# Patient Record
Sex: MALE | Race: WHITE | NOT HISPANIC OR LATINO | Employment: OTHER | URBAN - METROPOLITAN AREA
[De-identification: names, ages, dates, MRNs, and addresses within clinical notes are randomized per-mention and may not be internally consistent; named-entity substitution may affect disease eponyms.]

---

## 2017-07-13 ENCOUNTER — HOSPITAL ENCOUNTER (EMERGENCY)
Facility: HOSPITAL | Age: 62
Discharge: HOME | End: 2017-07-13
Attending: EMERGENCY MEDICINE
Payer: MEDICARE

## 2017-07-13 VITALS
SYSTOLIC BLOOD PRESSURE: 118 MMHG | BODY MASS INDEX: 25.11 KG/M2 | HEIGHT: 67 IN | RESPIRATION RATE: 20 BRPM | TEMPERATURE: 98 F | DIASTOLIC BLOOD PRESSURE: 75 MMHG | WEIGHT: 160 LBS | OXYGEN SATURATION: 96 % | HEART RATE: 72 BPM

## 2017-07-13 DIAGNOSIS — R73.9 HYPERGLYCEMIA: Primary | ICD-10-CM

## 2017-07-13 LAB
ALBUMIN SERPL BCP-MCNC: 3 G/DL
ALP SERPL-CCNC: 83 U/L
ALT SERPL W/O P-5'-P-CCNC: 108 U/L
ANION GAP SERPL CALC-SCNC: 7 MMOL/L
AST SERPL-CCNC: 102 U/L
B-OH-BUTYR BLD STRIP-SCNC: 0.3 MMOL/L
BASOPHILS # BLD AUTO: 0.02 K/UL
BASOPHILS NFR BLD: 0.5 %
BILIRUB SERPL-MCNC: 1 MG/DL
BUN SERPL-MCNC: 11 MG/DL
CALCIUM SERPL-MCNC: 8.4 MG/DL
CHLORIDE SERPL-SCNC: 109 MMOL/L
CO2 SERPL-SCNC: 21 MMOL/L
CREAT SERPL-MCNC: 0.9 MG/DL
DIFFERENTIAL METHOD: ABNORMAL
EOSINOPHIL # BLD AUTO: 0.1 K/UL
EOSINOPHIL NFR BLD: 1.9 %
ERYTHROCYTE [DISTWIDTH] IN BLOOD BY AUTOMATED COUNT: 13.2 %
EST. GFR  (AFRICAN AMERICAN): >60 ML/MIN/1.73 M^2
EST. GFR  (NON AFRICAN AMERICAN): >60 ML/MIN/1.73 M^2
GLUCOSE SERPL-MCNC: 286 MG/DL
HCT VFR BLD AUTO: 39.6 %
HGB BLD-MCNC: 13.9 G/DL
LYMPHOCYTES # BLD AUTO: 1.7 K/UL
LYMPHOCYTES NFR BLD: 39.8 %
MCH RBC QN AUTO: 32.7 PG
MCHC RBC AUTO-ENTMCNC: 35.1 %
MCV RBC AUTO: 93 FL
MONOCYTES # BLD AUTO: 0.3 K/UL
MONOCYTES NFR BLD: 6.3 %
NEUTROPHILS # BLD AUTO: 2.2 K/UL
NEUTROPHILS NFR BLD: 51.3 %
PLATELET # BLD AUTO: 78 K/UL
PMV BLD AUTO: 11.5 FL
POCT GLUCOSE: 296 MG/DL (ref 70–110)
POTASSIUM SERPL-SCNC: 4 MMOL/L
PROT SERPL-MCNC: 6.8 G/DL
RBC # BLD AUTO: 4.25 M/UL
SODIUM SERPL-SCNC: 137 MMOL/L
WBC # BLD AUTO: 4.3 K/UL

## 2017-07-13 PROCEDURE — 82962 GLUCOSE BLOOD TEST: CPT

## 2017-07-13 PROCEDURE — 80053 COMPREHEN METABOLIC PANEL: CPT

## 2017-07-13 PROCEDURE — 99284 EMERGENCY DEPT VISIT MOD MDM: CPT | Mod: ,,, | Performed by: PHYSICIAN ASSISTANT

## 2017-07-13 PROCEDURE — 82010 KETONE BODYS QUAN: CPT

## 2017-07-13 PROCEDURE — 25000003 PHARM REV CODE 250: Performed by: PHYSICIAN ASSISTANT

## 2017-07-13 PROCEDURE — 85025 COMPLETE CBC W/AUTO DIFF WBC: CPT

## 2017-07-13 PROCEDURE — 99283 EMERGENCY DEPT VISIT LOW MDM: CPT

## 2017-07-13 RX ORDER — NAPROXEN 500 MG/1
500 TABLET ORAL
Status: COMPLETED | OUTPATIENT
Start: 2017-07-13 | End: 2017-07-13

## 2017-07-13 RX ADMIN — NAPROXEN 500 MG: 500 TABLET ORAL at 06:07

## 2017-07-13 NOTE — ED NOTES
Patient here c/o elevated blood sugar as diagnosed at  yesterday, but also c/o generalized weakness for past 2 weeks with difficulty ambulating.  Pt in no acute distress, respirations even and unlabored, AA&Ox3

## 2017-07-13 NOTE — ED NOTES
Patient and family acting out and becoming hostile with FRANCISCA Adam.  I called for security, pt and family raised voices and became angry with me, then left before security got here.  Pt ambulated out in no acute distress, respirations even and unlabored, AA&Ox3

## 2017-07-13 NOTE — ED PROVIDER NOTES
"Encounter Date: 7/13/2017       History     Chief Complaint   Patient presents with    Hyperglycemia     Pt diagnosed with diabetes yesterday in ED.  Pt & family left there b/c "they were not treating him right".  States pt fell several times in the hospital.       Patient is a 61-year-old male with no significant past medical history who presents the ED with multiple complaints.  Patient states that yesterday, he had abnormal gait and was running into the walls.  He states that he went to [facility] ED yesterday for his symptoms and had imaging of his neck and head.  Patient is unsure of results, but states that his son knows.  He states that he "signed papers" and left because they told him that he could "check himself out" if he wanted to, so patient left.  When asked again why he presents to this ED, patient states that he keeps running into the walls and that he has high blood sugar. He endorses neck pain that is chronic and unchanged since his accident in the past.  He denies any headache, dizziness, chest pain, abdominal pain, urinary difficulties. He states that he takes xanex for his anxiety.    Daughter called ED, and I talked to Nadia ([phone]). She states that her father has a hx of substance abuse (xanex, suboxone, and "anything he can get his hands on"). He will often get pain pills off the street for his chronic neck pain. Patient denies any drug use other than xanex during my interview. Nadia states that his neighbor called the 911 on him yesterday evening and was sent to 's ED. He was reported to have blood glucose >1000 and given insulin. Patient was to be admitted but left AMA.          Review of patient's allergies indicates:  No Known Allergies  Past Medical History:   Diagnosis Date    Kidney stone     Urinary tract infection      Past Surgical History:   Procedure Laterality Date    KIDNEY STONE SURGERY      NECK SURGERY      secondary to previous fall     Family " History   Problem Relation Age of Onset    Hypertension Mother     Diabetes Mother     Stroke Mother     Prostate cancer Neg Hx     Kidney disease Neg Hx      Social History   Substance Use Topics    Smoking status: Current Every Day Smoker     Packs/day: 1.50     Years: 40.00    Smokeless tobacco: Never Used    Alcohol use Yes     Review of Systems   Constitutional: Negative for chills and fever.   HENT: Negative for ear pain and sore throat.    Eyes: Negative for visual disturbance.   Respiratory: Negative for shortness of breath.    Cardiovascular: Negative for chest pain.   Gastrointestinal: Negative for abdominal pain, nausea and vomiting.   Genitourinary: Negative for dysuria and hematuria.   Musculoskeletal: Positive for arthralgias, gait problem and neck pain. Negative for back pain and neck stiffness.   Skin: Negative for rash.   Neurological: Negative for dizziness, weakness and headaches.   Hematological: Does not bruise/bleed easily.       Physical Exam     Initial Vitals [07/13/17 1329]   BP Pulse Resp Temp SpO2   123/75 79 20 98 °F (36.7 °C) 99 %      MAP       91         Physical Exam    Vitals reviewed.  Constitutional: He appears well-developed and well-nourished. He is not diaphoretic. No distress.   HENT:   Head: Normocephalic and atraumatic.   Nose: Nose normal.   Mouth/Throat: Oropharynx is clear and moist.   Missing teeth   Eyes: Conjunctivae and EOM are normal. Pupils are equal, round, and reactive to light.   Neck: Normal range of motion.   Cardiovascular: Normal rate, regular rhythm and normal heart sounds. Exam reveals no friction rub.    No murmur heard.  Pulmonary/Chest: Breath sounds normal. No respiratory distress. He has no wheezes. He has no rales.   Abdominal: Soft. Bowel sounds are normal. He exhibits no distension. There is no tenderness.   Musculoskeletal: Normal range of motion.   Neurological: He is alert and oriented to person, place, and time. He has normal strength.  No sensory deficit.   Normal finger to nose and rapid alternating hand movements. Good finger . No arm drip.  FROM of 5/5 strength of krys upper and lower extremities.  Negative pronator drift and romberg's. He ambulates without difficulty.   Skin: Skin is warm and dry. No erythema.   Psychiatric: He has a normal mood and affect. Thought content normal.         ED Course   Procedures  Labs Reviewed   CBC W/ AUTO DIFFERENTIAL - Abnormal; Notable for the following:        Result Value    RBC 4.25 (*)     Hemoglobin 13.9 (*)     Hematocrit 39.6 (*)     MCH 32.7 (*)     Platelets 78 (*)     All other components within normal limits   COMPREHENSIVE METABOLIC PANEL - Abnormal; Notable for the following:     CO2 21 (*)     Glucose 286 (*)     Calcium 8.4 (*)     Albumin 3.0 (*)      (*)      (*)     Anion Gap 7 (*)     All other components within normal limits   POCT GLUCOSE - Abnormal; Notable for the following:     POCT Glucose 296 (*)     All other components within normal limits   BETA - HYDROXYBUTYRATE, SERUM             Medical Decision Making:   History:   Old Medical Records: I decided to obtain old medical records.  Clinical Tests:   Lab Tests: Ordered and Reviewed       APC / Resident Notes:   DDX includes but is not limited to hyperglycemia, DKA, substance abuse, electrolyte abnormalities, hypoglycemia.  Will order labs and imaging and reassess.    Fingerstick glucose of 296.  CBC with leukocytosis or anemia.  Thrombocytopenia at 78.  CMP with no electrolyte abnormalities.  Creatinine stable at 0.9. Transaminitis of  and .  Glucose of 286. Anion gap of 7. B-hydroxy of 0.3.    Patient ambulated around the ED with myself and nurse without assistance.  He did not have any difficulty.  No neurological deficits appreciated on my exam.  I have considered but do not suspect any intracerebral hemorrhage or lesion.  Labwork do not suggest DKA today.     Patient's son and daughter-in-law  "arrived to ED to  father. I updated both on his ED course. I explained to them that from what it sounds like patient was possibly treated for DKA at  ED if he was given insulin, but he left AMA before being admitted there. I reassured them that today's lab work does not suggest DKA, and that they most likely treated him properly and that the appropriate next step would be close f/u with PCP in 1 day to manage hyperglycemia. Son and patient started raising their voices at me, and patient was in my face pointing his finger, stating "is it going to be your fault if I die tomorrow". Nurse called security to aid, but patient and son was calmed down with the aid of myself and daughter and law. Again, I reassured patient and family that patient is in stable condition, and patient ambulated out of team 3 without assistance or difficulty before security arrived. I gave thorough instructions to patient and family, especially daughter in law, about importance of follow up and gave strict ED return precaution which they voiced their understanding. I gave them number to our internal medicine clinic for a second opinion as requested. I also suggest that they call their insurance to see who is in network. Diabetic diet resources given with discharge instructions. All questions answered. I have reviewed patient's chart and labs and discuss this case with my supervising MD.              ED Course     Clinical Impression:   The encounter diagnosis was Hyperglycemia.    Disposition:   Disposition: Discharged  Condition: Stable                        Susie Adam PA-C  07/14/17 1915    "

## 2017-07-13 NOTE — DISCHARGE INSTRUCTIONS
Follow up closely with your primary care physician in 1 day for wellness visit and routine blood work to check for diabetes, high cholesterol, high blood pressure, etc.  Return to the emergency department for new or worsening symptoms.    No future appointments.    Our goal in the emergency department is to always give you outstanding care and exceptional service. You may receive a survey by mail or e-mail in the next week regarding your experience in our ED. We would greatly appreciate your completing and returning the survey. Your feedback provides us with a way to recognize our staff who give very good care and it helps us learn how to improve when your experience was below our aspiration of excellence.

## 2017-10-11 ENCOUNTER — OFFICE VISIT (OUTPATIENT)
Dept: NEUROLOGY | Facility: CLINIC | Age: 62
End: 2017-10-11
Payer: MEDICARE

## 2017-10-11 VITALS
HEART RATE: 96 BPM | SYSTOLIC BLOOD PRESSURE: 100 MMHG | BODY MASS INDEX: 22.98 KG/M2 | HEIGHT: 67 IN | WEIGHT: 146.38 LBS | DIASTOLIC BLOOD PRESSURE: 70 MMHG

## 2017-10-11 DIAGNOSIS — M54.2 NECK PAIN: Primary | ICD-10-CM

## 2017-10-11 DIAGNOSIS — F11.20 OPIATE DEPENDENCE, CONTINUOUS: ICD-10-CM

## 2017-10-11 PROCEDURE — 99213 OFFICE O/P EST LOW 20 MIN: CPT | Mod: PBBFAC | Performed by: PSYCHIATRY & NEUROLOGY

## 2017-10-11 PROCEDURE — 99999 PR PBB SHADOW E&M-EST. PATIENT-LVL III: CPT | Mod: PBBFAC,GC,, | Performed by: PSYCHIATRY & NEUROLOGY

## 2017-10-11 PROCEDURE — 99205 OFFICE O/P NEW HI 60 MIN: CPT | Mod: S$PBB,GC,, | Performed by: PSYCHIATRY & NEUROLOGY

## 2017-10-11 RX ORDER — BUPRENORPHINE HYDROCHLORIDE, NALOXONE HYDROCHLORIDE 8; 2 MG/1; MG/1
FILM, SOLUBLE BUCCAL; SUBLINGUAL
COMMUNITY
End: 2018-12-26

## 2017-10-11 RX ORDER — IBUPROFEN 200 MG
200 TABLET ORAL EVERY 6 HOURS PRN
COMMUNITY
End: 2018-12-26

## 2017-10-11 NOTE — PROGRESS NOTES
Patient Name: Josiah Richter  MRN: [id number]    CC: Neck     HPI: Josiah Richter is a 62 y.o. male presented to my clinic for the evaluation of Neck pain. He reported that neck pain started in 2000. At that time 10/10, gradual in onset, throbbing in nature, radiate to left arm,pain get worse with positional change. Denies any trauma of fall at that time. He was seen by pain doctor in [facility] (Dr Ny Duran) and per patient he burned his nerves in the neck and received multiple injections. Last treatment was couple of years ago. This year in January he had a fall at [facility] while purchasing her medication pills. We went to the ED and worked up with no acute injury. He reported a fall in 1998 and had neck surgery and a plate was implanted. Last surgery was in 2002. He was seen by Neurosurgeon (Abebe Castro) at [facility]. Multiple MRI spine was done with significant degenerative changes. He tried Gabapentin, Hydrocodone, Oxycodone, Percocet, Ketoralac, meloxacam, Motrin, Xanax, and many other over the counter medication. I do not see any referral for neurology. Per patient some physician at [facility] sent him here for pain management. He is very poor historian. Most of my questions he is saying he don't remember. Today he is complaining of neck pain 10/10, mostly on the right side, sharp in nature, non radiation. Currently he is on opiates. He said nobody is writing percocet and other opiates for me and he is here to get prescription. He said he can only get these medication from ED.Denies HA, dizziness, difficulty in swallowing, seizure or LOC.        Note from Family practitioner in 2015:   The patient has had previous osteoarthritis of lumbar spine, a previous herniated disc and previous spinal surgery - on his neck following a fall. Symptoms have been present for several years and are unchanged.  Onset was related to / precipitated by a remote injury and a fall. The pain is located in the lumbar and neck and does not  "radiate. The pain is described as sharp and stabbing and occurs all day. He rates his pain as moderate. Symptoms are exacerbated by exercise, straining, twisting and walking for more than a few minutes. Symptoms are improved by narcotic pain medications. He has also tried heat, ice and NSAIDs which provided no symptom relief. He has no other symptoms associated with the back pain. The patient has no "red flag" history indicative of complicated back pain.     ROS:   Constitutional: Negative for malaise/fatigue. Negative for weight loss.   HENT: Negative for hearing loss.   Eyes: Negative for blurred vision and double vision.   Respiratory: Negative for shortness of breath and stridor.   Cardiovascular: Negative for chest pain and palpitations.   Gastrointestinal: Negative for nausea, vomiting and constipation.   Genitourinary: Negative for frequency. Negative for urgency.   Musculoskeletal: Negative for joint pain. Negative for myalgias and falls.   Skin: Negative for rash.   Neurological: Negative for dizziness and tremors. Negative for focal weakness and seizures.   Endo/Heme/Allergies: Does not bruise/bleed easily.   Psychiatric/Behavioral: Negative for depression and hallucinations. The patient is not nervous/anxious.    Past Medical History  Past Medical History:   Diagnosis Date    Kidney stone     Urinary tract infection        Medications    Current Outpatient Prescriptions:     alprazolam (XANAX) 0.5 MG tablet, Take 0.5 mg by mouth 3 (three) times daily., Disp: , Rfl:     buprenorphine-naloxone (SUBOXONE) 8-2 mg Film, Place under the tongue., Disp: , Rfl:     ciprofloxacin (CIPRO) 100 MG tablet, Take 100 mg by mouth 2 (two) times daily., Disp: , Rfl:     empagliflozin-linagliptin (GLYXAMBI) 25-5 mg Tab, Take by mouth., Disp: , Rfl:     fluoxetine (PROZAC) 20 MG capsule, Take 20 mg by mouth once daily., Disp: , Rfl:     ibuprofen (ADVIL,MOTRIN) 200 MG tablet, Take 200 mg by mouth every 6 (six) hours " "as needed for Pain., Disp: , Rfl:     oxycodone (OXYCONTIN) 80 MG Tb12, Take 10 mg by mouth every 12 (twelve) hours., Disp: , Rfl:     oxycodone (ROXICODONE) 30 MG Tab, Take 5 mg by mouth every 6 (six) hours as needed., Disp: , Rfl:     Allergies  Review of patient's allergies indicates:  No Known Allergies    Social History  Social History     Social History    Marital status:      Spouse name: N/A    Number of children: N/A    Years of education: N/A     Occupational History    Not on file.     Social History Main Topics    Smoking status: Current Every Day Smoker     Packs/day: 1.50     Years: 40.00    Smokeless tobacco: Never Used    Alcohol use Yes    Drug use:      Types: Oxycodone    Sexual activity: Not Currently     Other Topics Concern    Not on file     Social History Narrative    No narrative on file       Family History  Family History   Problem Relation Age of Onset    Hypertension Mother     Diabetes Mother     Stroke Mother     Prostate cancer Neg Hx     Kidney disease Neg Hx        Physical Exam  /70   Pulse 96   Ht 5' 7" (1.702 m)   Wt 66.4 kg (146 lb 6.2 oz)   BMI 22.93 kg/m²     General appearance: Well-developed, well-groomed.     Neurologic Exam: The patient is awake, alert and oriented. Language is fluent. Recent and remote memory are normal. Attention span and concentration are normal. Fund of knowledge is appropriate.     Cranial nerves: pupils are round and reactive to light and accommodation. Visual fields are full to confrontation. Fundoscopic exam reveals sharp discs bilaterally, with good venous pulsations. Ocular motility is full in all cardinal positions of gaze. Facial sensation is normal to pinprick and light touch. Corneal reflexes are present bilaterally. Facial activation is symmetric. Hearing is normal bilaterally. Palate elevates symmetrically and gag reflex is intact bilaterally. Shoulder elevation is symmetric and full strength bilaterally. " Tongue is midline and neck rotation strength is normal bilaterally. Neck range of motion is normal.     Motor examination of all extremities demonstrates normal bulk and tone in all four limbs. There are no atrophy or fasciculations. Strength is 5/5 in the upper and lower extremities bilaterally without pronator drift.     Sensory examination is normal to pinprick, vibration and proprioception in the upper and lower extremities bilaterally. Romberg is negative.    Deep tendon reflexes are 3+ and symmetric in the upper and lower extremities bilaterally. Toes are mute bilaterally.     Gait: Normal heel, toe, tandem, and casual gait.    Coordination: Finger to nose and heel to shin testing is normal in both upper and lower extremities. Rapid alternating movements are normal in both upper and lower extremities.     General exam  Cardiovascular: regular rate and rhythm with no murmurs, rubs or gallops. There are no carotid or vertebral artery bruits. Pulses in both upper and lower extremities are symmetric. There is no peripheral edema.   Head and neck: no cervical lymphadenopathy    Lab and Test Results    WBC   Date Value Ref Range Status   07/13/2017 4.30 3.90 - 12.70 K/uL Final   03/13/2015 7.69 3.90 - 12.70 K/uL Final   07/18/2004 13.10 (H) 4.8 - 10.8 K/uL Final     Hemoglobin   Date Value Ref Range Status   07/13/2017 13.9 (L) 14.0 - 18.0 g/dL Final   03/13/2015 15.5 14.0 - 18.0 g/dL Final   07/18/2004 15.1 14.0 - 18.0 gm/dl Final     Hematocrit   Date Value Ref Range Status   07/13/2017 39.6 (L) 40.0 - 54.0 % Final   03/13/2015 45.8 40.0 - 54.0 % Final   07/18/2004 44.7 40.0 - 54.0 % Final     Platelets   Date Value Ref Range Status   07/13/2017 78 (L) 150 - 350 K/uL Final   03/13/2015 136 (L) 150 - 350 K/uL Final   07/18/2004 308 150 - 350 K/uL Final     Glucose   Date Value Ref Range Status   07/13/2017 286 (H) 70 - 110 mg/dL Final   03/13/2015 99 70 - 110 mg/dL Final   07/18/2004 95 70 - 110 mg/dl Final      Sodium   Date Value Ref Range Status   07/13/2017 137 136 - 145 mmol/L Final   03/13/2015 137 136 - 145 mmol/L Final   07/18/2004 136 136 - 145 mMol/l Final     Potassium   Date Value Ref Range Status   07/13/2017 4.0 3.5 - 5.1 mmol/L Final   03/13/2015 4.1 3.5 - 5.1 mmol/L Final   07/18/2004 5.3 3.3 - 5.3 mMol/l Final     Chloride   Date Value Ref Range Status   07/13/2017 109 95 - 110 mmol/L Final   03/13/2015 107 95 - 110 mmol/L Final   07/18/2004 101 95 - 110 mMol/l Final     CO2   Date Value Ref Range Status   07/13/2017 21 (L) 23 - 29 mmol/L Final   03/13/2015 23 23 - 29 mmol/L Final   07/18/2004 19 (L) 23.0 - 29.0 mEq/L Final     BUN, Bld   Date Value Ref Range Status   07/13/2017 11 6 - 20 mg/dL Final   03/13/2015 16 6 - 20 mg/dL Final   07/18/2004 13 5 - 23 mg/dl Final     Creatinine   Date Value Ref Range Status   07/13/2017 0.9 0.5 - 1.4 mg/dL Final   03/13/2015 1.0 0.5 - 1.4 mg/dL Final   07/18/2004 0.9 0.5 - 1.4 mg/dl Final     Calcium   Date Value Ref Range Status   07/13/2017 8.4 (L) 8.7 - 10.5 mg/dL Final   03/13/2015 9.1 8.7 - 10.5 mg/dL Final   07/18/2004 9.5 8.7 - 10.5 mg/dl Final     Magnesium   Date Value Ref Range Status   01/27/2004 2.8 (H) 1.6 - 2.4 mg/dl Final     Alkaline Phosphatase   Date Value Ref Range Status   07/13/2017 83 55 - 135 U/L Final   03/13/2015 73 55 - 135 U/L Final   03/10/2004 82 55 - 135 U/L Final     ALT   Date Value Ref Range Status   07/13/2017 108 (H) 10 - 44 U/L Final   03/13/2015 77 (H) 10 - 44 U/L Final   03/10/2004 29 0 - 40 U/L Final     AST   Date Value Ref Range Status   07/13/2017 102 (H) 10 - 40 U/L Final   03/13/2015 72 (H) 10 - 40 U/L Final   03/10/2004 28 0 - 37 U/L Final       Images:   No recent images     Assessment and Plan    Josiah Richter is a 62 y.o. male presented to my clinic for the evaluation of neck pain. He is here for prescription refill (opiates).     -- Refused Gabapentin and other medication  -- Asking for opioids and pain patches    -- Me and Dr Alexander have explained in detail that we don't write opiates for neuropathic pain.   -- Referred to Pain medication.    -- Case discussed with Dr Alexander  -- Follow up MAURICIO Pritchard MD  Neurology Resident   [facility]  [address] [place] [address]  Pager: [phone]      I have personally taken a history and examined the patient and agree with the resident's note as stated above.    Ernesto Alexander MD  [facility]

## 2017-11-27 RX ORDER — INSULIN GLARGINE 100 [IU]/ML
INJECTION, SOLUTION SUBCUTANEOUS
Qty: 15 ML | Refills: 2 | OUTPATIENT
Start: 2017-11-27

## 2018-03-26 ENCOUNTER — HOSPITAL ENCOUNTER (EMERGENCY)
Facility: OTHER | Age: 63
Discharge: HOME | End: 2018-03-26
Attending: EMERGENCY MEDICINE
Payer: MEDICARE

## 2018-03-26 VITALS
BODY MASS INDEX: 23.14 KG/M2 | TEMPERATURE: 99 F | SYSTOLIC BLOOD PRESSURE: 115 MMHG | HEART RATE: 81 BPM | HEIGHT: 66 IN | WEIGHT: 144 LBS | DIASTOLIC BLOOD PRESSURE: 73 MMHG | OXYGEN SATURATION: 96 % | RESPIRATION RATE: 18 BRPM

## 2018-03-26 DIAGNOSIS — Z76.0 MEDICATION REFILL: Primary | ICD-10-CM

## 2018-03-26 PROCEDURE — 99282 EMERGENCY DEPT VISIT SF MDM: CPT

## 2018-03-26 RX ORDER — METFORMIN HYDROCHLORIDE 500 MG/1
500 TABLET ORAL 2 TIMES DAILY WITH MEALS
Qty: 28 TABLET | Refills: 0 | Status: ON HOLD | OUTPATIENT
Start: 2018-03-26 | End: 2019-02-28

## 2018-03-26 NOTE — ED PROVIDER NOTES
Encounter Date: 3/26/2018       History     Chief Complaint   Patient presents with    Medication Refill     Pt states he took his last pill of metformin this am and needs a refill     Patient is a 62-year-old male with a past medical history of diabetes, presenting to the emergency department with complaints of a medication refill.  The patient states that he took his last pill of metformin earlier today.  He states that he does not have any evidence concerning his diabetes is going to become out of control.  He states he does not see her PCP for this nor does he follow regularly.  He states he typically receives his refills from urgent care the emergency department.  In addition, the patient reports he is most concerned because he thinks that he accidentally threw away his bottle of Xanax.  He states that he typically receives approximate 45 pills per month.  He states he is not due to see his rash list until next month and is concerned he threw away his bottle.  He states he needs a new refill now.      The history is provided by the patient.     Review of patient's allergies indicates:  No Known Allergies  Past Medical History:   Diagnosis Date    Anxiety     Diabetes mellitus 2017    Kidney stone     Urinary tract infection      Past Surgical History:   Procedure Laterality Date    KIDNEY STONE SURGERY      NECK SURGERY      secondary to previous fall     Family History   Problem Relation Age of Onset    Hypertension Mother     Diabetes Mother     Stroke Mother     Prostate cancer Neg Hx     Kidney disease Neg Hx      Social History   Substance Use Topics    Smoking status: Current Every Day Smoker     Packs/day: 1.50     Years: 40.00    Smokeless tobacco: Never Used    Alcohol use No     Review of Systems   Constitutional: Negative for activity change, chills, fatigue and fever.   HENT: Negative for congestion, rhinorrhea and sore throat.    Eyes: Negative for photophobia and visual disturbance.    Respiratory: Negative for cough and shortness of breath.    Cardiovascular: Negative for chest pain.   Gastrointestinal: Negative for abdominal pain, diarrhea, nausea and vomiting.   Genitourinary: Negative for dysuria, hematuria and urgency.   Musculoskeletal: Negative for back pain, myalgias and neck pain.   Skin: Negative for color change and wound.   Neurological: Negative for weakness and headaches.   Psychiatric/Behavioral: Negative for agitation and confusion.       Physical Exam     Initial Vitals [03/26/18 1301]   BP Pulse Resp Temp SpO2   126/69 90 18 98.5 °F (36.9 °C) 99 %      MAP       88         Physical Exam    Nursing note and vitals reviewed.  Constitutional: Vital signs are normal. He appears well-developed and well-nourished. He is not diaphoretic.  Non-toxic appearance. He does not have a sickly appearance. He does not appear ill. No distress.   Thin,  male who appears older than stated age.  He is in no acute distress.  He speaking clear and full sentences.   HENT:   Head: Normocephalic and atraumatic.   Right Ear: External ear normal.   Left Ear: External ear normal.   Nose: Nose normal.   Mouth/Throat: Oropharynx is clear and moist.   Eyes: Conjunctivae and EOM are normal.   Neck: Normal range of motion. Neck supple.   Cardiovascular: Normal rate, regular rhythm and normal heart sounds.   Pulmonary/Chest: Breath sounds normal. No respiratory distress. He has no wheezes.   Musculoskeletal: Normal range of motion.   Neurological: He is alert and oriented to person, place, and time. GCS eye subscore is 4. GCS verbal subscore is 5. GCS motor subscore is 6.   Skin: Skin is warm.   Psychiatric: He has a normal mood and affect. His behavior is normal. Judgment and thought content normal.         ED Course   Procedures  Labs Reviewed - No data to display          Medical Decision Making:   Initial Assessment:   Urgent evaluation of a 62-year-old male presenting to the emergency department  for medication refill.  Patient is afebrile, nontoxic appearing, hemodynamically stable.  Physical exam reveals no significant findings.  Patient is requesting refills for metformin in addition to Xanax.  ED Management:  I explained to the patient that I would be happy to provide him with a 2 week prescription of metformin in addition to resources to obtain a PCP for further evaluation and management as well as control of his diabetes.  I did also place the patient that I would be unwilling to provide a prescription for Xanax as a states a controlled substance.  The patient became upset by this, but I explained to him that he would need to follow-up with his specialist who gives him all of these pills.  Given a prescription for metformin, discharged in stable condition. The patient was instructed to follow up with a primary care provider in 2 days or to return to the emergency department for worsening symptoms. The treatment plan was discussed with the patient who demonstrated understanding and comfort with plan. The patient's history, physical exam, and plan of care was discussed with and agreed upon with my supervising physician.    Other:   I have discussed this case with another health care provider.       <> Summary of the Discussion: Dr. Hameed  This note was created using Dragon Medical Dictation. There may be typographical errors secondary to dictation.                       Clinical Impression:     1. Medication refill         Disposition:   Disposition: Discharged  Condition: Stable                        Nadine Vasquez PA-C  03/26/18 8634

## 2018-03-26 NOTE — ED NOTES
"Pt states he was cleaning out his medication drawer and accidentally threw away his anxiety medication and took his last DM medication "Metformin" today. Pt requests medication refill. Pt states Dr. Nahid Gardner is PCP with next appointment "on the 21st".   "

## 2018-04-15 ENCOUNTER — HOSPITAL ENCOUNTER (EMERGENCY)
Facility: OTHER | Age: 63
Discharge: LEFT BEFORE DISCHARGE | End: 2018-04-15
Attending: EMERGENCY MEDICINE
Payer: MEDICARE

## 2018-04-15 VITALS
HEART RATE: 78 BPM | WEIGHT: 144 LBS | TEMPERATURE: 98 F | OXYGEN SATURATION: 97 % | RESPIRATION RATE: 15 BRPM | BODY MASS INDEX: 22.6 KG/M2 | HEIGHT: 67 IN | SYSTOLIC BLOOD PRESSURE: 110 MMHG | DIASTOLIC BLOOD PRESSURE: 68 MMHG

## 2018-04-15 DIAGNOSIS — R42 DIZZINESS: ICD-10-CM

## 2018-04-15 LAB
ALBUMIN SERPL BCP-MCNC: 3.3 G/DL
ALP SERPL-CCNC: 67 U/L
ALT SERPL W/O P-5'-P-CCNC: 46 U/L
ANION GAP SERPL CALC-SCNC: 10 MMOL/L
AST SERPL-CCNC: 45 U/L
BASOPHILS # BLD AUTO: 0.02 K/UL
BASOPHILS NFR BLD: 0.5 %
BILIRUB SERPL-MCNC: 0.4 MG/DL
BUN SERPL-MCNC: 14 MG/DL
CALCIUM SERPL-MCNC: 9.4 MG/DL
CHLORIDE SERPL-SCNC: 110 MMOL/L
CO2 SERPL-SCNC: 22 MMOL/L
CREAT SERPL-MCNC: 0.8 MG/DL
DIFFERENTIAL METHOD: ABNORMAL
EOSINOPHIL # BLD AUTO: 0.1 K/UL
EOSINOPHIL NFR BLD: 2.1 %
ERYTHROCYTE [DISTWIDTH] IN BLOOD BY AUTOMATED COUNT: 14.4 %
EST. GFR  (AFRICAN AMERICAN): >60 ML/MIN/1.73 M^2
EST. GFR  (NON AFRICAN AMERICAN): >60 ML/MIN/1.73 M^2
GLUCOSE SERPL-MCNC: 119 MG/DL
HCT VFR BLD AUTO: 38.3 %
HGB BLD-MCNC: 12.4 G/DL
LYMPHOCYTES # BLD AUTO: 1.7 K/UL
LYMPHOCYTES NFR BLD: 41.5 %
MCH RBC QN AUTO: 32.1 PG
MCHC RBC AUTO-ENTMCNC: 32.4 G/DL
MCV RBC AUTO: 99 FL
MONOCYTES # BLD AUTO: 0.3 K/UL
MONOCYTES NFR BLD: 7.9 %
NEUTROPHILS # BLD AUTO: 2 K/UL
NEUTROPHILS NFR BLD: 47.8 %
PLATELET # BLD AUTO: 127 K/UL
PMV BLD AUTO: 10.5 FL
POCT GLUCOSE: 135 MG/DL (ref 70–110)
POTASSIUM SERPL-SCNC: 4.4 MMOL/L
PROT SERPL-MCNC: 7.5 G/DL
RBC # BLD AUTO: 3.86 M/UL
SODIUM SERPL-SCNC: 142 MMOL/L
WBC # BLD AUTO: 4.19 K/UL

## 2018-04-15 PROCEDURE — 99284 EMERGENCY DEPT VISIT MOD MDM: CPT | Mod: 25

## 2018-04-15 PROCEDURE — 85025 COMPLETE CBC W/AUTO DIFF WBC: CPT

## 2018-04-15 PROCEDURE — 80053 COMPREHEN METABOLIC PANEL: CPT

## 2018-04-15 PROCEDURE — 93005 ELECTROCARDIOGRAM TRACING: CPT

## 2018-04-15 PROCEDURE — 93010 ELECTROCARDIOGRAM REPORT: CPT | Mod: ,,, | Performed by: INTERNAL MEDICINE

## 2018-04-15 PROCEDURE — 82962 GLUCOSE BLOOD TEST: CPT

## 2018-04-15 NOTE — ED NOTES
"Attempted to complete hourly rounding. Pt not in room. Pt believed to have eloped. Pt witnessed leaving by ED staff, stating "I am ready to go".   "

## 2018-04-15 NOTE — ED PROVIDER NOTES
Encounter Date: 4/15/2018       History     Chief Complaint   Patient presents with    Gait Problem     Pt reports recent medicatin change. Pt reports that he was taken off  insulin & kept on metformin BID. Pt reports unstable gait, staggering & needing to hold on to the wall to assist ambulation or he feels he may fall. Pt reports that his glucometer broke & has not checked his blood sugar in 2 months. Pt reports severe weight loss in the pass 2 months.     Patient is a 62-year-old male with history of anxiety and diabetes who presents to the emergency department with unsteady gait.  Patient states he was diagnosed with diabetes 6 months ago.  He states he was initially started on metformin and insulin.  He states after 2 weeks of taking the insulin, his PCP instructed him to discontinue the insulin and only take the metformin.  He states he has been compliant with the metformin over the last 6 months.  He states over the last 8 weeks he has not been feeling himself.  He reports large amount of weight loss.  He reports decreased appetite.  He was attributing all of his symptoms to the metformin.  He states that 4 weeks ago he began to have an unsteady gait.  He states he does not feel dizzy, but he feels as if he is going to fall while walking.  He states he has to hold onto something in order to have steady gait.  He denies any changes in speech.  He denies facial asymmetry.  He denies extremity weakness.  He denies tinnitus.  He denies hearing loss.  He denies nausea or vomiting.  He reports normal bowel movements.  He denies recent illness, fevers, or chills.      The history is provided by the patient.     Review of patient's allergies indicates:  No Known Allergies  Past Medical History:   Diagnosis Date    Anxiety     Diabetes mellitus 2017    Kidney stone     Urinary tract infection      Past Surgical History:   Procedure Laterality Date    KIDNEY STONE SURGERY      NECK SURGERY      secondary to  previous fall     Family History   Problem Relation Age of Onset    Hypertension Mother     Diabetes Mother     Stroke Mother     Prostate cancer Neg Hx     Kidney disease Neg Hx      Social History   Substance Use Topics    Smoking status: Current Every Day Smoker     Packs/day: 1.50     Years: 40.00    Smokeless tobacco: Never Used    Alcohol use No     Review of Systems   Constitutional: Positive for appetite change. Negative for activity change, chills, fatigue and fever.   HENT: Negative for congestion, ear discharge, ear pain, postnasal drip, rhinorrhea, sore throat and trouble swallowing.    Eyes: Negative for photophobia and visual disturbance.   Respiratory: Negative for cough and shortness of breath.    Cardiovascular: Negative for chest pain.   Gastrointestinal: Negative for abdominal pain, blood in stool, constipation, diarrhea, nausea and vomiting.   Genitourinary: Negative for dysuria, flank pain and hematuria.   Musculoskeletal: Negative for back pain, neck pain and neck stiffness.   Skin: Negative for rash and wound.   Neurological: Negative for syncope, speech difficulty, weakness, light-headedness and numbness.        Unsteady gait       Physical Exam     Initial Vitals [04/15/18 1212]   BP Pulse Resp Temp SpO2   134/69 71 18 97.5 °F (36.4 °C) 96 %      MAP       90.67         Physical Exam    Nursing note and vitals reviewed.  Constitutional: He appears well-developed and well-nourished. He is not diaphoretic.  Non-toxic appearance. No distress.   HENT:   Head: Normocephalic.   Right Ear: Hearing and external ear normal.   Left Ear: Hearing and external ear normal.   Nose: Nose normal.   Mouth/Throat: Uvula is midline, oropharynx is clear and moist and mucous membranes are normal. No oropharyngeal exudate.   Eyes: Conjunctivae and EOM are normal. Pupils are equal, round, and reactive to light.   Neck: Normal range of motion and full passive range of motion without pain. Neck supple. No  spinous process tenderness and no muscular tenderness present. Normal range of motion present. No neck rigidity.   Cardiovascular: Normal rate and regular rhythm.   No lower extremity edema   Pulmonary/Chest: Breath sounds normal.   Abdominal: Soft. Bowel sounds are normal. There is no tenderness. There is no rebound, no CVA tenderness, no tenderness at McBurney's point and negative Pierre's sign.   Lymphadenopathy:     He has no cervical adenopathy.   Neurological: He is alert and oriented to person, place, and time. He has normal strength. No cranial nerve deficit or sensory deficit. Coordination and gait normal.   Steady gait   Skin: Skin is warm and dry. Capillary refill takes less than 2 seconds.   Psychiatric: He has a normal mood and affect.         ED Course   Procedures  Labs Reviewed   CBC W/ AUTO DIFFERENTIAL - Abnormal; Notable for the following:        Result Value    RBC 3.86 (*)     Hemoglobin 12.4 (*)     Hematocrit 38.3 (*)     MCV 99 (*)     MCH 32.1 (*)     Platelets 127 (*)     All other components within normal limits   COMPREHENSIVE METABOLIC PANEL - Abnormal; Notable for the following:     CO2 22 (*)     Glucose 119 (*)     Albumin 3.3 (*)     AST 45 (*)     ALT 46 (*)     All other components within normal limits   POCT GLUCOSE - Abnormal; Notable for the following:     POCT Glucose 135 (*)     All other components within normal limits   POCT GLUCOSE MONITORING CONTINUOUS     EKG Readings: (Independently Interpreted)   Initial Reading: No STEMI. Rhythm: Normal Sinus Rhythm. Heart Rate: 86.       X-Rays:   Independently Interpreted Readings:   Head CT: No hemorrhage.  No acute stroke. No mass effect or large masses.     Medical Decision Making:   Initial Assessment:   Urgent evaluation of a 62-year-old male with history of diabetes who presents to the emergency department with unsteady gait.  Patient is afebrile, nontoxic appearing, and hemodynamically stable.  Moist mucous membranes.  Benign  abdominal exam.  Normal neuro exam.  Steady gait appreciated on exam.  No abnormal coordination.  Patient has no other symptoms to suggest vertigo or in her ear etiology.  Head CT will be obtained.  EKG will be obtained.  Basic labs will be obtained.  Clinical Tests:   Lab Tests: Ordered and Reviewed  Radiological Study: Ordered and Reviewed  ED Management:  Before results were able to be discussed with patient and reevaluation peformed, patient eloped.  Other:   I have discussed this case with another health care provider.       <> Summary of the Discussion: Dr. Alvarado                      Clinical Impression:   The encounter diagnosis was Dizziness.                           Summer Huff PA-C  04/15/18 2039

## 2018-04-15 NOTE — ED TRIAGE NOTES
Pt presents to the ED c/o problems with gait x 3 months. Pt states that he feels off balance while walking. Pt states that he has to hold on to the wall when ambulating.Pt states that 2 months ago he weighed 197lbs and since he started Metformin he's down to 141 LBS.  Pt denies dizziness, syncope, fever and sob.

## 2018-04-15 NOTE — ED NOTES
Pt found attempting to leave ED. Pt instructed to wait in room until provider can discuss results with him. IV removed in case pt elopes. Will continue to monitor.

## 2018-12-26 ENCOUNTER — OFFICE VISIT (OUTPATIENT)
Dept: URGENT CARE | Facility: CLINIC | Age: 63
End: 2018-12-26
Payer: MEDICARE

## 2018-12-26 VITALS
DIASTOLIC BLOOD PRESSURE: 72 MMHG | HEART RATE: 83 BPM | BODY MASS INDEX: 22.6 KG/M2 | SYSTOLIC BLOOD PRESSURE: 117 MMHG | TEMPERATURE: 98 F | RESPIRATION RATE: 16 BRPM | OXYGEN SATURATION: 98 % | WEIGHT: 144 LBS | HEIGHT: 67 IN

## 2018-12-26 DIAGNOSIS — K08.9 CHRONIC DENTAL PAIN: ICD-10-CM

## 2018-12-26 DIAGNOSIS — G89.29 CHRONIC DENTAL PAIN: ICD-10-CM

## 2018-12-26 DIAGNOSIS — R05.9 COUGH: Primary | ICD-10-CM

## 2018-12-26 PROCEDURE — 99214 OFFICE O/P EST MOD 30 MIN: CPT | Mod: S$GLB,,, | Performed by: EMERGENCY MEDICINE

## 2018-12-26 RX ORDER — ONDANSETRON 4 MG/1
TABLET, ORALLY DISINTEGRATING ORAL
Status: ON HOLD | COMMUNITY
Start: 2018-11-28 | End: 2019-02-28

## 2018-12-26 RX ORDER — ALPRAZOLAM 2 MG/1
TABLET ORAL
Refills: 0 | COMMUNITY
Start: 2018-12-14 | End: 2019-02-02

## 2018-12-26 RX ORDER — ALPRAZOLAM 2 MG/1
1 TABLET ORAL 3 TIMES DAILY PRN
COMMUNITY
Start: 2018-05-05 | End: 2019-02-02

## 2018-12-26 RX ORDER — IBUPROFEN 800 MG/1
800 TABLET ORAL EVERY 8 HOURS PRN
Qty: 60 TABLET | Refills: 2 | Status: SHIPPED | OUTPATIENT
Start: 2018-12-26 | End: 2019-01-31

## 2018-12-26 RX ORDER — CEPHALEXIN 500 MG/1
CAPSULE ORAL
COMMUNITY
Start: 2018-10-31 | End: 2019-02-07

## 2018-12-26 RX ORDER — AMOXICILLIN AND CLAVULANATE POTASSIUM 875; 125 MG/1; MG/1
TABLET, FILM COATED ORAL
COMMUNITY
Start: 2018-09-27 | End: 2019-02-07

## 2018-12-26 RX ORDER — OXYCODONE AND ACETAMINOPHEN 10; 325 MG/1; MG/1
TABLET ORAL
Status: ON HOLD | COMMUNITY
Start: 2018-11-28 | End: 2019-02-28

## 2018-12-26 RX ORDER — PROMETHAZINE HYDROCHLORIDE AND CODEINE PHOSPHATE 6.25; 1 MG/5ML; MG/5ML
5 SOLUTION ORAL EVERY 4 HOURS PRN
Qty: 120 ML | Refills: 0 | Status: SHIPPED | OUTPATIENT
Start: 2018-12-26 | End: 2019-01-05

## 2018-12-26 RX ORDER — ALPRAZOLAM 1 MG/1
TABLET ORAL
COMMUNITY
Start: 2018-11-28 | End: 2019-02-02

## 2018-12-26 NOTE — PATIENT INSTRUCTIONS
"Please return here or go to the Emergency Department for any concerns or worsening of condition.  If you were prescribed antibiotics, please take them to completion.  If you were prescribed a narcotic medication, do not drive or operate heavy equipment or machinery while taking these medications.  Please follow up with your primary care doctor or specialist as needed.  If you  smoke, please stop smoking.      Dental Pain    A crack or cavity in a tooth can cause tooth pain. This is because the crack or cavity exposes the sensitive inner area of the tooth. An infection in the gum or the root of the tooth can cause pain and swelling. The pain is often made worse when you drink hot or cold beverages. It can also be worse when you bite on hard foods. Pain may spread from the tooth to your ear or the area of the jaw on the same side.  Home care  Follow these tips when caring for yourself at home:  · Avoid hot and cold foods and drinks. Your tooth may be sensitive to changes in temperature.  · Use toothpaste made for sensitive teeth. Brush gently up and down instead of sideways. Brushing sideways can wear away root surfaces if they are exposed.  · If your tooth is chipped or cracked, or if there is a large open cavity, put oil of cloves directly on the tooth to relieve pain. You can buy oil of cloves at drugstores. Some pharmacies carry an over-the-counter "toothache kit." This contains a paste that you can put on the exposed tooth to make it less sensitive.  · Put a cold pack on your jaw over the sore area to help reduce pain.  · You may use over-the-counter medicine to ease pain, unless your doctor prescribed another medicine. If you have chronic liver or kidney disease, talk with your healthcare provider before using acetaminophen or ibuprofen. Also talk with your provider if youve had a stomach ulcer or GI bleeding.  · If you have signs of an infection, you will be given an antibiotic. Take it as directed.  Follow-up " care  Follow up with your dentist, or as advised. Your pain may go away with the treatment given today. But only a dentist can fully look at and treat the cause of your pain. This will keep the pain from coming back.  Call 911  Call 911 if any of these occur:  · Unusual drowsiness  · Headache or stiff neck  · Weakness or fainting  · Difficulty swallowing or breathing  When to seek medical advice  Call your health care provider right away if any of these occur:  · Your face becomes swollen or red  · Pain gets worse or spreads to your neck  · Fever of 100.4º F (38.0º C) or higher, or as directed by your healthcare provider  · Pus drains from the tooth  Date Last Reviewed: 10/1/2016  © 8314-9487 [employer]. [address] [place] [address], [place] [address]. All rights reserved. This information is not intended as a substitute for professional medical care. Always follow your healthcare professional's instructions.        Cough, Chronic, Uncertain Cause (Adult)    Everyone has had a cough as part of the common cold, flu, or bronchitis. This kind of cough occurs along with an achy feeling, low-grade fever, nasal and sinus congestion, and a scratchy or sore throat. This usually gets better in 2 to 3 weeks. A cough that lasts longer than 3 weeks may be due to other causes.  If your cough does not improve over the next 2 weeks, further testing may be needed. Follow up with your healthcare provider as advised. Cough suppressants may be recommended. Based on your exam today, the exact cause of your cough is not certain. Below are some common causes for persistent cough.  Smokers cough  Smokers cough doesnt go away. If you continue to smoke, it only gets worse. The cough is from irritation in the air passages. Talk to your healthcare provider about quitting. Medicines or nicotine-replacement products, like gum or the patch, may make quitting easier.  Postnasal drip  A cough that is worse at night may be due to  postnasal drip. Excess mucus in the nose drains from the back of your nose to your throat. This triggers the cough reflex. Postnasal drip may be due to a sinus infection or allergy. Common allergens include dust, tobacco smoke (both inhaled and secondhand smoke), environmental pollutants, pollen, mold, pets, cleaning agents, room deodorizers, and chemical fumes. Over-the-counter antihistamines or decongestants may be helpful for allergies. A sinus infection may requires antibiotic treatment. See your healthcare provider if symptoms continue.  Medicines  Certain prescribed medicines can cause a chronic cough in some people:  · ACE inhibitors for high blood pressure. These include benazepril, captopril, enalapril, fosinopril, lisinopril, quinapril, ramipril, and others.  · Beta-blockers for high blood pressure and other conditions. These include propranolol, atenolol, metoprolol, nadolol, and others.  Let your healthcare provider know if you are taking any of these.  Asthma  Cough may be the only sign of mild asthma. You may have tests to find out if asthma is causing your cough. You may also take asthma medicine on a trial basis.  Acid reflux (heartburn, GERD)  The esophagus is the tube that carries food from the mouth to the stomach. A valve at its lower end prevents stomach acids from flowing upward. If this valve does not work properly, acid from the stomach enters the esophagus. This may cause a burning pain in the upper abdomen or lower chest, belching, or cough. Symptoms are often worse when lying flat. Avoid eating or drinking before bedtime. Try using extra pillows to raise your upper body, or place 4-inch blocks under the head of your bed. You may try an over-the-counter antacid or an acid-blocking medicine such as famotidine, cimetidine, ranitidine, esomeprazole, lansoprazole, or omeprazole. Stronger medicines for this condition can be prescribed by your healthcare provider.  Follow-up care  Follow up with  your healthcare provider, or as advised, if your cough does not improve. Further testing may be needed.  Note: If an X-ray was taken, a specialist will review it. You will be notified of any new findings that may affect your care.  When to seek medical advice  Call your healthcare provider right away if any of these occur:  · Mild wheezing or difficulty breathing  · Fever of 100.4ºF (38ºC) or higher, or as directed by your healthcare provider  · Unexpected weight loss  · Coughing up large amounts of colored sputum  · Night sweats (sheets and pajamas get soaking wet)  Call 911, or get immediate medical care  Contact emergency services right away if any of these occur:  · Coughing up blood  · Moderate to severe trouble breathing or wheezing  Date Last Reviewed: 9/13/2015  © 3257-6552 [employer]. [address] [place] [address], [place] [address]. All rights reserved. This information is not intended as a substitute for professional medical care. Always follow your healthcare professional's instructions.

## 2018-12-26 NOTE — PROGRESS NOTES
"Subjective:       Patient ID: Josiah Richter is a 63 y.o. male.    Vitals:  height is 5' 7" (1.702 m) and weight is 65.3 kg (144 lb). His oral temperature is 98.1 °F (36.7 °C). His blood pressure is 117/72 and his pulse is 83. His respiration is 16 and oxygen saturation is 98%.     Chief Complaint: Medication Refill    [place], has a cough and is here to get refills on meds he lost. Cough onset was four days ago.       Medication Refill   This is a new problem. The current episode started today. The problem occurs constantly. The problem has been unchanged. Associated symptoms include coughing. Pertinent negatives include no arthralgias, chest pain, chills, congestion, fatigue, fever, headaches, joint swelling, myalgias, nausea, rash, sore throat, vertigo or vomiting.   Cough   This is a new problem. The current episode started in the past 7 days. The problem has been unchanged. The problem occurs every few minutes. The cough is productive of sputum. Pertinent negatives include no chest pain, chills, fever, headaches, myalgias, rash, sore throat or shortness of breath. He has tried nothing for the symptoms.       Constitution: Negative for chills, fatigue and fever.   HENT: Negative for congestion and sore throat.    Neck: Negative for painful lymph nodes.   Cardiovascular: Negative for chest pain and leg swelling.   Eyes: Negative for double vision and blurred vision.   Respiratory: Positive for cough and sputum production. Negative for shortness of breath.    Gastrointestinal: Negative for nausea, vomiting and diarrhea.   Genitourinary: Negative for dysuria, frequency and urgency.   Musculoskeletal: Negative for joint pain, joint swelling, muscle cramps and muscle ache.   Skin: Negative for color change, pale and rash.   Allergic/Immunologic: Negative for seasonal allergies.   Neurological: Negative for dizziness, history of vertigo, light-headedness, passing out and headaches. "   Hematologic/Lymphatic: Negative for swollen lymph nodes, easy bruising/bleeding and history of blood clots. Does not bruise/bleed easily.   Psychiatric/Behavioral: Negative for nervous/anxious, sleep disturbance and depression. The patient is not nervous/anxious.        Objective:      Physical Exam    Assessment:       No diagnosis found.    Plan:         There are no diagnoses linked to this encounter.
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
## 2019-01-12 ENCOUNTER — HOSPITAL ENCOUNTER (EMERGENCY)
Facility: OTHER | Age: 64
Discharge: LEFT BEFORE DISCHARGE | End: 2019-01-12
Attending: EMERGENCY MEDICINE
Payer: MEDICARE

## 2019-01-12 VITALS
TEMPERATURE: 98 F | HEART RATE: 89 BPM | WEIGHT: 170 LBS | OXYGEN SATURATION: 100 % | BODY MASS INDEX: 27.32 KG/M2 | RESPIRATION RATE: 16 BRPM | HEIGHT: 66 IN | SYSTOLIC BLOOD PRESSURE: 122 MMHG | DIASTOLIC BLOOD PRESSURE: 72 MMHG

## 2019-01-12 DIAGNOSIS — R53.1 WEAKNESS: Primary | ICD-10-CM

## 2019-01-12 LAB — POCT GLUCOSE: 100 MG/DL (ref 70–110)

## 2019-01-12 PROCEDURE — 99284 EMERGENCY DEPT VISIT MOD MDM: CPT | Mod: 25

## 2019-01-12 PROCEDURE — 82962 GLUCOSE BLOOD TEST: CPT

## 2019-01-12 NOTE — ED PROVIDER NOTES
"Encounter Date: 1/12/2019    SCRIBE #1 NOTE: I, Erin Garza, am scribing for, and in the presence of, Dr. Oglesby.       History     Chief Complaint   Patient presents with    Hypoglycemia     Pt reports "something's wrong with my sugar, I guess. I been following all the rules but I guess it don't matter." Pt denies alcohol use. Appears intoxicated. "People think I'm loaded but I'm not." . Nausea, weakness, slurred speech x 1.5 weeks     Time seen by provider: 3:23 PM    This is a 63 y.o. male, with history of DM, who presents to the ED with complaint of bilateral leg weakness with right greater than left for the past week and half. Patient also complains of abnormal sugar levels. Patient reports feeling dizzy with slight headaches and having a pre-syncopal episode. Patient taking metformin with unexpected weight change. Patient reports use of tobacco with about a pack a day. Patient is homeless.      The history is provided by the patient.     Review of patient's allergies indicates:  No Known Allergies  Past Medical History:   Diagnosis Date    Anxiety     Diabetes mellitus 2017    Kidney stone     Urinary tract infection      Past Surgical History:   Procedure Laterality Date    KIDNEY STONE SURGERY      NECK SURGERY      secondary to previous fall     Family History   Problem Relation Age of Onset    Hypertension Mother     Diabetes Mother     Stroke Mother     Prostate cancer Neg Hx     Kidney disease Neg Hx      Social History     Tobacco Use    Smoking status: Current Every Day Smoker     Packs/day: 1.50     Years: 40.00     Pack years: 60.00    Smokeless tobacco: Never Used   Substance Use Topics    Alcohol use: No    Drug use: Yes     Types: Oxycodone     Review of Systems   Constitutional: Positive for unexpected weight change. Negative for fever.   HENT: Negative for sore throat.    Respiratory: Negative for shortness of breath.    Cardiovascular: Negative for chest pain. "   Gastrointestinal: Negative for nausea.   Genitourinary: Negative for dysuria.   Musculoskeletal: Negative for back pain.   Skin: Negative for rash.   Neurological: Positive for dizziness and weakness.   Hematological: Does not bruise/bleed easily.       Physical Exam     Initial Vitals [01/12/19 1512]   BP Pulse Resp Temp SpO2   110/69 92 16 98.2 °F (36.8 °C) 98 %      MAP       --         Physical Exam    Nursing note and vitals reviewed.  Constitutional: He appears well-developed and well-nourished. He is not diaphoretic. No distress.   HENT:   Head: Normocephalic and atraumatic.   Eyes: Conjunctivae and EOM are normal.   Neck: Normal range of motion. Neck supple.   Cardiovascular: Normal rate, regular rhythm and normal heart sounds. Exam reveals no gallop and no friction rub.    No murmur heard.  Pulmonary/Chest: Breath sounds normal. No respiratory distress. He has no wheezes. He has no rhonchi. He has no rales.   Musculoskeletal: Normal range of motion. He exhibits no edema or tenderness.   Neurological: He is alert and oriented to person, place, and time. He has normal strength. No cranial nerve deficit or sensory deficit. GCS score is 15. GCS eye subscore is 4. GCS verbal subscore is 5. GCS motor subscore is 6.   Slightly slurred speech, but comprehensible. No ataxia. No focal neurological deficits.   Skin: Skin is warm and dry. No rash noted. No erythema. No pallor.   Psychiatric: He has a normal mood and affect. His behavior is normal. Judgment and thought content normal.         ED Course   Procedures  Labs Reviewed   POCT GLUCOSE          Imaging Results          CT Head Without Contrast (Final result)  Result time 01/12/19 15:52:42    Final result by Sam Mccarthy MD (01/12/19 15:52:42)                 Impression:      No acute intracranial process.    Left-sided maxillary sinus disease.      Electronically signed by: Sam Mccarthy MD  Date:    01/12/2019  Time:    15:52             Narrative:     EXAMINATION:  CT HEAD WITHOUT CONTRAST    CLINICAL HISTORY:  Altered mental status.    TECHNIQUE:  Low dose axial images were obtained through the head.  Coronal and sagittal reformations were also performed. Contrast was not administered.    COMPARISON:  04/15/2018.    FINDINGS:  The subcutaneous tissues are unremarkable.  There is chronic deformity involving the left frontal bone.  The remaining bony calvarium is intact.  There is a circumferential mucosal thickening within the left maxillary sinus.  The remainder of the paranasal sinuses and mastoid air cells are clear.  The orbits and intraorbital contents are within normal limits.    The craniocervical junction is within normal limits.  The midline structures are unremarkable.  There are no extra-axial fluid collections.  There is no evidence of intracranial hemorrhage.  The ventricles and sulci are within normal limits.  The gray-white differentiation is maintained.  There is no dense vessel sign.  There is no evidence of mass effect.                                 Medical Decision Making:   Clinical Tests:   Lab Tests: Ordered and Reviewed  Radiological Study: Ordered and Reviewed  ED Management:  63-year-old male presents with feeling weak and off over the last 1 and half weeks.  States he thinks there is something wrong with his sugar however today there a 100.  He is only on metformin which is very atypical to have significant swings in glucose level.  He states the right leg is a little bit more weak than the left.  On my physical exam I do not see any difference in weakness. I told the patient I was going to get some blood work as well as radiologic studies however he stated he had to be gone would 30 min in order to get a bed [facility] the homeless shelter. the patient does take xanax and pain medication which is the likely cause of the patients symptoms.    Based on this time when I feel feel that the best test with the time frame the patient has given me  is a CT of the brain to rule out stroke that he may potentially have had a week and half ago although I have a low suspicion based on no focal neurological deficits on examination. I did offer blood work but he states he will not stay for it.  He states if he feels he needs he can come back tomorrow.    The patient's Accu-Chek is 100.  I do recommend the getting blood work on the patient but he refuses to stay and needs to go now.  He will sign against medical advice and strongly urged the come back to as soon as possible.  States he may come back tomorrow when he gets out of the shelter.    I let him know of risk including, heart issues, kidney failure, death. He will still leave AMA    This is the extent to the patients complaints today here in the emergency department.            Scribe Attestation:   Scribe #1: I performed the above scribed service and the documentation accurately describes the services I performed. I attest to the accuracy of the note.    Attending Attestation:           Physician Attestation for Scribe:  Physician Attestation Statement for Scribe #1: I, Dr. Oglesby, reviewed documentation, as scribed by Erin Garza in my presence, and it is both accurate and complete.                    Clinical Impression:     1. Weakness                                   Jacobo Oglesby, DO  01/12/19 8135

## 2019-01-12 NOTE — LETTER
Patient: Fadi Richter  YOB: 1955  Date: 1/12/2019 Time: 4:05 PM  Location: [facility]    Leaving the Hospital Against Medical Advice    Chart #:[id number]    This will certify that I, the undersigned,    ______________________________________________________________________    A patient in the above named medical center, having requested discharge and removal from the medical [place] against the advice of my attending physician(s), hereby release [facility] ([facility]), its physicians, officers and employees, severally and individually, from any and all liability of any nature whatsoever for any injury or harm or complication of any kind that may result directly or indirectly, by reason of my terminating my stay as a patient at [facility] and my departure from [facility], and hereby waive any and all rights of action I may now have or later acquire as a result of my voluntary departure from [facility] and the termination of my stay as a patient therein.    This release is made with the full knowledge of the danger that may result from the action which I am taking.      Date:_______________________                         ___________________________                                                                                    Patient/Legal Representative    Witness:        ____________________________                          ___________________________  Nurse                                                                        Physician

## 2019-01-12 NOTE — LETTER
Patient: Fadi Richter  YOB: 1955  Date: 1/12/2019 Time: 4:05 PM  Location: [facility]    Leaving the Hospital Against Medical Advice    Chart #:[id number]    This will certify that I, the undersigned,    ______________________________________________________________________    A patient in the above named medical center, having requested discharge and removal from the medical [place] against the advice of my attending physician(s), hereby release [facility] ([facility]), its physicians, officers and employees, severally and individually, from any and all liability of any nature whatsoever for any injury or harm or complication of any kind that may result directly or indirectly, by reason of my terminating my stay as a patient at [facility] and my departure from [facility], and hereby waive any and all rights of action I may now have or later acquire as a result of my voluntary departure from [facility] and the termination of my stay as a patient therein.    This release is made with the full knowledge of the danger that may result from the action which I am taking.      Date:_______________________                         ___________________________                                                                                    Patient/Legal Representative    Witness:        ____________________________                          ___________________________  Nurse                                                                        Physician

## 2019-01-13 ENCOUNTER — HOSPITAL ENCOUNTER (EMERGENCY)
Facility: OTHER | Age: 64
Discharge: HOME | End: 2019-01-13
Attending: EMERGENCY MEDICINE
Payer: MEDICARE

## 2019-01-13 VITALS
WEIGHT: 171 LBS | OXYGEN SATURATION: 99 % | HEART RATE: 111 BPM | RESPIRATION RATE: 22 BRPM | SYSTOLIC BLOOD PRESSURE: 132 MMHG | DIASTOLIC BLOOD PRESSURE: 63 MMHG | TEMPERATURE: 98 F | BODY MASS INDEX: 26.84 KG/M2 | HEIGHT: 67 IN

## 2019-01-13 DIAGNOSIS — R53.1 WEAKNESS: Primary | ICD-10-CM

## 2019-01-13 LAB
AMPHET+METHAMPHET UR QL: NEGATIVE
ANION GAP SERPL CALC-SCNC: 8 MMOL/L
BACTERIA #/AREA URNS HPF: ABNORMAL /HPF
BARBITURATES UR QL SCN>200 NG/ML: NEGATIVE
BASOPHILS # BLD AUTO: 0.03 K/UL
BASOPHILS NFR BLD: 0.4 %
BENZODIAZ UR QL SCN>200 NG/ML: NORMAL
BILIRUB UR QL STRIP: ABNORMAL
BUN SERPL-MCNC: 13 MG/DL
BZE UR QL SCN: NEGATIVE
CALCIUM SERPL-MCNC: 9.1 MG/DL
CANNABINOIDS UR QL SCN: NORMAL
CHLORIDE SERPL-SCNC: 107 MMOL/L
CLARITY UR: CLEAR
CO2 SERPL-SCNC: 23 MMOL/L
COLOR UR: YELLOW
CREAT SERPL-MCNC: 0.9 MG/DL
CREAT UR-MCNC: 205 MG/DL
DIFFERENTIAL METHOD: ABNORMAL
EOSINOPHIL # BLD AUTO: 0.1 K/UL
EOSINOPHIL NFR BLD: 1 %
ERYTHROCYTE [DISTWIDTH] IN BLOOD BY AUTOMATED COUNT: 13.4 %
EST. GFR  (AFRICAN AMERICAN): >60 ML/MIN/1.73 M^2
EST. GFR  (NON AFRICAN AMERICAN): >60 ML/MIN/1.73 M^2
ETHANOL SERPL-MCNC: <10 MG/DL
GLUCOSE SERPL-MCNC: 116 MG/DL
GLUCOSE UR QL STRIP: NEGATIVE
HCT VFR BLD AUTO: 43.8 %
HGB BLD-MCNC: 15 G/DL
HGB UR QL STRIP: NEGATIVE
INFLUENZA A, MOLECULAR: NEGATIVE
INFLUENZA B, MOLECULAR: NEGATIVE
KETONES UR QL STRIP: ABNORMAL
LEUKOCYTE ESTERASE UR QL STRIP: ABNORMAL
LYMPHOCYTES # BLD AUTO: 2.1 K/UL
LYMPHOCYTES NFR BLD: 27 %
MCH RBC QN AUTO: 33 PG
MCHC RBC AUTO-ENTMCNC: 34.2 G/DL
MCV RBC AUTO: 97 FL
METHADONE UR QL SCN>300 NG/ML: NEGATIVE
MICROSCOPIC COMMENT: ABNORMAL
MONOCYTES # BLD AUTO: 0.5 K/UL
MONOCYTES NFR BLD: 6.8 %
NEUTROPHILS # BLD AUTO: 5.1 K/UL
NEUTROPHILS NFR BLD: 64.5 %
NITRITE UR QL STRIP: POSITIVE
OPIATES UR QL SCN: NEGATIVE
PCP UR QL SCN>25 NG/ML: NEGATIVE
PH UR STRIP: 6 [PH] (ref 5–8)
PLATELET # BLD AUTO: 112 K/UL
PMV BLD AUTO: 10.3 FL
POCT GLUCOSE: 129 MG/DL (ref 70–110)
POTASSIUM SERPL-SCNC: 3.9 MMOL/L
PROT UR QL STRIP: NEGATIVE
RBC # BLD AUTO: 4.54 M/UL
RBC #/AREA URNS HPF: 7 /HPF (ref 0–4)
SODIUM SERPL-SCNC: 138 MMOL/L
SP GR UR STRIP: 1.01 (ref 1–1.03)
SPECIMEN SOURCE: NORMAL
SQUAMOUS #/AREA URNS HPF: 1 /HPF
TOXICOLOGY INFORMATION: NORMAL
TROPONIN I SERPL DL<=0.01 NG/ML-MCNC: 0.01 NG/ML
URN SPEC COLLECT METH UR: ABNORMAL
UROBILINOGEN UR STRIP-ACNC: ABNORMAL EU/DL
WBC # BLD AUTO: 7.9 K/UL
WBC #/AREA URNS HPF: 90 /HPF (ref 0–5)
WBC CLUMPS URNS QL MICRO: ABNORMAL
YEAST URNS QL MICRO: ABNORMAL

## 2019-01-13 PROCEDURE — 80307 DRUG TEST PRSMV CHEM ANLYZR: CPT

## 2019-01-13 PROCEDURE — 80320 DRUG SCREEN QUANTALCOHOLS: CPT

## 2019-01-13 PROCEDURE — 99284 EMERGENCY DEPT VISIT MOD MDM: CPT | Mod: 25

## 2019-01-13 PROCEDURE — 81000 URINALYSIS NONAUTO W/SCOPE: CPT | Mod: 59

## 2019-01-13 PROCEDURE — 87186 SC STD MICRODIL/AGAR DIL: CPT

## 2019-01-13 PROCEDURE — 93005 ELECTROCARDIOGRAM TRACING: CPT

## 2019-01-13 PROCEDURE — 84484 ASSAY OF TROPONIN QUANT: CPT

## 2019-01-13 PROCEDURE — 87086 URINE CULTURE/COLONY COUNT: CPT

## 2019-01-13 PROCEDURE — 96372 THER/PROPH/DIAG INJ SC/IM: CPT

## 2019-01-13 PROCEDURE — 93010 EKG 12-LEAD: ICD-10-PCS | Mod: ,,, | Performed by: INTERNAL MEDICINE

## 2019-01-13 PROCEDURE — 93010 ELECTROCARDIOGRAM REPORT: CPT | Mod: ,,, | Performed by: INTERNAL MEDICINE

## 2019-01-13 PROCEDURE — 85025 COMPLETE CBC W/AUTO DIFF WBC: CPT

## 2019-01-13 PROCEDURE — 87077 CULTURE AEROBIC IDENTIFY: CPT

## 2019-01-13 PROCEDURE — 87502 INFLUENZA DNA AMP PROBE: CPT

## 2019-01-13 PROCEDURE — 82962 GLUCOSE BLOOD TEST: CPT

## 2019-01-13 PROCEDURE — 63600175 PHARM REV CODE 636 W HCPCS: Performed by: EMERGENCY MEDICINE

## 2019-01-13 PROCEDURE — 87088 URINE BACTERIA CULTURE: CPT

## 2019-01-13 PROCEDURE — 80048 BASIC METABOLIC PNL TOTAL CA: CPT

## 2019-01-13 RX ORDER — KETOROLAC TROMETHAMINE 30 MG/ML
30 INJECTION, SOLUTION INTRAMUSCULAR; INTRAVENOUS
Status: COMPLETED | OUTPATIENT
Start: 2019-01-13 | End: 2019-01-13

## 2019-01-13 RX ADMIN — KETOROLAC TROMETHAMINE 30 MG: 30 INJECTION, SOLUTION INTRAMUSCULAR at 11:01

## 2019-01-13 NOTE — ED PROVIDER NOTES
"Encounter Date: 1/13/2019    SCRIBE #1 NOTE: I, Ashish Bates, am scribing for, and in the presence of, Dr. Oglesby.       History     Chief Complaint   Patient presents with    Fatigue     Pt c/o weakness & dizziness X 1 week.  Pt c/o productive cough with green mucous expectorated, unable to sleep during the day secondary to cough. Pt also c/o nausea. Pt states "I was here yesterday, they wanted to do blood work on me, but I coldn't stay ... I told them I'd come back to day." Pt also c/o that his glucose levels have been fluctuating.     Seen by provider: 11:23 AM    Patient is a 63 y.o. male with a history of DM who presents to the ED with complaint of cough for about 1.5 weeks. He reports associated fatigue, generalized weakness, dizziness, nausea, and sleep disturbance secondary to cough. He reports left sided chest pain with cough and deep inspiration. He also notes chronic left pain. He denies shortness of breath, vomiting, or fever. He admits he is currently taking xanax and pain medication. He is homeless. He was seen here in the ED yesterday, but left AMA prior to completing work up.       The history is provided by the patient.     Review of patient's allergies indicates:  No Known Allergies  Past Medical History:   Diagnosis Date    Anxiety     Diabetes mellitus 2017    Kidney stone     Urinary tract infection      Past Surgical History:   Procedure Laterality Date    KIDNEY STONE SURGERY      NECK SURGERY      secondary to previous fall     Family History   Problem Relation Age of Onset    Hypertension Mother     Diabetes Mother     Stroke Mother     Prostate cancer Neg Hx     Kidney disease Neg Hx      Social History     Tobacco Use    Smoking status: Current Every Day Smoker     Packs/day: 1.50     Years: 40.00     Pack years: 60.00    Smokeless tobacco: Never Used   Substance Use Topics    Alcohol use: No    Drug use: Yes     Types: Oxycodone     Review of Systems "   Constitutional: Positive for fatigue. Negative for fever.   HENT: Positive for congestion.    Respiratory: Positive for cough. Negative for shortness of breath.    Cardiovascular: Positive for chest pain.   Gastrointestinal: Positive for nausea.   Genitourinary: Negative for dysuria.   Musculoskeletal: Positive for neck pain (chronic). Negative for back pain.   Skin: Negative for rash.   Neurological: Positive for dizziness and weakness (generalized). Negative for headaches.   Psychiatric/Behavioral: Positive for sleep disturbance. Negative for confusion.       Physical Exam     Initial Vitals [01/13/19 0924]   BP Pulse Resp Temp SpO2   132/63 (!) 111 (!) 22 97.8 °F (36.6 °C) 99 %      MAP       --         Physical Exam    Nursing note and vitals reviewed.  Constitutional: He appears well-developed and well-nourished. He is not diaphoretic. No distress.   HENT:   Head: Normocephalic and atraumatic.   Eyes: Conjunctivae and EOM are normal. Pupils are equal, round, and reactive to light.   Neck: Normal range of motion. Neck supple.   Cardiovascular: Regular rhythm and normal heart sounds. Tachycardia present.    Pulmonary/Chest: Breath sounds normal. No stridor. No respiratory distress. He has no wheezes. He has no rhonchi. He has no rales.   Abdominal: Soft. He exhibits no distension. There is no tenderness.   Musculoskeletal: Normal range of motion. He exhibits no edema or tenderness.   Neurological: He is alert and oriented to person, place, and time. He has normal strength. No cranial nerve deficit or sensory deficit. GCS score is 15. GCS eye subscore is 4. GCS verbal subscore is 5. GCS motor subscore is 6.   Cranial nerves II through XII grossly intact. Gait normal. No focal neurologic deficits. No ataxia.   Skin: Skin is warm and dry.   Psychiatric: He has a normal mood and affect. His behavior is normal. Judgment and thought content normal.         ED Course   Procedures  Labs Reviewed   CBC W/ AUTO  DIFFERENTIAL - Abnormal; Notable for the following components:       Result Value    RBC 4.54 (*)     MCH 33.0 (*)     Platelets 112 (*)     All other components within normal limits   BASIC METABOLIC PANEL - Abnormal; Notable for the following components:    Glucose 116 (*)     All other components within normal limits   URINALYSIS, REFLEX TO URINE CULTURE - Abnormal; Notable for the following components:    Ketones, UA Trace (*)     Bilirubin (UA) 1+ (*)     Nitrite, UA Positive (*)     Urobilinogen, UA 2.0-3.0 (*)     Leukocytes, UA 1+ (*)     All other components within normal limits    Narrative:     Preferred Collection Type->Urine, Clean Catch   URINALYSIS MICROSCOPIC - Abnormal; Notable for the following components:    RBC, UA 7 (*)     WBC, UA 90 (*)     Bacteria, UA Many (*)     Yeast, UA Rare (*)     All other components within normal limits    Narrative:     Preferred Collection Type->Urine, Clean Catch   POCT GLUCOSE - Abnormal; Notable for the following components:    POCT Glucose 129 (*)     All other components within normal limits   INFLUENZA A & B BY MOLECULAR   CULTURE, URINE   TROPONIN I   DRUG SCREEN PANEL, URINE EMERGENCY    Narrative:     Preferred Collection Type->Urine, Clean Catch   ALCOHOL,MEDICAL (ETHANOL)     EKG Readings: (Independently Interpreted)   Initial Reading: No STEMI. Rhythm: Normal Sinus Rhythm. Heart Rate: 83. Ectopy: No Ectopy. Conduction: Normal. ST Segments: Normal ST Segments. T Waves: Normal. Clinical Impression: Normal Sinus Rhythm       Imaging Results          X-Ray Chest PA And Lateral (Final result)  Result time 01/13/19 12:13:52    Final result by Gino Rendon Jr., MD (01/13/19 12:13:52)                 Impression:      There is mild increase in interstitial markings nonspecific.  Correlation with clinical findings will be needed.  No confluent consolidation.      Electronically signed by: Gino Rendon MD  Date:    01/13/2019  Time:    12:13              Narrative:    EXAMINATION:  XR CHEST PA AND LATERAL    CLINICAL HISTORY:  Weakness    TECHNIQUE:  PA and lateral views of the chest were performed.    COMPARISON:  None    FINDINGS:  Heart size is normal.  Mild increase in the pulmonary vascular and interstitial markings.  No confluent consolidation.  Postop change in the spine.  Exaggeration of the normal thoracic kyphosis with some disc space narrowing and anterior osteophytes.                                 Medical Decision Making:   Independently Interpreted Test(s):   I have ordered and independently interpreted EKG Reading(s) - see prior notes  Clinical Tests:   Lab Tests: Ordered and Reviewed  Radiological Study: Reviewed and Ordered  Medical Tests: Ordered and Reviewed  ED Management:  63-year-old male presents with fatigue.  I saw the patient yesterday who would not wait for blood work.  Has been feeling bad for the last week and a half.  Will get some blood work.  CT scan was done yesterday.  This was negative for stroke.  Patient has no focal neurological deficits.  Patient is noted to be taken a Xanax here in the emergency department.    A blood work reviewed showing no acute abnormality.      Patient discharged home in stable condition. Diagnosis and treatment plan explained to patient. I have answered all questions and the patient is satisfied with the plan of care. The patient demonstrates understanding of the care plan. This is the extent to the patients complaints today here in the emergency department.    1:45 p.m. After I reviewed blood work the patient still had not given a urine sample.  He stated he was ready to go.  As he had no urinary complaints I feel this is reasonable.  After reviewing my lab tests his urine did come back which must have been drawn prior to his being discharged.  Does look like it is consistent with urinary tract infection although he was asymptomatic with denial of burning with urination or increased frequency. I  attempted to call the patient on his phone number that he has listed in the chart however it is a busy signal.  Will attempt to track down the patient if not gaited a certified letter.            Scribe Attestation:   Scribe #1: I performed the above scribed service and the documentation accurately describes the services I performed. I attest to the accuracy of the note.    Attending Attestation:           Physician Attestation for Scribe:  Physician Attestation Statement for Scribe #1: I, Dr. Oglesby, reviewed documentation, as scribed by Ashish Bates in my presence, and it is both accurate and complete.                    Clinical Impression:     1. Weakness                              Jacobo Oglesby, DO  01/13/19 1380

## 2019-01-13 NOTE — ED NOTES
Pt to ED with multiple complaints, reporting cough, posterior neck pain, difficulty urinating. Pt AAOx4 and appropriate at this time. Respirations even and unlabored. No acute distress noted.

## 2019-01-14 ENCOUNTER — OFFICE VISIT (OUTPATIENT)
Dept: URGENT CARE | Facility: CLINIC | Age: 64
End: 2019-01-14
Payer: MEDICARE

## 2019-01-14 VITALS
OXYGEN SATURATION: 98 % | SYSTOLIC BLOOD PRESSURE: 127 MMHG | HEART RATE: 102 BPM | WEIGHT: 171 LBS | TEMPERATURE: 99 F | DIASTOLIC BLOOD PRESSURE: 82 MMHG | RESPIRATION RATE: 18 BRPM | HEIGHT: 67 IN | BODY MASS INDEX: 26.84 KG/M2

## 2019-01-14 DIAGNOSIS — Z76.0 MEDICATION REFILL: Primary | ICD-10-CM

## 2019-01-14 DIAGNOSIS — R05.9 COUGH IN ADULT PATIENT: ICD-10-CM

## 2019-01-14 PROCEDURE — 99214 OFFICE O/P EST MOD 30 MIN: CPT | Mod: S$GLB,,, | Performed by: NURSE PRACTITIONER

## 2019-01-14 PROCEDURE — 99214 PR OFFICE/OUTPT VISIT, EST, LEVL IV, 30-39 MIN: ICD-10-PCS | Mod: S$GLB,,, | Performed by: NURSE PRACTITIONER

## 2019-01-14 RX ORDER — PROMETHAZINE HYDROCHLORIDE AND DEXTROMETHORPHAN HYDROBROMIDE 6.25; 15 MG/5ML; MG/5ML
5 SYRUP ORAL NIGHTLY PRN
Qty: 120 ML | Refills: 0 | Status: SHIPPED | OUTPATIENT
Start: 2019-01-14 | End: 2019-02-02

## 2019-01-14 RX ORDER — PROMETHAZINE HYDROCHLORIDE AND DEXTROMETHORPHAN HYDROBROMIDE 6.25; 15 MG/5ML; MG/5ML
5 SYRUP ORAL NIGHTLY PRN
Qty: 120 ML | Refills: 0 | Status: SHIPPED | OUTPATIENT
Start: 2019-01-14 | End: 2019-01-14

## 2019-01-14 RX ORDER — METFORMIN HYDROCHLORIDE 500 MG/1
500 TABLET ORAL 2 TIMES DAILY WITH MEALS
Qty: 60 TABLET | Refills: 11 | Status: SHIPPED | OUTPATIENT
Start: 2019-01-14 | End: 2019-01-14

## 2019-01-14 RX ORDER — METFORMIN HYDROCHLORIDE 500 MG/1
500 TABLET ORAL 2 TIMES DAILY WITH MEALS
Qty: 60 TABLET | Refills: 0 | Status: SHIPPED | OUTPATIENT
Start: 2019-01-14 | End: 2019-02-13

## 2019-01-14 NOTE — PROGRESS NOTES
"Subjective:       Patient ID: Josiah Richter is a 63 y.o. male.    Vitals:  height is 5' 7" (1.702 m) and weight is 77.6 kg (171 lb). His temperature is 98.6 °F (37 °C). His blood pressure is 127/82 and his pulse is 102. His respiration is 18 and oxygen saturation is 98%.     Chief Complaint: Cough    Patient states that he has been coughing for 2 weeks. He states that he came here for promethazine cough medicine and never received his prescription. Patient was just in ER last night with light head and dizziness and he wants a PCP.       Cough   This is a new problem. Episode onset: 2 weeks. The problem has been rapidly worsening. The cough is productive of sputum (Green sputum). Pertinent negatives include no chills, ear pain, eye redness, fever, hemoptysis, myalgias, rash, sore throat, shortness of breath or wheezing. Nothing aggravates the symptoms. He has tried nothing (prescription not filled) for the symptoms. The treatment provided no relief.       Constitution: Negative for chills, sweating, fatigue and fever.   HENT: Negative for ear pain, congestion, sinus pain, sinus pressure, sore throat and voice change.    Neck: Negative for painful lymph nodes.   Eyes: Negative for eye redness.   Respiratory: Positive for cough and sputum production. Negative for chest tightness, bloody sputum, COPD, shortness of breath, stridor, wheezing and asthma.         Green   sputum     Gastrointestinal: Negative for nausea and vomiting.   Musculoskeletal: Negative for muscle ache.   Skin: Negative for rash.   Allergic/Immunologic: Negative for seasonal allergies and asthma.   Hematologic/Lymphatic: Negative for swollen lymph nodes.       Objective:      Physical Exam   Constitutional: He is oriented to person, place, and time. He appears well-developed and well-nourished. He is cooperative.  Non-toxic appearance. He does not appear ill. No distress.   HENT:   Head: Normocephalic and atraumatic.   Right Ear: Hearing, " tympanic membrane, external ear and ear canal normal.   Left Ear: Hearing, tympanic membrane, external ear and ear canal normal.   Nose: Nose normal. No mucosal edema, rhinorrhea or nasal deformity. No epistaxis. Right sinus exhibits no maxillary sinus tenderness and no frontal sinus tenderness. Left sinus exhibits no maxillary sinus tenderness and no frontal sinus tenderness.   Mouth/Throat: Uvula is midline, oropharynx is clear and moist and mucous membranes are normal. No trismus in the jaw. Normal dentition. No uvula swelling. No posterior oropharyngeal erythema.   Eyes: Conjunctivae and lids are normal. No scleral icterus.   Sclera clear bilat   Neck: Trachea normal, full passive range of motion without pain and phonation normal. Neck supple.   Cardiovascular: Normal rate, regular rhythm, normal heart sounds, intact distal pulses and normal pulses.   Pulmonary/Chest: Effort normal and breath sounds normal. No respiratory distress.   Abdominal: Soft. Normal appearance and bowel sounds are normal. He exhibits no distension. There is no tenderness.   Musculoskeletal: Normal range of motion. He exhibits no edema or deformity.   Neurological: He is alert and oriented to person, place, and time. He exhibits normal muscle tone. Coordination normal.   Skin: Skin is warm, dry and intact. He is not diaphoretic. No pallor.   Psychiatric: He has a normal mood and affect. His speech is normal and behavior is normal. Judgment and thought content normal. Cognition and memory are normal.   Nursing note and vitals reviewed.      Assessment:       1. Medication refill    2. Cough in adult patient        Plan:         Medication refill  -     metFORMIN (GLUCOPHAGE) 500 MG tablet; Take 1 tablet (500 mg total) by mouth 2 (two) times daily with meals.  Dispense: 60 tablet; Refill: 11    Cough in adult patient  -     promethazine-dextromethorphan (PROMETHAZINE-DM) 6.25-15 mg/5 mL Syrp; Take 5 mLs by mouth nightly as needed.   Dispense: 120 mL; Refill: 0    Patient given a PCP contact information. Patient educated that we do not give out refill of medications. This is a one time occurrence. Patient to use cough medicine at night time, patient educated that cough medicine might make patient sleepy to just use at night.     Patient Instructions     Cough, Chronic, Uncertain Cause (Adult)    Everyone has had a cough as part of the common cold, flu, or bronchitis. This kind of cough occurs along with an achy feeling, low-grade fever, nasal and sinus congestion, and a scratchy or sore throat. This usually gets better in 2 to 3 weeks. A cough that lasts longer than 3 weeks may be due to other causes.  If your cough does not improve over the next 2 weeks, further testing may be needed. Follow up with your healthcare provider as advised. Cough suppressants may be recommended. Based on your exam today, the exact cause of your cough is not certain. Below are some common causes for persistent cough.  Smokers cough  Smokers cough doesnt go away. If you continue to smoke, it only gets worse. The cough is from irritation in the air passages. Talk to your healthcare provider about quitting. Medicines or nicotine-replacement products, like gum or the patch, may make quitting easier.  Postnasal drip  A cough that is worse at night may be due to postnasal drip. Excess mucus in the nose drains from the back of your nose to your throat. This triggers the cough reflex. Postnasal drip may be due to a sinus infection or allergy. Common allergens include dust, tobacco smoke (both inhaled and secondhand smoke), environmental pollutants, pollen, mold, pets, cleaning agents, room deodorizers, and chemical fumes. Over-the-counter antihistamines or decongestants may be helpful for allergies. A sinus infection may requires antibiotic treatment. See your healthcare provider if symptoms continue.  Medicines  Certain prescribed medicines can cause a chronic cough in  some people:  · ACE inhibitors for high blood pressure. These include benazepril, captopril, enalapril, fosinopril, lisinopril, quinapril, ramipril, and others.  · Beta-blockers for high blood pressure and other conditions. These include propranolol, atenolol, metoprolol, nadolol, and others.  Let your healthcare provider know if you are taking any of these.  Asthma  Cough may be the only sign of mild asthma. You may have tests to find out if asthma is causing your cough. You may also take asthma medicine on a trial basis.  Acid reflux (heartburn, GERD)  The esophagus is the tube that carries food from the mouth to the stomach. A valve at its lower end prevents stomach acids from flowing upward. If this valve does not work properly, acid from the stomach enters the esophagus. This may cause a burning pain in the upper abdomen or lower chest, belching, or cough. Symptoms are often worse when lying flat. Avoid eating or drinking before bedtime. Try using extra pillows to raise your upper body, or place 4-inch blocks under the head of your bed. You may try an over-the-counter antacid or an acid-blocking medicine such as famotidine, cimetidine, ranitidine, esomeprazole, lansoprazole, or omeprazole. Stronger medicines for this condition can be prescribed by your healthcare provider.  Follow-up care  Follow up with your healthcare provider, or as advised, if your cough does not improve. Further testing may be needed.  Note: If an X-ray was taken, a specialist will review it. You will be notified of any new findings that may affect your care.  When to seek medical advice  Call your healthcare provider right away if any of these occur:  · Mild wheezing or difficulty breathing  · Fever of 100.4ºF (38ºC) or higher, or as directed by your healthcare provider  · Unexpected weight loss  · Coughing up large amounts of colored sputum  · Night sweats (sheets and pajamas get soaking wet)  Call 911, or get immediate medical  care  Contact emergency services right away if any of these occur:  · Coughing up blood  · Moderate to severe trouble breathing or wheezing  Date Last Reviewed: 9/13/2015 © 2000-2017 [employer]. [address] [place] [address], [place] [address]. All rights reserved. This information is not intended as a substitute for professional medical care. Always follow your healthcare professional's instructions.    Please drink plenty of fluids.  Please get plenty of rest.  Please return here or go to the Emergency Department for any concerns or worsening of condition.  If you were prescribed antibiotics, please take them to completion.  If not allergic, please take over the counter Tylenol (Acetaminophen) as directed for control of pain and/or fever.  Motrin (advil) or Alleve may help a fever, but they may also worsen your Nausea and Vomiting.    Please follow up with your primary care doctor or specialist as needed.    Lynsey Bradford MD  [phone]    If you  smoke, please stop smoking.

## 2019-01-14 NOTE — PATIENT INSTRUCTIONS
Cough, Chronic, Uncertain Cause (Adult)    Everyone has had a cough as part of the common cold, flu, or bronchitis. This kind of cough occurs along with an achy feeling, low-grade fever, nasal and sinus congestion, and a scratchy or sore throat. This usually gets better in 2 to 3 weeks. A cough that lasts longer than 3 weeks may be due to other causes.  If your cough does not improve over the next 2 weeks, further testing may be needed. Follow up with your healthcare provider as advised. Cough suppressants may be recommended. Based on your exam today, the exact cause of your cough is not certain. Below are some common causes for persistent cough.  Smokers cough  Smokers cough doesnt go away. If you continue to smoke, it only gets worse. The cough is from irritation in the air passages. Talk to your healthcare provider about quitting. Medicines or nicotine-replacement products, like gum or the patch, may make quitting easier.  Postnasal drip  A cough that is worse at night may be due to postnasal drip. Excess mucus in the nose drains from the back of your nose to your throat. This triggers the cough reflex. Postnasal drip may be due to a sinus infection or allergy. Common allergens include dust, tobacco smoke (both inhaled and secondhand smoke), environmental pollutants, pollen, mold, pets, cleaning agents, room deodorizers, and chemical fumes. Over-the-counter antihistamines or decongestants may be helpful for allergies. A sinus infection may requires antibiotic treatment. See your healthcare provider if symptoms continue.  Medicines  Certain prescribed medicines can cause a chronic cough in some people:  · ACE inhibitors for high blood pressure. These include benazepril, captopril, enalapril, fosinopril, lisinopril, quinapril, ramipril, and others.  · Beta-blockers for high blood pressure and other conditions. These include propranolol, atenolol, metoprolol, nadolol, and others.  Let your healthcare provider  know if you are taking any of these.  Asthma  Cough may be the only sign of mild asthma. You may have tests to find out if asthma is causing your cough. You may also take asthma medicine on a trial basis.  Acid reflux (heartburn, GERD)  The esophagus is the tube that carries food from the mouth to the stomach. A valve at its lower end prevents stomach acids from flowing upward. If this valve does not work properly, acid from the stomach enters the esophagus. This may cause a burning pain in the upper abdomen or lower chest, belching, or cough. Symptoms are often worse when lying flat. Avoid eating or drinking before bedtime. Try using extra pillows to raise your upper body, or place 4-inch blocks under the head of your bed. You may try an over-the-counter antacid or an acid-blocking medicine such as famotidine, cimetidine, ranitidine, esomeprazole, lansoprazole, or omeprazole. Stronger medicines for this condition can be prescribed by your healthcare provider.  Follow-up care  Follow up with your healthcare provider, or as advised, if your cough does not improve. Further testing may be needed.  Note: If an X-ray was taken, a specialist will review it. You will be notified of any new findings that may affect your care.  When to seek medical advice  Call your healthcare provider right away if any of these occur:  · Mild wheezing or difficulty breathing  · Fever of 100.4ºF (38ºC) or higher, or as directed by your healthcare provider  · Unexpected weight loss  · Coughing up large amounts of colored sputum  · Night sweats (sheets and pajamas get soaking wet)  Call 911, or get immediate medical care  Contact emergency services right away if any of these occur:  · Coughing up blood  · Moderate to severe trouble breathing or wheezing  Date Last Reviewed: 9/13/2015  © 2808-1362 [employer]. [address] [place] [address], [place] [address]. All rights reserved. This information is not intended as a substitute for  professional medical care. Always follow your healthcare professional's instructions.    Please drink plenty of fluids.  Please get plenty of rest.  Please return here or go to the Emergency Department for any concerns or worsening of condition.  If you were prescribed antibiotics, please take them to completion.  If not allergic, please take over the counter Tylenol (Acetaminophen) as directed for control of pain and/or fever.  Motrin (advil) or Alleve may help a fever, but they may also worsen your Nausea and Vomiting.    Please follow up with your primary care doctor or specialist as needed.    Lynsey Bradford MD  [phone]    If you  smoke, please stop smoking.

## 2019-01-15 ENCOUNTER — HOSPITAL ENCOUNTER (EMERGENCY)
Facility: OTHER | Age: 64
Discharge: HOME | End: 2019-01-15
Attending: EMERGENCY MEDICINE
Payer: MEDICARE

## 2019-01-15 VITALS
TEMPERATURE: 98 F | HEIGHT: 66 IN | BODY MASS INDEX: 27.48 KG/M2 | RESPIRATION RATE: 18 BRPM | DIASTOLIC BLOOD PRESSURE: 69 MMHG | OXYGEN SATURATION: 96 % | SYSTOLIC BLOOD PRESSURE: 123 MMHG | WEIGHT: 171 LBS | HEART RATE: 84 BPM

## 2019-01-15 DIAGNOSIS — R07.9 CHEST PAIN: ICD-10-CM

## 2019-01-15 DIAGNOSIS — J20.9 ACUTE BRONCHITIS, UNSPECIFIED ORGANISM: ICD-10-CM

## 2019-01-15 DIAGNOSIS — R05.9 COUGH: Primary | ICD-10-CM

## 2019-01-15 LAB
ALBUMIN SERPL BCP-MCNC: 3.1 G/DL
ALLENS TEST: ABNORMAL
ALP SERPL-CCNC: 69 U/L
ALT SERPL W/O P-5'-P-CCNC: 60 U/L
ANION GAP SERPL CALC-SCNC: 7 MMOL/L
ANISOCYTOSIS BLD QL SMEAR: SLIGHT
AST SERPL-CCNC: 58 U/L
BASOPHILS # BLD AUTO: ABNORMAL K/UL
BASOPHILS NFR BLD: 1 %
BILIRUB SERPL-MCNC: 0.6 MG/DL
BUN SERPL-MCNC: 14 MG/DL
CALCIUM SERPL-MCNC: 8.8 MG/DL
CHLORIDE SERPL-SCNC: 108 MMOL/L
CO2 SERPL-SCNC: 26 MMOL/L
CREAT SERPL-MCNC: 1.1 MG/DL
DELSYS: ABNORMAL
DIFFERENTIAL METHOD: ABNORMAL
EOSINOPHIL # BLD AUTO: ABNORMAL K/UL
EOSINOPHIL NFR BLD: 4 %
ERYTHROCYTE [DISTWIDTH] IN BLOOD BY AUTOMATED COUNT: 13.4 %
EST. GFR  (AFRICAN AMERICAN): >60 ML/MIN/1.73 M^2
EST. GFR  (NON AFRICAN AMERICAN): >60 ML/MIN/1.73 M^2
FIO2: 21
GLUCOSE SERPL-MCNC: 168 MG/DL
HCO3 UR-SCNC: 23.8 MMOL/L (ref 24–28)
HCT VFR BLD AUTO: 41.2 %
HGB BLD-MCNC: 13.8 G/DL
LACTATE SERPL-SCNC: 1.7 MMOL/L
LYMPHOCYTES # BLD AUTO: ABNORMAL K/UL
LYMPHOCYTES NFR BLD: 45 %
MCH RBC QN AUTO: 32.3 PG
MCHC RBC AUTO-ENTMCNC: 33.5 G/DL
MCV RBC AUTO: 97 FL
MODE: ABNORMAL
MONOCYTES # BLD AUTO: ABNORMAL K/UL
MONOCYTES NFR BLD: 8 %
NEUTROPHILS # BLD AUTO: ABNORMAL K/UL
NEUTROPHILS NFR BLD: 42 %
PCO2 BLDA: 44.3 MMHG (ref 35–45)
PH SMN: 7.34 [PH] (ref 7.35–7.45)
PLATELET # BLD AUTO: 116 K/UL
PLATELET BLD QL SMEAR: ABNORMAL
PMV BLD AUTO: 10 FL
PO2 BLDA: 60 MMHG (ref 80–100)
POC BE: -2 MMOL/L
POC SATURATED O2: 89 % (ref 95–100)
POCT GLUCOSE: 161 MG/DL (ref 70–110)
POTASSIUM SERPL-SCNC: 3.8 MMOL/L
PROT SERPL-MCNC: 7.2 G/DL
RBC # BLD AUTO: 4.27 M/UL
SAMPLE: ABNORMAL
SITE: ABNORMAL
SODIUM SERPL-SCNC: 141 MMOL/L
SP02: 90
TROPONIN I SERPL DL<=0.01 NG/ML-MCNC: 0.02 NG/ML
WBC # BLD AUTO: 4.2 K/UL

## 2019-01-15 PROCEDURE — 93010 EKG 12-LEAD: ICD-10-PCS | Mod: ,,, | Performed by: INTERNAL MEDICINE

## 2019-01-15 PROCEDURE — 96374 THER/PROPH/DIAG INJ IV PUSH: CPT

## 2019-01-15 PROCEDURE — 82962 GLUCOSE BLOOD TEST: CPT

## 2019-01-15 PROCEDURE — 99291 CRITICAL CARE FIRST HOUR: CPT | Mod: 25

## 2019-01-15 PROCEDURE — 84484 ASSAY OF TROPONIN QUANT: CPT

## 2019-01-15 PROCEDURE — 63600175 PHARM REV CODE 636 W HCPCS: Performed by: EMERGENCY MEDICINE

## 2019-01-15 PROCEDURE — 93010 ELECTROCARDIOGRAM REPORT: CPT | Mod: ,,, | Performed by: INTERNAL MEDICINE

## 2019-01-15 PROCEDURE — 80053 COMPREHEN METABOLIC PANEL: CPT

## 2019-01-15 PROCEDURE — 94761 N-INVAS EAR/PLS OXIMETRY MLT: CPT

## 2019-01-15 PROCEDURE — 85007 BL SMEAR W/DIFF WBC COUNT: CPT

## 2019-01-15 PROCEDURE — 85027 COMPLETE CBC AUTOMATED: CPT

## 2019-01-15 PROCEDURE — 83605 ASSAY OF LACTIC ACID: CPT

## 2019-01-15 PROCEDURE — 36600 WITHDRAWAL OF ARTERIAL BLOOD: CPT | Mod: 59

## 2019-01-15 PROCEDURE — 96361 HYDRATE IV INFUSION ADD-ON: CPT

## 2019-01-15 PROCEDURE — 82803 BLOOD GASES ANY COMBINATION: CPT

## 2019-01-15 PROCEDURE — 99900035 HC TECH TIME PER 15 MIN (STAT)

## 2019-01-15 PROCEDURE — 87040 BLOOD CULTURE FOR BACTERIA: CPT | Mod: 59

## 2019-01-15 PROCEDURE — 93005 ELECTROCARDIOGRAM TRACING: CPT

## 2019-01-15 PROCEDURE — 94760 N-INVAS EAR/PLS OXIMETRY 1: CPT

## 2019-01-15 PROCEDURE — 25000003 PHARM REV CODE 250: Performed by: EMERGENCY MEDICINE

## 2019-01-15 RX ORDER — BENZONATATE 100 MG/1
100 CAPSULE ORAL
Status: COMPLETED | OUTPATIENT
Start: 2019-01-15 | End: 2019-01-15

## 2019-01-15 RX ORDER — DEXAMETHASONE SODIUM PHOSPHATE 4 MG/ML
8 INJECTION, SOLUTION INTRA-ARTICULAR; INTRALESIONAL; INTRAMUSCULAR; INTRAVENOUS; SOFT TISSUE
Status: COMPLETED | OUTPATIENT
Start: 2019-01-15 | End: 2019-01-15

## 2019-01-15 RX ORDER — BENZONATATE 100 MG/1
100 CAPSULE ORAL 3 TIMES DAILY PRN
Qty: 20 CAPSULE | Refills: 0 | Status: SHIPPED | OUTPATIENT
Start: 2019-01-15 | End: 2019-01-25

## 2019-01-15 RX ORDER — ALBUTEROL SULFATE 90 UG/1
1-2 AEROSOL, METERED RESPIRATORY (INHALATION) EVERY 6 HOURS PRN
Qty: 1 INHALER | Refills: 0 | OUTPATIENT
Start: 2019-01-15 | End: 2019-01-31

## 2019-01-15 RX ADMIN — SODIUM CHLORIDE 1000 ML: 0.9 INJECTION, SOLUTION INTRAVENOUS at 05:01

## 2019-01-15 RX ADMIN — BENZONATATE 100 MG: 100 CAPSULE ORAL at 04:01

## 2019-01-15 RX ADMIN — DEXAMETHASONE SODIUM PHOSPHATE 8 MG: 4 INJECTION, SOLUTION INTRAMUSCULAR; INTRAVENOUS at 06:01

## 2019-01-15 NOTE — ED TRIAGE NOTES
Pt to ED with CO cold/URI symptoms Xs 1 week. Pt CO cough, SOB on exertion, malaise, and body aches. Pt reports being seen at multiple EDs, and [facility] including 2 visits to this department for the same complaint. Pt states he has been taking ibuprofen, and nyquil with no relief.

## 2019-01-15 NOTE — ED PROVIDER NOTES
"Encounter Date: 1/15/2019    SCRIBE #1 NOTE: I, Erin Garza, am scribing for, and in the presence of, Dr. Gaspar.       History     Chief Complaint   Patient presents with    Cough     "Audrey been having a bad cold for a week, I cant take it no more"     Time seen by provider: 4:24 AM    This is a 63 y.o. male who presents to the ED with complaint of cough and cold symptoms for the past week. Patient reports runny nose, nausea in the morning, chronic back and neck pain, body aches, but denies fever. Patient reports no relief with OTC decongestant medications. Patient is compliant 800 mg Motrin for chronic neck and back pain. Patient is a smoker but denies smoking in the last 2 days. Patient denies anyone sick at home.  Progression is persistent and gradually worsening.  Degree of symptoms is moderate.          The history is provided by the patient.     Review of patient's allergies indicates:  No Known Allergies  Past Medical History:   Diagnosis Date    Anxiety     Diabetes mellitus 2017    Kidney stone     Urinary tract infection      Past Surgical History:   Procedure Laterality Date    KIDNEY STONE SURGERY      NECK SURGERY      secondary to previous fall     Family History   Problem Relation Age of Onset    Hypertension Mother     Diabetes Mother     Stroke Mother     Prostate cancer Neg Hx     Kidney disease Neg Hx      Social History     Tobacco Use    Smoking status: Current Every Day Smoker     Packs/day: 1.50     Years: 40.00     Pack years: 60.00    Smokeless tobacco: Never Used   Substance Use Topics    Alcohol use: No    Drug use: Yes     Types: Oxycodone     Review of Systems   Constitutional: Negative for fever.   HENT: Positive for rhinorrhea. Negative for sore throat.    Respiratory: Positive for cough. Negative for shortness of breath.    Cardiovascular: Negative for chest pain.   Gastrointestinal: Positive for nausea.   Genitourinary: Negative for dysuria.   Musculoskeletal: Positive " "for back pain, myalgias and neck pain.   Skin: Negative for rash.   Neurological: Negative for weakness.   Hematological: Does not bruise/bleed easily.       Physical Exam     Initial Vitals [01/15/19 0416]   BP Pulse Resp Temp SpO2   123/75 (!) 111 20 97.7 °F (36.5 °C) 95 %      MAP       --         Physical Exam  Nursing note and vitals reviewed.  /75 (BP Location: Left arm, Patient Position: Sitting)   Pulse 94   Temp 97.7 °F (36.5 °C) (Oral)   Resp 14   Ht 5' 6" (1.676 m)   Wt 77.6 kg (171 lb)   SpO2 (!) 90%   BMI 27.60 kg/m²   Constitutional: AAOx3. No distress.  HENT:   Mouth/Throat: Oropharynx is clear and dry  Eyes: EOMI. No discharge. Anicteric.  Neck: Normal range of motion. Neck supple.  Cardiovascular: Mildly tachycardic rate. No murmur, no gallop and no friction rub heard.   Pulmonary/Chest: No respiratory distress. Effort normal. No wheezes, no rales, no rhonchi  Abdominal: Bowel sounds normal. Soft. No distension and no mass. There is no tenderness. There is no rebound, no guarding, no tenderness at McBurney's point and negative Pierre's sign.   Musculoskeletal: Normal range of motion.   Neurological: GCS 15. Alert and oriented to person, place, and time. No gross cranial nerve, light touch or strength deficit. Coordination normal.   Skin: Skin is warm and dry.   Psychiatric: Behavior is normal. Judgment normal.    ED Course   Procedures  Labs Reviewed   POCT GLUCOSE MONITORING CONTINUOUS     EKG Readings: (Independently Interpreted)   I independently reviewed and interpreted EKG which shows NSR, no STEMI, no ischemic changes, normal intervals         Imaging Results    None          Medical Decision Making:   Clinical Tests:   Lab Tests: Ordered and Reviewed  Radiological Study: Ordered and Reviewed            Scribe Attestation:   Scribe #1: I performed the above scribed service and the documentation accurately describes the services I performed. I attest to the accuracy of the " note.    Attending Attestation:         Attending Critical Care:   Critical Care Times:   ==============================================================  · Total Critical Care Time - exclusive of procedural time: 30 minutes.  ==============================================================  Critical care was necessary to treat or prevent imminent or life-threatening deterioration of the following conditions: respiratory failure.     Physician Attestation for Scribe:  Physician Attestation Statement for Scribe #1: I, Dr. Gaspar, reviewed documentation, as scribed by Erin Garza in my presence, and it is both accurate and complete.                 ED Course as of Bryan 15 0628   Tue Bryan 15, 2019   0502 Patient is a 63-year-old male smoker with diabetes who presents with persistent and gradually worsening cough for the last week in a improving despite regular use of decongestants.  Initially the patient was mildly tachycardic with normal pulse oximetry.  On reassessment, pt complained of chest pain and noted to have low normal pulse oximetry. Review of the patient's recent visits shows normal recent pulse oximetry, increasing heart rates over the past several days.   I independently reviewed and interpreted CXR which shows no acute process.  Given the transfer the patient's vitals, his clinical appearance, I am concerned for pneumonia and potential sepsis not evident on plain films.  [RC]   0625 I independently reviewed and interpreted labs which are notable for mild polycythemia, stable chronic thrombocytopenia, normal lactic acid, normal troponin.  ABG shows relative hypoxia, with pulse ox greater than 88%.  CT chest with no acute findings.  I offered the patient admission and had a shared decision making discussion regarding the risks and benefits of admission versus discharge with strict return precautions.  He likes discharge, stating that he has to look at an apartment this morning, states that he will return for  reassessment in 1 day, return if he has any worsening symptoms.  I discussed with patient and/or guardian/caretaker that this evaluation in the ED does not suggest any emergent or life threatening medical condition requiring admission or immediate intervention beyond what was provided in the ED. Regardless, an unremarkable evaluation in the ED does not preclude the development or presence of a serious or life threatening condition. As such, patient was instructed to return immediately for any worsening or change in current symptoms.     I had a detailed discussion with patient  and/or guardian/caretaker regarding findings, plan, return precautions, importance of medication adherence, need to follow-up with a PCP. All questions answered.     Note was created using voice recognition software. Note may have occasional typographical errors that may not have been identified and edited despite initial review prior to signing.      [RC]      ED Course User Index  [RC] Angel Gaspar MD     Clinical Impression:     1. Cough                                 Angel Gaspar MD  01/15/19 0628       Angel Gaspar MD  01/15/19 0631

## 2019-01-15 NOTE — DISCHARGE INSTRUCTIONS
Call your primary care doctor to make the first available appointment.     Keep all your medical appointments.     Take your regular medication as prescribed. Contact your primary care provider before running out of medication, or for any problems obtaining them.    Do not drive or operate heavy machinery while taking opioid or muscle relaxing medications. Examples include norco, percocet, xanax, valium, flexeril.     Overuse or long term use of pain and sedating medication may lead to addiction, dependence, organ failure, family and work problems, legal problems, accidental overdose and death.    If you do not have health insurance, you probably qualify for heavily discounted rates:  Call 1-935.212.5021 ([facility] hotline) or go to www.[employer].[place].gov    Your evaluation in the ED does not suggest any emergent or life threatening medical condition requiring admission or immediate intervention beyond that provided in the ED.   However, the signs of a serious problem sometimes take more time to appear.   RETURN TO THE ER if any of the following occur:    Weakness, dizziness, fainting, or loss of consciousness   Fever of 100.4ºF (38ºC) or higher  Any worse symptoms  Any new or concerning symptoms

## 2019-01-16 LAB — BACTERIA UR CULT: NORMAL

## 2019-01-20 LAB
BACTERIA BLD CULT: NORMAL
BACTERIA BLD CULT: NORMAL

## 2019-01-31 ENCOUNTER — HOSPITAL ENCOUNTER (EMERGENCY)
Facility: OTHER | Age: 64
Discharge: HOME | End: 2019-01-31
Attending: EMERGENCY MEDICINE
Payer: MEDICARE

## 2019-01-31 VITALS
SYSTOLIC BLOOD PRESSURE: 127 MMHG | TEMPERATURE: 98 F | HEIGHT: 67 IN | DIASTOLIC BLOOD PRESSURE: 64 MMHG | RESPIRATION RATE: 20 BRPM | OXYGEN SATURATION: 97 % | BODY MASS INDEX: 22.13 KG/M2 | HEART RATE: 92 BPM | WEIGHT: 141 LBS

## 2019-01-31 DIAGNOSIS — Z72.0 TOBACCO ABUSE: ICD-10-CM

## 2019-01-31 DIAGNOSIS — J18.9 PNEUMONIA OF RIGHT MIDDLE LOBE DUE TO INFECTIOUS ORGANISM: Primary | ICD-10-CM

## 2019-01-31 DIAGNOSIS — R06.02 SOB (SHORTNESS OF BREATH): ICD-10-CM

## 2019-01-31 PROCEDURE — 93010 ELECTROCARDIOGRAM REPORT: CPT | Mod: ,,, | Performed by: INTERNAL MEDICINE

## 2019-01-31 PROCEDURE — 93005 ELECTROCARDIOGRAM TRACING: CPT

## 2019-01-31 PROCEDURE — 93010 EKG 12-LEAD: ICD-10-PCS | Mod: ,,, | Performed by: INTERNAL MEDICINE

## 2019-01-31 PROCEDURE — 99284 EMERGENCY DEPT VISIT MOD MDM: CPT | Mod: 25

## 2019-01-31 RX ORDER — ALBUTEROL SULFATE 90 UG/1
1-2 AEROSOL, METERED RESPIRATORY (INHALATION) EVERY 6 HOURS PRN
Qty: 1 INHALER | Refills: 0 | Status: SHIPPED | OUTPATIENT
Start: 2019-01-31 | End: 2019-02-03 | Stop reason: SDUPTHER

## 2019-01-31 RX ORDER — AZITHROMYCIN 250 MG/1
TABLET, FILM COATED ORAL
Qty: 6 TABLET | Refills: 0 | Status: ON HOLD | OUTPATIENT
Start: 2019-01-31 | End: 2019-02-28

## 2019-01-31 RX ORDER — IBUPROFEN 800 MG/1
800 TABLET ORAL EVERY 6 HOURS PRN
Qty: 30 TABLET | Refills: 0 | Status: SHIPPED | OUTPATIENT
Start: 2019-01-31 | End: 2019-09-11

## 2019-01-31 NOTE — ED TRIAGE NOTES
Pt. Presents to ED today with c/o non productive cough, sob, and non radiating sternal cp x3 days, worse with coughing. Pt. Recently ran out of phenergan cough syrup, was given that 2 weeks ago for same complaint. Pt. States chest pain 8/10 at this time, denies n/v/d, fevers. Speaking full sentences, non labored breathing, bbs clear, sating 99% RA. No other acute distress noted at this time.

## 2019-01-31 NOTE — ED PROVIDER NOTES
Encounter Date: 1/31/2019    SCRIBE #1 NOTE: I, Toshianiels Shaw, am scribing for, and in the presence of, Dr. Lowery.       History     Chief Complaint   Patient presents with    Shortness of Breath     reports cough/congestion x 9 days with SOB x 2 days     Time seen by provider: 3:20 PM    This is a 63 y.o. male, with a history of DM, who presents with complaint of difficulty breathing for two days that is worsening. The patient reports taking deep breaths hurt his chest, and he wakes up in the middle of the night due to the shortness of breath. The patient also reports he has had a bad cough for about three or four weeks now that will not go away. The patient reports taking cough syrup but it did not alleviate his cough. The patient denies fever, nausea, or vomiting. The patient reports he is Type 2 diabetic, and was prescribed metformin, but he has not been taking the pills. The patient reports he checks his sugar levels and they are often normal. The patient reports he used a breathing treatment machine many years ago, but has not been using any inhalers. The patient denies history of asthma, or emphysema. The patient reports he smokes a half a pack of cigarettes everyday and has been smoking since he was 18 years old. The patient reports seeing Dr. Nunn for symptoms in the past.      The history is provided by the patient.     Review of patient's allergies indicates:  No Known Allergies  Past Medical History:   Diagnosis Date    Anxiety     Diabetes mellitus 2017    Kidney stone     Urinary tract infection      Past Surgical History:   Procedure Laterality Date    KIDNEY STONE SURGERY      NECK SURGERY      secondary to previous fall     Family History   Problem Relation Age of Onset    Hypertension Mother     Diabetes Mother     Stroke Mother     Prostate cancer Neg Hx     Kidney disease Neg Hx      Social History     Tobacco Use    Smoking status: Current Every Day Smoker     Packs/day: 1.50      Years: 40.00     Pack years: 60.00    Smokeless tobacco: Never Used   Substance Use Topics    Alcohol use: No    Drug use: Yes     Types: Oxycodone     Review of Systems   Constitutional: Negative for fever.   HENT: Negative for sore throat.    Respiratory: Positive for cough and shortness of breath.    Cardiovascular: Negative for chest pain.   Gastrointestinal: Negative for nausea and vomiting.   Genitourinary: Negative for dysuria.   Musculoskeletal: Negative for back pain.   Skin: Negative for rash.   Neurological: Negative for weakness.   Hematological: Does not bruise/bleed easily.       Physical Exam     Initial Vitals [01/31/19 1432]   BP Pulse Resp Temp SpO2   (!) 141/73 95 18 98.1 °F (36.7 °C) 95 %      MAP       --         Physical Exam    Nursing note and vitals reviewed.  Constitutional: He appears well-developed and well-nourished. He is not diaphoretic. No distress.   HENT:   Head: Normocephalic and atraumatic.   Eyes: Conjunctivae and EOM are normal.   Neck: Normal range of motion.   Cardiovascular: Normal rate, regular rhythm and normal heart sounds.   Pulmonary/Chest:   No cough during exam. Speaks in full sentences without difficulty.   Musculoskeletal: Normal range of motion.   Neurological: He is alert and oriented to person, place, and time.   Skin: Skin is warm and dry.         ED Course   Procedures  Labs Reviewed - No data to display  EKG Readings: (Independently Interpreted)   Sinus rhythm at a rate of 90 bpm. No ischemia. No arrhythmia.     ECG Results          EKG 12-lead (Final result)  Result time 01/31/19 16:24:46    Final result by Interface, Lab In [facility] (01/31/19 16:24:46)                 Narrative:    Test Reason : R06.02,    Vent. Rate : 089 BPM     Atrial Rate : 089 BPM     P-R Int : 172 ms          QRS Dur : 082 ms      QT Int : 356 ms       P-R-T Axes : 078 077 074 degrees     QTc Int : 433 ms    Normal sinus rhythm  Normal ECG    Confirmed by Omero Muñoz MD ([id number])  on 1/31/2019 4:24:39 PM    Referred By: MEGAN DWYER           Confirmed By:Omero Muñoz MD                            Imaging Results          X-Ray Chest PA And Lateral (Final result)  Result time 01/31/19 15:22:34    Final result by April Feliciano MD (01/31/19 15:22:34)                 Impression:      Abnormal chest radiograph.      Electronically signed by: April Feliciano MD  Date:    01/31/2019  Time:    15:22             Narrative:    EXAMINATION:  XR CHEST PA AND LATERAL    CLINICAL HISTORY:  Shortness of breath    TECHNIQUE:  PA and lateral views of the chest were performed.    COMPARISON:  Chest CT: 01/15/2019.    Chest radiograph: 01/15/2019.  01/13/2019.    FINDINGS:  Soft tissue opacity in the anterior inferior aspect of the middle lobe accounts for increased attenuation in that region on lateral chest radiograph.  It may account for increased attenuation overlying the medial aspect of the right lower lung zone on PA view, projected over the anterior aspect of the 6th rib at the level of the 9th rib.    I detect no other focal pulmonary disease.  Allowing for technical factors I doubt interstitial lung disease.  I detect no pleural fluid, pneumothorax, pneumomediastinum or pneumoperitoneum and no lymph node enlargement or cardiac decompensation in this 63-year-old man with calcific plaque at the level of the arch.    Hardware is present in the distal cervical spine.  Degenerative changes are present at the acromioclavicular joints.                              X-Rays:   Independently Interpreted Readings:   Chest X-Ray: Patchy infiltrate in the right base.     Medical Decision Making:   Clinical Tests:   Radiological Study: Reviewed  Medical Tests: Reviewed            Scribe Attestation:   Scribe #1: I performed the above scribed service and the documentation accurately describes the services I performed. I attest to the accuracy of the note.    Attending Attestation:           Physician  Attestation for Scribe:  Physician Attestation Statement for Scribe #1: I, Dr. Lowery, reviewed documentation, as scribed by Betty Shaw in my presence, and it is both accurate and complete.           Patient presents with persisting cough.  Denies fevers or chills.  He is a poor historian.  Does not remember when his last evaluations was for these symptoms although chart review shows only a few weeks ago.  He does continue to smoke.  Denies a history of pulmonary disease although has been prescribed inhalers and nebulizers in the past.  He is requesting a refill of Phenergan with codeine cough syrup.  States that all over-the-counter medications do not work he is declining to try them and is declining a prescription for Tessalon Perles.  A his chest x-ray shows a infiltrate at the right base.  Review of prior CTs shows no corresponding density, making interval development of mass unlikely therefore suspect development of community-acquired pneumonia.  Will start on course of azithromycin.  Again encouraged smoking cessation.  Return precautions discussed.  Encouraged follow-up with primary care, especially if symptoms persist.                   Clinical Impression:     1. Pneumonia of right middle lobe due to infectious organism    2. SOB (shortness of breath)    3. Tobacco abuse                                   Federico Lowery II, MD  01/31/19 1879

## 2019-02-02 ENCOUNTER — HOSPITAL ENCOUNTER (EMERGENCY)
Facility: OTHER | Age: 64
Discharge: HOME | End: 2019-02-02
Attending: EMERGENCY MEDICINE
Payer: MEDICARE

## 2019-02-02 VITALS
TEMPERATURE: 99 F | HEIGHT: 67 IN | OXYGEN SATURATION: 95 % | WEIGHT: 146 LBS | HEART RATE: 97 BPM | RESPIRATION RATE: 20 BRPM | DIASTOLIC BLOOD PRESSURE: 61 MMHG | BODY MASS INDEX: 22.91 KG/M2 | SYSTOLIC BLOOD PRESSURE: 109 MMHG

## 2019-02-02 DIAGNOSIS — R05.9 COUGH: ICD-10-CM

## 2019-02-02 DIAGNOSIS — J18.9 PNEUMONIA OF RIGHT LOWER LOBE DUE TO INFECTIOUS ORGANISM: Primary | ICD-10-CM

## 2019-02-02 PROCEDURE — 99284 EMERGENCY DEPT VISIT MOD MDM: CPT | Mod: 25

## 2019-02-02 RX ORDER — MOXIFLOXACIN HYDROCHLORIDE 400 MG/1
400 TABLET ORAL DAILY
Qty: 10 TABLET | Refills: 0 | OUTPATIENT
Start: 2019-02-02 | End: 2019-02-03

## 2019-02-02 RX ORDER — PROMETHAZINE HYDROCHLORIDE AND DEXTROMETHORPHAN HYDROBROMIDE 6.25; 15 MG/5ML; MG/5ML
5 SYRUP ORAL EVERY 6 HOURS PRN
Qty: 180 ML | Refills: 0 | Status: SHIPPED | OUTPATIENT
Start: 2019-02-02 | End: 2019-02-12

## 2019-02-02 NOTE — ED PROVIDER NOTES
Encounter Date: 2/2/2019    SCRIBE #1 NOTE: I, Estrellabal Brock, am scribing for, and in the presence of, Dr. Miller.       History     Chief Complaint   Patient presents with    Medication Refill     pt needs a refill on his abx or needs a new cxr to make sure there is no spot on his lung     Time seen by provider: 8:27 AM    This is a 63 y.o. male, with history of DM, who presents with persistent dry cough.  He also experiences subjective fevers at night, pleuritic chest pain, and general myalgias.  He denies SOB or vomiting.  He is requesting chest X-ray and refill on antibiotics for pneumonia.      The history is provided by the patient.     Review of patient's allergies indicates:  No Known Allergies  Past Medical History:   Diagnosis Date    Anxiety     Diabetes mellitus 2017    Kidney stone     Urinary tract infection      Past Surgical History:   Procedure Laterality Date    KIDNEY STONE SURGERY      NECK SURGERY      secondary to previous fall     Family History   Problem Relation Age of Onset    Hypertension Mother     Diabetes Mother     Stroke Mother     Prostate cancer Neg Hx     Kidney disease Neg Hx      Social History     Tobacco Use    Smoking status: Current Every Day Smoker     Packs/day: 1.50     Years: 40.00     Pack years: 60.00    Smokeless tobacco: Never Used   Substance Use Topics    Alcohol use: No    Drug use: Yes     Types: Oxycodone     Review of Systems   Constitutional: Positive for diaphoresis and fever (Subjective). Negative for chills.   HENT: Negative for congestion and sore throat.    Respiratory: Positive for cough. Negative for shortness of breath.    Cardiovascular: Positive for chest pain (Pleuritic). Negative for leg swelling.   Gastrointestinal: Negative for abdominal pain, diarrhea, nausea and vomiting.   Genitourinary: Negative for decreased urine volume, difficulty urinating, dysuria, enuresis, frequency, hematuria and urgency.   Musculoskeletal: Positive for  myalgias. Negative for back pain.   Skin: Negative for color change, pallor and rash.   Neurological: Negative for weakness and headaches.   Hematological: Negative for adenopathy. Does not bruise/bleed easily.       Physical Exam     Initial Vitals [02/02/19 0639]   BP Pulse Resp Temp SpO2   136/66 106 20 98.4 °F (36.9 °C) 96 %      MAP       --         Physical Exam    Nursing note and vitals reviewed.  Constitutional: He appears well-developed and well-nourished. He is not diaphoretic. No distress.   HENT:   Head: Normocephalic and atraumatic.   Eyes: Conjunctivae and EOM are normal. No scleral icterus.   Neck: Normal range of motion. Neck supple.   Cardiovascular: Normal rate, regular rhythm and normal heart sounds. Exam reveals no gallop and no friction rub.    No murmur heard.  Pulmonary/Chest: No respiratory distress. He has no wheezes. He has no rhonchi. He has no rales.   Decreased breath sounds in right lower lobe.   Abdominal: Soft. Bowel sounds are normal. He exhibits no distension. There is no tenderness. There is no rebound and no guarding.   Musculoskeletal: Normal range of motion. He exhibits no edema or tenderness.   Lymphadenopathy:     He has no cervical adenopathy.   Neurological: He is alert and oriented to person, place, and time.   Skin: Skin is warm and dry. No rash noted. No erythema. No pallor.   Psychiatric: He has a normal mood and affect. His behavior is normal. Judgment and thought content normal.         ED Course   Procedures  Labs Reviewed - No data to display       Imaging Results          X-Ray Chest PA And Lateral (Final result)  Result time 02/02/19 07:53:52    Final result by Natasha Alvarado MD (02/02/19 07:53:52)                 Impression:      Suspected right middle lobe pneumonia, the overall appearance of which is similar to the previous study of 01/31/2019.      Electronically signed by: Natasha Alvarado MD  Date:    02/02/2019  Time:    07:53             Narrative:     EXAMINATION:  XR CHEST PA AND LATERAL    CLINICAL HISTORY:  Cough    TECHNIQUE:  PA and lateral views of the chest were performed.    COMPARISON:  01/31/2019    FINDINGS:  Continued abnormal soft tissue opacity in the right middle lobe anteriorly the suggestive for a pneumonia in the appropriate clinical setting.  The left lung is clear.  The heart is normal in size.  Calcified atheromatous disease affects the aorta.  Age-appropriate degenerative changes affect the skeleton.                              X-Rays:   Independently Interpreted Readings:   Chest X-Ray: Persistent pneumonia. Similar to chest X-ray on 1-31-19.     Medical Decision Making:   Initial Assessment:   63 year old male smoker with frequent vists to ED. Presents with persistent cough and right lower lobe inflitrate. Pt understands needs for additional workup including blood work and IV antibiotics. Report he cannot stay. Informed pt of worsening condition. Pt reports that he will return this afternoon.  Differential Diagnosis:   Differential diagnosis includes, but is not limited to:  Bronchitis, URI, allergies, irritants, pulmonary edema, GERD, laryngitis, tracheitis, asthma, sinusitis, pneumonia, viral, COPD, medication side effect    Clinical Tests:   Radiological Study: Reviewed and Ordered  ED Management:  The patient was advised he should stay for additional treatment and workup.  He reported he could not stay and had an appointment with a .  Patient was advised of risk of worsening infection, respiratory failure or even death as a result of leaving against medical advice.  The patient has insight, understands risk and states he will return following appointment.              Scribe Attestation:   Scribe #1: I performed the above scribed service and the documentation accurately describes the services I performed. I attest to the accuracy of the note.    Attending Attestation:           Physician Attestation for Scribe:  Physician  Attestation Statement for Scribe #1: I, Dr. Miller, reviewed documentation, as scribed by Nick Brock in my presence, and it is both accurate and complete.                    Clinical Impression:     1. Pneumonia of right lower lobe due to infectious organism    2. Cough                                   Lexie Miller MD  02/03/19 1104

## 2019-02-02 NOTE — ED TRIAGE NOTES
"Pt presents for medication refill. Pt report she was taking a zpak. Pt came for eval to ensure "I don't have a spot on my lung." Pt AAOx4, RR even and unlabored, NAD.  "

## 2019-02-03 ENCOUNTER — HOSPITAL ENCOUNTER (EMERGENCY)
Facility: OTHER | Age: 64
Discharge: HOME | End: 2019-02-03
Attending: EMERGENCY MEDICINE
Payer: MEDICARE

## 2019-02-03 VITALS
SYSTOLIC BLOOD PRESSURE: 107 MMHG | WEIGHT: 141 LBS | HEIGHT: 67 IN | OXYGEN SATURATION: 97 % | BODY MASS INDEX: 22.13 KG/M2 | HEART RATE: 90 BPM | DIASTOLIC BLOOD PRESSURE: 56 MMHG | TEMPERATURE: 99 F | RESPIRATION RATE: 18 BRPM

## 2019-02-03 DIAGNOSIS — J18.9 PNEUMONIA OF RIGHT MIDDLE LOBE DUE TO INFECTIOUS ORGANISM: Primary | ICD-10-CM

## 2019-02-03 DIAGNOSIS — R07.9 CHEST PAIN: ICD-10-CM

## 2019-02-03 LAB
ALBUMIN SERPL BCP-MCNC: 3 G/DL
ALP SERPL-CCNC: 65 U/L
ALT SERPL W/O P-5'-P-CCNC: 55 U/L
ANION GAP SERPL CALC-SCNC: 9 MMOL/L
AST SERPL-CCNC: 60 U/L
BASOPHILS # BLD AUTO: 0.02 K/UL
BASOPHILS NFR BLD: 0.3 %
BILIRUB SERPL-MCNC: 1.3 MG/DL
BNP SERPL-MCNC: 34 PG/ML
BUN SERPL-MCNC: 15 MG/DL
CALCIUM SERPL-MCNC: 8.7 MG/DL
CHLORIDE SERPL-SCNC: 105 MMOL/L
CO2 SERPL-SCNC: 21 MMOL/L
CREAT SERPL-MCNC: 0.8 MG/DL
DIFFERENTIAL METHOD: ABNORMAL
EOSINOPHIL # BLD AUTO: 0 K/UL
EOSINOPHIL NFR BLD: 0.4 %
ERYTHROCYTE [DISTWIDTH] IN BLOOD BY AUTOMATED COUNT: 13.1 %
EST. GFR  (AFRICAN AMERICAN): >60 ML/MIN/1.73 M^2
EST. GFR  (NON AFRICAN AMERICAN): >60 ML/MIN/1.73 M^2
GLUCOSE SERPL-MCNC: 107 MG/DL
HCT VFR BLD AUTO: 36.6 %
HGB BLD-MCNC: 12.4 G/DL
LACTATE SERPL-SCNC: 1.2 MMOL/L
LYMPHOCYTES # BLD AUTO: 1.6 K/UL
LYMPHOCYTES NFR BLD: 21 %
MCH RBC QN AUTO: 31.9 PG
MCHC RBC AUTO-ENTMCNC: 33.9 G/DL
MCV RBC AUTO: 94 FL
MONOCYTES # BLD AUTO: 0.8 K/UL
MONOCYTES NFR BLD: 10.4 %
NEUTROPHILS # BLD AUTO: 5.2 K/UL
NEUTROPHILS NFR BLD: 67.8 %
PLATELET # BLD AUTO: 124 K/UL
PMV BLD AUTO: 10.9 FL
POTASSIUM SERPL-SCNC: 3.5 MMOL/L
PROT SERPL-MCNC: 7.3 G/DL
RBC # BLD AUTO: 3.89 M/UL
SODIUM SERPL-SCNC: 135 MMOL/L
TROPONIN I SERPL DL<=0.01 NG/ML-MCNC: 0.01 NG/ML
WBC # BLD AUTO: 7.66 K/UL

## 2019-02-03 PROCEDURE — 93010 EKG 12-LEAD: ICD-10-PCS | Mod: ,,, | Performed by: INTERNAL MEDICINE

## 2019-02-03 PROCEDURE — 94640 AIRWAY INHALATION TREATMENT: CPT

## 2019-02-03 PROCEDURE — 99284 EMERGENCY DEPT VISIT MOD MDM: CPT | Mod: 25

## 2019-02-03 PROCEDURE — 93005 ELECTROCARDIOGRAM TRACING: CPT

## 2019-02-03 PROCEDURE — 84484 ASSAY OF TROPONIN QUANT: CPT

## 2019-02-03 PROCEDURE — 25000242 PHARM REV CODE 250 ALT 637 W/ HCPCS: Performed by: EMERGENCY MEDICINE

## 2019-02-03 PROCEDURE — 80053 COMPREHEN METABOLIC PANEL: CPT

## 2019-02-03 PROCEDURE — 93010 ELECTROCARDIOGRAM REPORT: CPT | Mod: ,,, | Performed by: INTERNAL MEDICINE

## 2019-02-03 PROCEDURE — 83605 ASSAY OF LACTIC ACID: CPT

## 2019-02-03 PROCEDURE — 85025 COMPLETE CBC W/AUTO DIFF WBC: CPT

## 2019-02-03 PROCEDURE — 36415 COLL VENOUS BLD VENIPUNCTURE: CPT

## 2019-02-03 PROCEDURE — 25000003 PHARM REV CODE 250: Performed by: EMERGENCY MEDICINE

## 2019-02-03 PROCEDURE — 96360 HYDRATION IV INFUSION INIT: CPT

## 2019-02-03 PROCEDURE — 83880 ASSAY OF NATRIURETIC PEPTIDE: CPT

## 2019-02-03 RX ORDER — IPRATROPIUM BROMIDE AND ALBUTEROL SULFATE 2.5; .5 MG/3ML; MG/3ML
3 SOLUTION RESPIRATORY (INHALATION)
Status: COMPLETED | OUTPATIENT
Start: 2019-02-03 | End: 2019-02-03

## 2019-02-03 RX ORDER — DOXYCYCLINE 100 MG/1
100 CAPSULE ORAL 2 TIMES DAILY
Qty: 14 CAPSULE | Refills: 0 | Status: SHIPPED | OUTPATIENT
Start: 2019-02-03 | End: 2019-02-10

## 2019-02-03 RX ORDER — ALBUTEROL SULFATE 90 UG/1
1-2 AEROSOL, METERED RESPIRATORY (INHALATION) EVERY 6 HOURS PRN
Qty: 1 INHALER | Refills: 0 | Status: SHIPPED | OUTPATIENT
Start: 2019-02-03 | End: 2019-02-15 | Stop reason: SDUPTHER

## 2019-02-03 RX ADMIN — IPRATROPIUM BROMIDE AND ALBUTEROL SULFATE 3 ML: .5; 3 SOLUTION RESPIRATORY (INHALATION) at 05:02

## 2019-02-03 RX ADMIN — SODIUM CHLORIDE 1000 ML: 0.9 INJECTION, SOLUTION INTRAVENOUS at 05:02

## 2019-02-03 NOTE — ED PROVIDER NOTES
"Encounter Date: 2/3/2019    SCRIBE #1 NOTE: I, Rafael Dorantes, am scribing for, and in the presence of, Dr. Varner .       History     Chief Complaint   Patient presents with    More Testing     "I am here for more testing, the doctor is waiting on me. They know what I am suppose to have" pt came yesterday but could not stay     Time seen by provider: 4:28 AM    This is a 63 y.o. male, with history of DM and COPD, who presents to the ED requesting "more testing". Patient reports persistent dry cough for a few weeks. Per medical records, patient presented to [facility] ED on 1/31 with persistent cough, completed a chest X-ray that showed right base infiltrate, and was given a prescription of azithromycin. Patient states he completed the antibiotics with no relief of cough. Per medical record, he returned to the ED yesterday for persistent dry cough. He states he returned to the ED because he was "low on medications". He reports recent weight loss and decreased appetite. He has been experiencing SOB with exertion but states the symptom is at baseline. He reports constant, chest pain that is exacerbated with deep inspirations. He denies fevers, chills, headaches, dizziness, congestion, rhinorrhea, sore throat, cough, abdominal pain, N/V/D, dysuria, urinary frequency, or urinary urgency.       The history is provided by the patient and medical records.     Review of patient's allergies indicates:  No Known Allergies  Past Medical History:   Diagnosis Date    Anxiety     Diabetes mellitus 2017    Kidney stone     Urinary tract infection      Past Surgical History:   Procedure Laterality Date    KIDNEY STONE SURGERY      NECK SURGERY      secondary to previous fall     Family History   Problem Relation Age of Onset    Hypertension Mother     Diabetes Mother     Stroke Mother     Prostate cancer Neg Hx     Kidney disease Neg Hx      Social History     Tobacco Use    Smoking status: Current Every Day Smoker     " Packs/day: 1.50     Years: 40.00     Pack years: 60.00    Smokeless tobacco: Never Used   Substance Use Topics    Alcohol use: No    Drug use: Yes     Types: Oxycodone     Review of Systems   Constitutional: Negative for chills and fever.   HENT: Negative for congestion, rhinorrhea and sore throat.    Respiratory: Positive for cough and shortness of breath.    Cardiovascular: Positive for chest pain.   Gastrointestinal: Negative for abdominal pain, diarrhea, nausea and vomiting.   Genitourinary: Negative for dysuria, frequency and urgency.   Musculoskeletal: Negative for back pain.   Skin: Negative for rash.   Neurological: Negative for dizziness and headaches.   Psychiatric/Behavioral: Negative for confusion.       Physical Exam     Initial Vitals [02/03/19 0354]   BP Pulse Resp Temp SpO2   (!) 140/66 (!) 113 16 99.8 °F (37.7 °C) 95 %      MAP       --         Physical Exam    Nursing note and vitals reviewed.  Constitutional: He appears well-developed and well-nourished. No distress.   HENT:   Head: Normocephalic and atraumatic.   Mouth/Throat: Oropharynx is clear and moist.   Eyes: Conjunctivae and EOM are normal.   Neck: Normal range of motion. Neck supple.   Cardiovascular: Regular rhythm, normal heart sounds and intact distal pulses. Tachycardia present.    Slight tachycardia.    Pulmonary/Chest: No respiratory distress. He has no wheezes. He has no rhonchi. He has no rales.   Decreased breath sounds diffusely.    Musculoskeletal: Normal range of motion. He exhibits no edema.   No peripheral edema.    Neurological: He is alert and oriented to person, place, and time. He has normal strength. No cranial nerve deficit or sensory deficit.   Skin: Skin is warm and dry.   Psychiatric: He has a normal mood and affect. His behavior is normal. Judgment and thought content normal.         ED Course   Procedures  Labs Reviewed   CBC W/ AUTO DIFFERENTIAL - Abnormal; Notable for the following components:       Result  Value    RBC 3.89 (*)     Hemoglobin 12.4 (*)     Hematocrit 36.6 (*)     MCH 31.9 (*)     Platelets 124 (*)     All other components within normal limits   COMPREHENSIVE METABOLIC PANEL - Abnormal; Notable for the following components:    Sodium 135 (*)     CO2 21 (*)     Albumin 3.0 (*)     Total Bilirubin 1.3 (*)     AST 60 (*)     ALT 55 (*)     All other components within normal limits   B-TYPE NATRIURETIC PEPTIDE   TROPONIN I   LACTIC ACID, PLASMA     EKG Readings: (Independently Interpreted)   Initial Reading: No STEMI.   NSR at a rate of 95. No acute ischemia. No change from previous.        Imaging Results    None          Medical Decision Making:   Initial Assessment:       63-year-old male with history of COPD, DM presents for reassessment of pneumonia.  He has had a dry cough for weeks, initially seen here 0115 when extensive workup done including CT chest with no evidence of pneumonia.  He was seen again he here 1/31, 1 x-ray did show concern for right middle lobe infiltrate and he was given Rx for Z-[place].  He returned yesterday for refill on antibiotics since she finished a Z-[place] early; he states he took 4 pills on the 1st day and to on the 2nd day, and is now done.  He reports no change in persistent cough and pleuritic chest pain. BARBA and intermittent wheezing is unchanged from baseline, and no fevers or other complaints except slight decrease in appetite.   On arrival patient with temp 99.8° and slight tachycardia, but normal BP and O2 sat in 90s on room air.      Given low-grade temp and tachycardia on arrival, sepsis workup done with normal lactate and no elevated WBC.  Trop also normal with no sign of CHF or other concerning findings.  No indication for repeat x-ray since patient symptoms have not worsened and I would not expect right middle lobe infiltrate to clear for another few weeks.  Since he took his Z-[place] inappropriately and finished in 2 days, he was given Rx for Avelox yesterday but  unable to fill it due to cost.  Will Rx doxycycline b.i.d. and patient extensively counseled on b.i.d. Dosing.  No active wheezing or sign of COPD exacerbation, so will not Rx prednisone at this time.  On reassessment tachycardia resolved and he is ambulatory in the ED without SOB or other complaints. Stable for discharge with PCP follow-up and return precautions for any worsening cough, fevers, SOB; patient is comfortable with discharge plan and understands ED return precautions      Clinical Tests:   Lab Tests: Ordered and Reviewed  Medical Tests: Ordered and Reviewed            Scribe Attestation:   Scribe #1: I performed the above scribed service and the documentation accurately describes the services I performed. I attest to the accuracy of the note.    Attending Attestation:           Physician Attestation for Scribe:  Physician Attestation Statement for Scribe #1: I, Dr. Varner, reviewed documentation, as scribed by Rafael Dorantes  in my presence, and it is both accurate and complete.                    Clinical Impression:     1. Pneumonia of right middle lobe due to infectious organism    2. Chest pain                                   Mars Varner MD  02/05/19 4343     3.54

## 2019-02-03 NOTE — ED NOTES
Pt sitting up in bed, A & O x 3 w/out additional complaint. Pt denies change in status, no nasal flaring and no use of accessory muscles. Respirations are even and unlabored. Skin is warm, dry and pink. VS. Will continue to monitor closely.

## 2019-02-03 NOTE — ED NOTES
Pt states he came to ER to have testing done. Pt was in the ER yesterday for SOB, but couldn't stay because he had to go to work, but he told Dr. Miller he would come back this AM for testing.  Pt is A & O x 3, continues to c/o SOB x 3 days, says he was put on abx, but they didn't work. No use of accessory muscles and no nasal flaring. Pt states his chest hurts when he breathes. Skin is warm, dry and pink. VS. CROW x 3mm. BBS- rhonchi to the lower lobes bilat. Abd- SNT. PSM x 4 exts. Pt is connected to the pulse ox and B/P cuff. Bed is locked and in the low position w/ the side rails up and locked for pt safety. Call bell @ the BS. Will continue to monitor closely.

## 2019-02-07 ENCOUNTER — OFFICE VISIT (OUTPATIENT)
Dept: URGENT CARE | Facility: CLINIC | Age: 64
End: 2019-02-07
Payer: MEDICARE

## 2019-02-07 VITALS
DIASTOLIC BLOOD PRESSURE: 66 MMHG | TEMPERATURE: 98 F | WEIGHT: 141 LBS | RESPIRATION RATE: 18 BRPM | OXYGEN SATURATION: 97 % | BODY MASS INDEX: 22.13 KG/M2 | HEIGHT: 67 IN | SYSTOLIC BLOOD PRESSURE: 110 MMHG | HEART RATE: 82 BPM

## 2019-02-07 DIAGNOSIS — J18.9 PNEUMONIA OF BOTH LUNGS DUE TO INFECTIOUS ORGANISM, UNSPECIFIED PART OF LUNG: Primary | ICD-10-CM

## 2019-02-07 DIAGNOSIS — J44.0 COPD WITH ACUTE LOWER RESPIRATORY INFECTION: ICD-10-CM

## 2019-02-07 PROCEDURE — 99214 PR OFFICE/OUTPT VISIT, EST, LEVL IV, 30-39 MIN: ICD-10-PCS | Mod: 25,S$GLB,, | Performed by: SURGERY

## 2019-02-07 PROCEDURE — 99214 OFFICE O/P EST MOD 30 MIN: CPT | Mod: 25,S$GLB,, | Performed by: SURGERY

## 2019-02-07 PROCEDURE — 96372 THER/PROPH/DIAG INJ SC/IM: CPT | Mod: S$GLB,,, | Performed by: SURGERY

## 2019-02-07 PROCEDURE — 96372 PR INJECTION,THERAP/PROPH/DIAG2ST, IM OR SUBCUT: ICD-10-PCS | Mod: S$GLB,,, | Performed by: SURGERY

## 2019-02-07 RX ORDER — FLUTICASONE FUROATE AND VILANTEROL 200; 25 UG/1; UG/1
1 POWDER RESPIRATORY (INHALATION) DAILY
Qty: 60 EACH | Refills: 0 | Status: SHIPPED | OUTPATIENT
Start: 2019-02-07 | End: 2019-02-15 | Stop reason: SDUPTHER

## 2019-02-07 RX ORDER — BENZONATATE 200 MG/1
200 CAPSULE ORAL 3 TIMES DAILY PRN
Qty: 21 CAPSULE | Refills: 0 | Status: SHIPPED | OUTPATIENT
Start: 2019-02-07 | End: 2019-02-14

## 2019-02-07 RX ORDER — DOXYCYCLINE 100 MG/1
100 CAPSULE ORAL 2 TIMES DAILY
Qty: 14 CAPSULE | Refills: 0 | Status: SHIPPED | OUTPATIENT
Start: 2019-02-07 | End: 2019-02-14

## 2019-02-07 RX ORDER — CEFDINIR 300 MG/1
300 CAPSULE ORAL 2 TIMES DAILY
Qty: 20 CAPSULE | Refills: 0 | Status: SHIPPED | OUTPATIENT
Start: 2019-02-07 | End: 2019-02-17

## 2019-02-07 RX ORDER — CEFTRIAXONE 1 G/1
1 INJECTION, POWDER, FOR SOLUTION INTRAMUSCULAR; INTRAVENOUS ONCE
Status: COMPLETED | OUTPATIENT
Start: 2019-02-07 | End: 2019-02-07

## 2019-02-07 RX ORDER — METHYLPREDNISOLONE 4 MG/1
TABLET ORAL
Qty: 1 PACKAGE | Refills: 0 | Status: ON HOLD | OUTPATIENT
Start: 2019-02-07 | End: 2019-02-28

## 2019-02-07 RX ORDER — IPRATROPIUM BROMIDE 42 UG/1
2 SPRAY, METERED NASAL 4 TIMES DAILY
Qty: 15 ML | Refills: 0 | Status: SHIPPED | OUTPATIENT
Start: 2019-02-07 | End: 2019-02-15 | Stop reason: SDUPTHER

## 2019-02-07 RX ADMIN — CEFTRIAXONE 1 G: 1 INJECTION, POWDER, FOR SOLUTION INTRAMUSCULAR; INTRAVENOUS at 04:02

## 2019-02-07 NOTE — PATIENT INSTRUCTIONS
Pneumonia (Adult)  Pneumonia is an infection deep within the lungs. It is in the small air sacs (alveoli). Pneumonia may be caused by a virus or bacteria. Pneumonia caused by bacteria is usually treated with an antibiotic. Severe cases may need to be treated in the hospital. Milder cases can be treated at home. Symptoms usually start to get better during the first 2 days of treatment.    Home care  Follow these guidelines when caring for yourself at home:  · Rest at home for the first 2 to 3 days, or until you feel stronger. Dont let yourself get overly tired when you go back to your activities.  · Stay away from cigarette smoke - yours or other peoples.  · You may use acetaminophen or ibuprofen to control fever or pain, unless another medicine was prescribed. If you have chronic liver or kidney disease, talk with your healthcare provider before using these medicines. Also talk with your provider if youve had a stomach ulcer or gastrointestinal bleeding. Dont give aspirin to anyone younger than 18 years of age who is ill with a fever. It may cause severe liver damage.  · Your appetite may be poor, so a light diet is fine.  · Drink 6 to 8 glasses of fluids every day to make sure you are getting enough fluids. Beverages can include water, sport drinks, sodas without caffeine, juices, tea, or soup. Fluids will help loosen secretions in the lung. This will make it easier for you to cough up the phlegm (sputum). If you also have heart or kidney disease, check with your healthcare provider before you drink extra fluids.  · Take antibiotic medicine prescribed until it is all gone, even if you are feeling better after a few days.  Follow-up care  Follow up with your healthcare provider in the next 2 to 3 days, or as advised. This is to be sure the medicine is helping you get better.  If you are 65 or older, you should get a pneumococcal vaccine and a yearly flu (influenza) shot. You should also get these vaccines if  you have chronic lung disease like asthma, emphysema, or COPD. Recently, a second type of pneumonia vaccine has become available for everyone over 65 years old. This is in addition to the previous vaccine. Ask your provider about this.  When to seek medical advice  Call your healthcare provider right away if any of these occur:  · You dont get better within the first 48 hours of treatment  · Shortness of breath gets worse  · Rapid breathing (more than 25 breaths per minute)  · Coughing up blood  · Chest pain gets worse with breathing  · Fever of 100.4°F (38°C) or higher that doesnt get better with fever medicine  · Weakness, dizziness, or fainting that gets worse  · Thirst or dry mouth that gets worse  · Sinus pain, headache, or a stiff neck  · Chest pain not caused by coughing  Date Last Reviewed: 1/1/2017  © 5110-9367 [employer]. [address] [place] [address]. All rights reserved. This information is not intended as a substitute for professional medical care. Always follow your healthcare professional's instructions.

## 2019-02-07 NOTE — PROGRESS NOTES
"Subjective:       Patient ID: Josiah Richter is a 63 y.o. male.    Vitals:  height is 5' 7" (1.702 m) and weight is 64 kg (141 lb). His oral temperature is 97.6 °F (36.4 °C). His blood pressure is 110/66 and his pulse is 82. His respiration is 18 and oxygen saturation is 97%.     Chief Complaint: Lottie Rahman complains of a persistent cough. He states he was recently treated for pneumonia and is being followed for abnormal findings on his lungs. He also states it hurts when he breathes. He is also extremely fatigued      Cough   This is a new problem. The current episode started 1 to 4 weeks ago. The problem has been gradually worsening. The problem occurs constantly. The cough is non-productive. Associated symptoms include chest pain, shortness of breath and wheezing. Pertinent negatives include no chills, ear congestion, ear pain, eye redness, fever, heartburn, hemoptysis, myalgias, nasal congestion, postnasal drip, rash, rhinorrhea, sore throat, sweats or weight loss. Nothing aggravates the symptoms. Treatments tried: albuterol, antibiotics. The treatment provided no relief. His past medical history is significant for pneumonia.       Constitution: Positive for fatigue. Negative for chills, sweating and fever.   HENT: Negative for ear pain, congestion, postnasal drip, sinus pain, sinus pressure, sore throat and voice change.    Neck: Negative for painful lymph nodes.   Cardiovascular: Positive for chest pain.   Eyes: Negative for eye redness.   Respiratory: Positive for cough, shortness of breath and wheezing. Negative for chest tightness, sputum production, bloody sputum, COPD, stridor and asthma.    Gastrointestinal: Negative for nausea, vomiting and heartburn.   Musculoskeletal: Negative for muscle ache.   Skin: Negative for rash.   Allergic/Immunologic: Negative for seasonal allergies and asthma.   Hematologic/Lymphatic: Negative for swollen lymph nodes.       Objective:      Physical Exam "   Constitutional: He is oriented to person, place, and time. He appears well-developed and well-nourished. He is cooperative.  Non-toxic appearance. He does not appear ill. No distress.   Ill appearing thin white male older appearance than stated age   HENT:   Head: Normocephalic and atraumatic.   Right Ear: Hearing, tympanic membrane, external ear and ear canal normal.   Left Ear: Hearing, tympanic membrane, external ear and ear canal normal.   Nose: Mucosal edema and rhinorrhea present. No nasal deformity. No epistaxis. Right sinus exhibits no maxillary sinus tenderness and no frontal sinus tenderness. Left sinus exhibits no maxillary sinus tenderness and no frontal sinus tenderness.   Mouth/Throat: Uvula is midline, oropharynx is clear and moist and mucous membranes are normal. No trismus in the jaw. Normal dentition. No uvula swelling. No posterior oropharyngeal erythema.   Eyes: Conjunctivae and lids are normal. No scleral icterus.   Sclera clear bilat   Neck: Trachea normal, full passive range of motion without pain and phonation normal. Neck supple.   Cardiovascular: Normal rate, regular rhythm, normal heart sounds, intact distal pulses and normal pulses.   Pulmonary/Chest: Effort normal. No respiratory distress. He has wheezes.   Frequent cough throughout exam.  Occasional wheezing no rhonchi.   Abdominal: Soft. Normal appearance and bowel sounds are normal. He exhibits no distension. There is no tenderness.   Musculoskeletal: Normal range of motion. He exhibits no edema or deformity.   Neurological: He is alert and oriented to person, place, and time. He exhibits normal muscle tone. Coordination normal.   Skin: Skin is warm, dry and intact. He is not diaphoretic. No pallor.   Psychiatric: He has a normal mood and affect. His speech is normal and behavior is normal. Judgment and thought content normal. Cognition and memory are normal.   Nursing note and vitals reviewed.      Assessment:       1. Pneumonia of  both lungs due to infectious organism, unspecified part of lung    2. COPD with acute lower respiratory infection        Plan:         Pneumonia of both lungs due to infectious organism, unspecified part of lung  -     cefTRIAXone injection 1 g  -     cefdinir (OMNICEF) 300 MG capsule; Take 1 capsule (300 mg total) by mouth 2 (two) times daily. for 10 days  Dispense: 20 capsule; Refill: 0  -     doxycycline (VIBRAMYCIN) 100 MG Cap; Take 1 capsule (100 mg total) by mouth 2 (two) times daily. for 7 days  Dispense: 14 capsule; Refill: 0  -     fluticasone-vilanterol (BREO) 200-25 mcg/dose DsDv diskus inhaler; Inhale 1 puff into the lungs once daily. Controller  Dispense: 60 each; Refill: 0  -     benzonatate (TESSALON) 200 MG capsule; Take 1 capsule (200 mg total) by mouth 3 (three) times daily as needed for Cough.  Dispense: 21 capsule; Refill: 0  -     ipratropium (ATROVENT) 42 mcg (0.06 %) nasal spray; 2 sprays by Nasal route 4 (four) times daily.  Dispense: 15 mL; Refill: 0  -     oxymetazoline 0.05 % Mist; 2 sprays by Nasal route nightly as needed.  Dispense: 30 mL; Refill: 0  -     methylPREDNISolone (MEDROL DOSEPACK) 4 mg tablet; use as directed  Dispense: 1 Package; Refill: 0    COPD with acute lower respiratory infection     Old history CT scan and chest x-ray reviewed.  The patient is still symptomatic and coughing and slightly improved the reports with the doxycycline although he is finishing it prematurely.  The plan is to go ahead with double coverage using a 3rd generation cephalosporin in continuing doxycycline.  In addition long-acting inhaler as well as a steroid pack.    Patient Instructions     Pneumonia (Adult)  Pneumonia is an infection deep within the lungs. It is in the small air sacs (alveoli). Pneumonia may be caused by a virus or bacteria. Pneumonia caused by bacteria is usually treated with an antibiotic. Severe cases may need to be treated in the hospital. Milder cases can be treated at  home. Symptoms usually start to get better during the first 2 days of treatment.    Home care  Follow these guidelines when caring for yourself at home:  · Rest at home for the first 2 to 3 days, or until you feel stronger. Dont let yourself get overly tired when you go back to your activities.  · Stay away from cigarette smoke - yours or other peoples.  · You may use acetaminophen or ibuprofen to control fever or pain, unless another medicine was prescribed. If you have chronic liver or kidney disease, talk with your healthcare provider before using these medicines. Also talk with your provider if youve had a stomach ulcer or gastrointestinal bleeding. Dont give aspirin to anyone younger than 18 years of age who is ill with a fever. It may cause severe liver damage.  · Your appetite may be poor, so a light diet is fine.  · Drink 6 to 8 glasses of fluids every day to make sure you are getting enough fluids. Beverages can include water, sport drinks, sodas without caffeine, juices, tea, or soup. Fluids will help loosen secretions in the lung. This will make it easier for you to cough up the phlegm (sputum). If you also have heart or kidney disease, check with your healthcare provider before you drink extra fluids.  · Take antibiotic medicine prescribed until it is all gone, even if you are feeling better after a few days.  Follow-up care  Follow up with your healthcare provider in the next 2 to 3 days, or as advised. This is to be sure the medicine is helping you get better.  If you are 65 or older, you should get a pneumococcal vaccine and a yearly flu (influenza) shot. You should also get these vaccines if you have chronic lung disease like asthma, emphysema, or COPD. Recently, a second type of pneumonia vaccine has become available for everyone over 65 years old. This is in addition to the previous vaccine. Ask your provider about this.  When to seek medical advice  Call your healthcare provider right away if  any of these occur:  · You dont get better within the first 48 hours of treatment  · Shortness of breath gets worse  · Rapid breathing (more than 25 breaths per minute)  · Coughing up blood  · Chest pain gets worse with breathing  · Fever of 100.4°F (38°C) or higher that doesnt get better with fever medicine  · Weakness, dizziness, or fainting that gets worse  · Thirst or dry mouth that gets worse  · Sinus pain, headache, or a stiff neck  · Chest pain not caused by coughing  Date Last Reviewed: 1/1/2017  © 7228-3120 [employer]. [address] [place] [address]. All rights reserved. This information is not intended as a substitute for professional medical care. Always follow your healthcare professional's instructions.

## 2019-02-08 ENCOUNTER — TELEPHONE (OUTPATIENT)
Dept: ORTHOPEDICS | Facility: CLINIC | Age: 64
End: 2019-02-08

## 2019-02-08 NOTE — TELEPHONE ENCOUNTER
"Ortho Telephone Triage Message  11:39  Patient C/O: L hip pain and requesting Ortho appt. Pt denies prior L hip pain or being seen/treated for same. Noted pt was seen in ED and UC within past week  for pneumonia and AVS directed pt to see Dr. Lynsey Bradford/[facility] within 3 days. Pt confirms that he has not followed up with Dr. Bradford.  Pt states that he has "trouble remembering things" and is running out of antibiotics.  HX: Pneumonia 2/3/19  Triage Advice: Advised that pt be seen by Dr. Bradford for the pneumonia first and that she may refer him, separately, to Ortho for his L hip complaint. Advised pt that Ortho Triage will contact Dr. Vega's office to notify of same.   Resolution: Pt states diogenes Pop/Dr. Vega's office notified and is to call pt to assist with appt today.  "

## 2019-02-08 NOTE — TELEPHONE ENCOUNTER
----- Message from Jacquelin Meadwos LPN sent at 2/8/2019  9:41 AM CST -----  Contact: Self/ [phone]      ----- Message -----  From: Bronson Ortega  Sent: 2/8/2019   9:12 AM  To: Robbie HERNANDEZ Staff    Patient would like a call back to speak with a MA or nurse. Patient would like to make a appt for his left hip.

## 2019-02-15 ENCOUNTER — HOSPITAL ENCOUNTER (EMERGENCY)
Facility: OTHER | Age: 64
Discharge: HOME | End: 2019-02-15
Attending: EMERGENCY MEDICINE
Payer: MEDICARE

## 2019-02-15 VITALS
WEIGHT: 151 LBS | SYSTOLIC BLOOD PRESSURE: 128 MMHG | OXYGEN SATURATION: 95 % | BODY MASS INDEX: 23.7 KG/M2 | DIASTOLIC BLOOD PRESSURE: 66 MMHG | TEMPERATURE: 98 F | RESPIRATION RATE: 16 BRPM | HEART RATE: 74 BPM | HEIGHT: 67 IN

## 2019-02-15 DIAGNOSIS — J18.9 PNEUMONIA OF BOTH LUNGS DUE TO INFECTIOUS ORGANISM, UNSPECIFIED PART OF LUNG: ICD-10-CM

## 2019-02-15 DIAGNOSIS — Z76.0 MEDICATION REFILL: Primary | ICD-10-CM

## 2019-02-15 DIAGNOSIS — R91.8 ABNORMAL X-RAY OF LUNG: ICD-10-CM

## 2019-02-15 DIAGNOSIS — R05.9 COUGH: ICD-10-CM

## 2019-02-15 PROCEDURE — 99283 EMERGENCY DEPT VISIT LOW MDM: CPT

## 2019-02-15 RX ORDER — FLUTICASONE FUROATE AND VILANTEROL 200; 25 UG/1; UG/1
1 POWDER RESPIRATORY (INHALATION) DAILY
Qty: 60 EACH | Refills: 3 | Status: ON HOLD | OUTPATIENT
Start: 2019-02-15 | End: 2019-02-28 | Stop reason: SDUPTHER

## 2019-02-15 RX ORDER — ALBUTEROL SULFATE 90 UG/1
1-2 AEROSOL, METERED RESPIRATORY (INHALATION) EVERY 6 HOURS PRN
Qty: 1 INHALER | Refills: 3 | Status: ON HOLD | OUTPATIENT
Start: 2019-02-15 | End: 2019-02-28 | Stop reason: SDUPTHER

## 2019-02-15 RX ORDER — FLUTICASONE FUROATE AND VILANTEROL 200; 25 UG/1; UG/1
1 POWDER RESPIRATORY (INHALATION) DAILY
Qty: 60 EACH | Refills: 3 | Status: SHIPPED | OUTPATIENT
Start: 2019-02-15 | End: 2019-02-15 | Stop reason: SDUPTHER

## 2019-02-15 RX ORDER — ALBUTEROL SULFATE 90 UG/1
1-2 AEROSOL, METERED RESPIRATORY (INHALATION) EVERY 6 HOURS PRN
Qty: 1 INHALER | Refills: 3 | Status: SHIPPED | OUTPATIENT
Start: 2019-02-15 | End: 2019-02-15 | Stop reason: SDUPTHER

## 2019-02-15 RX ORDER — IPRATROPIUM BROMIDE 42 UG/1
2 SPRAY, METERED NASAL 4 TIMES DAILY
Qty: 15 ML | Refills: 3 | Status: SHIPPED | OUTPATIENT
Start: 2019-02-15 | End: 2019-02-15 | Stop reason: SDUPTHER

## 2019-02-15 RX ORDER — IPRATROPIUM BROMIDE 42 UG/1
2 SPRAY, METERED NASAL 4 TIMES DAILY
Qty: 15 ML | Refills: 3 | Status: SHIPPED | OUTPATIENT
Start: 2019-02-15

## 2019-02-15 NOTE — ED PROVIDER NOTES
"Encounter Date: 2/15/2019    SCRIBE #1 NOTE: I, Lorenameño Lazaro, am scribing for, and in the presence of, Dr. Pruitt.       History     Chief Complaint   Patient presents with    Follow up Xray     Pt states he is here for a follow up X ray from weeks ago, "they saw something on my xray and told me to get another one in 3 weeks"     Time seen by provider: 5:20 AM    This is a 63 y.o. male who presents for follow up X-ray. He states he had blood work and chest X-ray three weeks ago, and was told to return for repeat X-ray. He is unsure which doctor told him this. He also requests albuterol pump refill. He is on antibiotics and has 6 or 7 tablets left. He has no complaints at this time. He was smoking two PPD and is now down to 0.25 PPD.      The history is provided by the patient.     Review of patient's allergies indicates:  No Known Allergies  Past Medical History:   Diagnosis Date    Anxiety     Diabetes mellitus 2017    Diabetes mellitus, type 2     Kidney stone     Urinary tract infection      Past Surgical History:   Procedure Laterality Date    KIDNEY STONE SURGERY      NECK SURGERY      secondary to previous fall     Family History   Problem Relation Age of Onset    Hypertension Mother     Diabetes Mother     Stroke Mother     Prostate cancer Neg Hx     Kidney disease Neg Hx      Social History     Tobacco Use    Smoking status: Current Every Day Smoker     Packs/day: 1.50     Years: 40.00     Pack years: 60.00    Smokeless tobacco: Never Used   Substance Use Topics    Alcohol use: No    Drug use: Yes     Types: Oxycodone     Review of Systems   Constitutional: Negative for chills.   HENT: Negative for voice change.    Eyes: Negative for visual disturbance.   Respiratory: Negative for chest tightness.    Cardiovascular: Negative for chest pain.   Gastrointestinal: Negative for constipation.   Endocrine: Negative for heat intolerance.   Genitourinary: Negative for hematuria.   Musculoskeletal: " Negative for back pain.   Skin: Negative for wound.   Neurological: Negative for dizziness.   Hematological: Does not bruise/bleed easily.   Psychiatric/Behavioral: Negative for behavioral problems.   All other systems reviewed and are negative.      Physical Exam     Initial Vitals [02/15/19 0409]   BP Pulse Resp Temp SpO2   124/61 80 16 97.8 °F (36.6 °C) 96 %      MAP       --         Physical Exam    Nursing note and vitals reviewed.  Constitutional: He appears well-developed and well-nourished. He is not diaphoretic. No distress.   HENT:   Head: Normocephalic and atraumatic.   Eyes: EOM are normal. No scleral icterus.   Neck: Normal range of motion. Neck supple.   Cardiovascular: Normal rate, regular rhythm and normal heart sounds. Exam reveals no gallop and no friction rub.    No murmur heard.  Pulmonary/Chest: Breath sounds normal. No respiratory distress. He has no wheezes. He has no rhonchi. He has no rales.   Abdominal: Soft. He exhibits no distension and no mass. There is no tenderness. There is no rebound and no guarding.   Musculoskeletal: Normal range of motion. He exhibits no edema or tenderness.   Neurological: He is alert and oriented to person, place, and time.   Skin: Skin is warm and dry. Capillary refill takes less than 2 seconds.         ED Course   Procedures  Labs Reviewed - No data to display       Imaging Results          X-Ray Chest PA And Lateral (Final result)     Abnormal  Result time 02/15/19 04:34:06    Final result by Sommer Foster MD (02/15/19 04:34:06)                 Impression:      Findings concerning for right middle lobe pneumonia, similar to most recent chest radiograph of 02/02/2019.  Following appropriate treatment, recommend repeat chest radiograph after interval of 4 to 6 weeks as the radiographic resolution of pneumonia lags behind clinical resolution.  If the patient does not have convincing evidence of pneumonia, consider further evaluation with noncontrast chest  CT.    This report was flagged in Epic as abnormal.      Electronically signed by: Sommer Foster MD  Date:    02/15/2019  Time:    04:34             Narrative:    EXAMINATION:  XR CHEST PA AND LATERAL    CLINICAL HISTORY:  Cough    TECHNIQUE:  PA and lateral views of the chest were performed.    COMPARISON:  02/02/2019    FINDINGS:  The cardiomediastinal silhouette is within normal limits.  There is atherosclerosis of the thoracic aorta.  The visualized airway is unremarkable.  The lungs are symmetrically expanded with persistent coarse interstitial attenuation.  There is a persistent region of abnormal opacity involving the right middle lobe concerning for underlying infectious process such as pneumonia/aspiration in the appropriate clinical setting.  The left lung is essentially clear.  No significant volume of pleural fluid or pneumothorax.  Visualized osseous structures demonstrate postoperative change of the cervical spine as well as degenerative change.                              X-Rays:   Independently Interpreted Readings:   Chest X-Ray: Interstitial changes.     Medical Decision Making:   Differential Diagnosis:   Pneumonia, cancer, COPD exacerbation  Independently Interpreted Test(s):   I have ordered and independently interpreted X-rays - see prior notes.  Clinical Tests:   Radiological Study: Ordered and Reviewed  ED Management:  This is an emergent evaluation of a 63-year-old  male past medical history of COPD presenting with request of repeat chest x-ray after having abnormal.  Physical exam he is nontoxic afebrile and not hypoxic on room air.  Patient is aware he had a possible lung mass and wanted to get it checked which is grossly unchanged upon repeat x-ray today.  Patient informed he needs to follow up with primary care and possibly get a CT scan for further evaluation of lung mass. His COPD medications have been refilled and he is discharged to self-care.            Scribe  Attestation:   Scribe #1: I performed the above scribed service and the documentation accurately describes the services I performed. I attest to the accuracy of the note.    Attending Attestation:           Physician Attestation for Scribe:  Physician Attestation Statement for Scribe #1: I, Dr. Pruitt, reviewed documentation, as scribed by Lorena Lazaro in my presence, and it is both accurate and complete.                    Clinical Impression:      1. Medication refill  2. Cough  3. Abnormal CXR                         Ayana Pruitt MD  02/20/19 0943       Ayana Pruitt MD  02/20/19 0945

## 2019-02-15 NOTE — ED NOTES
Pt standing in the middle of the hallway, pacing up and down julien. Pt refuses to stay in his room

## 2019-02-15 NOTE — DISCHARGE INSTRUCTIONS
Narrative     EXAMINATION:  XR CHEST PA AND LATERAL    CLINICAL HISTORY:  Cough    TECHNIQUE:  PA and lateral views of the chest were performed.    COMPARISON:  02/02/2019    FINDINGS:  The cardiomediastinal silhouette is within normal limits.  There is atherosclerosis of the thoracic aorta.  The visualized airway is unremarkable.  The lungs are symmetrically expanded with persistent coarse interstitial attenuation.  There is a persistent region of abnormal opacity involving the right middle lobe concerning for underlying infectious process such as pneumonia/aspiration in the appropriate clinical setting.  The left lung is essentially clear.  No significant volume of pleural fluid or pneumothorax.  Visualized osseous structures demonstrate postoperative change of the cervical spine as well as degenerative change.      Impression       Findings concerning for right middle lobe pneumonia, similar to most recent chest radiograph of 02/02/2019.  Following appropriate treatment, recommend repeat chest radiograph after interval of 4 to 6 weeks as the radiographic resolution of pneumonia lags behind clinical resolution.  If the patient does not have convincing evidence of pneumonia, consider further evaluation with noncontrast chest CT.    This report was flagged in Epic as abnormal.      Electronically signed by: Sommer Foster MD  Date: 02/15/2019  Time: 04:34

## 2019-02-15 NOTE — ED NOTES
"Pt comes up to the desk looking for the Dr. 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  "

## 2019-02-25 ENCOUNTER — HOSPITAL ENCOUNTER (INPATIENT)
Facility: OTHER | Age: 64
LOS: 3 days | Discharge: HOME | DRG: 194 | End: 2019-02-28
Attending: EMERGENCY MEDICINE | Admitting: EMERGENCY MEDICINE
Payer: MEDICARE

## 2019-02-25 ENCOUNTER — OFFICE VISIT (OUTPATIENT)
Dept: URGENT CARE | Facility: CLINIC | Age: 64
End: 2019-02-25
Payer: MEDICARE

## 2019-02-25 VITALS
TEMPERATURE: 98 F | RESPIRATION RATE: 16 BRPM | WEIGHT: 151 LBS | HEIGHT: 67 IN | HEART RATE: 87 BPM | DIASTOLIC BLOOD PRESSURE: 64 MMHG | SYSTOLIC BLOOD PRESSURE: 103 MMHG | OXYGEN SATURATION: 98 % | BODY MASS INDEX: 23.7 KG/M2

## 2019-02-25 DIAGNOSIS — J18.9 PNEUMONIA OF RIGHT MIDDLE LOBE DUE TO INFECTIOUS ORGANISM: Primary | ICD-10-CM

## 2019-02-25 DIAGNOSIS — R06.02 SOB (SHORTNESS OF BREATH): ICD-10-CM

## 2019-02-25 DIAGNOSIS — R05.9 COUGH: ICD-10-CM

## 2019-02-25 DIAGNOSIS — J18.9 PNEUMONIA: ICD-10-CM

## 2019-02-25 DIAGNOSIS — J18.9 PNEUMONIA OF BOTH LUNGS DUE TO INFECTIOUS ORGANISM, UNSPECIFIED PART OF LUNG: ICD-10-CM

## 2019-02-25 DIAGNOSIS — R06.2 WHEEZING: ICD-10-CM

## 2019-02-25 PROBLEM — E44.0 MALNUTRITION OF MODERATE DEGREE: Chronic | Status: ACTIVE | Noted: 2019-02-25

## 2019-02-25 PROBLEM — J44.9 COPD (CHRONIC OBSTRUCTIVE PULMONARY DISEASE): Chronic | Status: ACTIVE | Noted: 2019-02-25

## 2019-02-25 PROBLEM — Z72.0 TOBACCO ABUSE: Chronic | Status: ACTIVE | Noted: 2019-02-25

## 2019-02-25 PROBLEM — J43.2 CENTRILOBULAR EMPHYSEMA: Chronic | Status: ACTIVE | Noted: 2019-02-25

## 2019-02-25 PROBLEM — J44.9 COPD (CHRONIC OBSTRUCTIVE PULMONARY DISEASE): Status: ACTIVE | Noted: 2019-02-25

## 2019-02-25 PROBLEM — B18.2 CHRONIC HEPATITIS C: Chronic | Status: ACTIVE | Noted: 2019-02-25

## 2019-02-25 PROBLEM — E11.9 TYPE 2 DIABETES MELLITUS, WITHOUT LONG-TERM CURRENT USE OF INSULIN: Chronic | Status: ACTIVE | Noted: 2019-02-25

## 2019-02-25 LAB
ALBUMIN SERPL BCP-MCNC: 2.4 G/DL
ALP SERPL-CCNC: 71 U/L
ALT SERPL W/O P-5'-P-CCNC: 35 U/L
ANION GAP SERPL CALC-SCNC: 9 MMOL/L
AST SERPL-CCNC: 48 U/L
BASOPHILS # BLD AUTO: 0.05 K/UL
BASOPHILS NFR BLD: 0.8 %
BILIRUB SERPL-MCNC: 0.4 MG/DL
BUN SERPL-MCNC: 15 MG/DL
CALCIUM SERPL-MCNC: 8.8 MG/DL
CHLORIDE SERPL-SCNC: 110 MMOL/L
CO2 SERPL-SCNC: 21 MMOL/L
CREAT SERPL-MCNC: 1 MG/DL
DIFFERENTIAL METHOD: ABNORMAL
EOSINOPHIL # BLD AUTO: 0.3 K/UL
EOSINOPHIL NFR BLD: 4.6 %
ERYTHROCYTE [DISTWIDTH] IN BLOOD BY AUTOMATED COUNT: 14.1 %
EST. GFR  (AFRICAN AMERICAN): >60 ML/MIN/1.73 M^2
EST. GFR  (NON AFRICAN AMERICAN): >60 ML/MIN/1.73 M^2
ESTIMATED AVG GLUCOSE: 111 MG/DL
GLUCOSE SERPL-MCNC: 126 MG/DL
HBA1C MFR BLD HPLC: 5.5 %
HCT VFR BLD AUTO: 36.2 %
HGB BLD-MCNC: 12.3 G/DL
LACTATE SERPL-SCNC: 1.9 MMOL/L
LYMPHOCYTES # BLD AUTO: 1.7 K/UL
LYMPHOCYTES NFR BLD: 26.2 %
MCH RBC QN AUTO: 32.1 PG
MCHC RBC AUTO-ENTMCNC: 34 G/DL
MCV RBC AUTO: 95 FL
MONOCYTES # BLD AUTO: 0.6 K/UL
MONOCYTES NFR BLD: 9.2 %
NEUTROPHILS # BLD AUTO: 3.8 K/UL
NEUTROPHILS NFR BLD: 58.9 %
PLATELET # BLD AUTO: 189 K/UL
PMV BLD AUTO: 10.3 FL
POCT GLUCOSE: 127 MG/DL (ref 70–110)
POCT GLUCOSE: 159 MG/DL (ref 70–110)
POCT GLUCOSE: 195 MG/DL (ref 70–110)
POTASSIUM SERPL-SCNC: 4.1 MMOL/L
PROT SERPL-MCNC: 7.7 G/DL
RBC # BLD AUTO: 3.83 M/UL
SODIUM SERPL-SCNC: 140 MMOL/L
WBC # BLD AUTO: 6.52 K/UL

## 2019-02-25 PROCEDURE — 93005 ELECTROCARDIOGRAM TRACING: CPT

## 2019-02-25 PROCEDURE — 96375 TX/PRO/DX INJ NEW DRUG ADDON: CPT

## 2019-02-25 PROCEDURE — 71046 X-RAY EXAM CHEST 2 VIEWS: CPT | Mod: S$GLB,,, | Performed by: RADIOLOGY

## 2019-02-25 PROCEDURE — 94640 AIRWAY INHALATION TREATMENT: CPT | Mod: S$GLB,,, | Performed by: PHYSICIAN ASSISTANT

## 2019-02-25 PROCEDURE — 96365 THER/PROPH/DIAG IV INF INIT: CPT

## 2019-02-25 PROCEDURE — 87040 BLOOD CULTURE FOR BACTERIA: CPT | Mod: 59

## 2019-02-25 PROCEDURE — 36415 COLL VENOUS BLD VENIPUNCTURE: CPT

## 2019-02-25 PROCEDURE — 86480 TB TEST CELL IMMUN MEASURE: CPT

## 2019-02-25 PROCEDURE — 80053 COMPREHEN METABOLIC PANEL: CPT

## 2019-02-25 PROCEDURE — 99223 PR INITIAL HOSPITAL CARE,LEVL III: ICD-10-PCS | Mod: ,,, | Performed by: INTERNAL MEDICINE

## 2019-02-25 PROCEDURE — 83605 ASSAY OF LACTIC ACID: CPT

## 2019-02-25 PROCEDURE — 83036 HEMOGLOBIN GLYCOSYLATED A1C: CPT

## 2019-02-25 PROCEDURE — 99223 1ST HOSP IP/OBS HIGH 75: CPT | Mod: ,,, | Performed by: INTERNAL MEDICINE

## 2019-02-25 PROCEDURE — 82962 GLUCOSE BLOOD TEST: CPT

## 2019-02-25 PROCEDURE — 63600175 PHARM REV CODE 636 W HCPCS: Performed by: EMERGENCY MEDICINE

## 2019-02-25 PROCEDURE — 99214 OFFICE O/P EST MOD 30 MIN: CPT | Mod: 25,S$GLB,, | Performed by: PHYSICIAN ASSISTANT

## 2019-02-25 PROCEDURE — 94640 PR INHAL RX, AIRWAY OBST/DX SPUTUM INDUCT: ICD-10-PCS | Mod: S$GLB,,, | Performed by: PHYSICIAN ASSISTANT

## 2019-02-25 PROCEDURE — 93010 ELECTROCARDIOGRAM REPORT: CPT | Mod: ,,, | Performed by: INTERNAL MEDICINE

## 2019-02-25 PROCEDURE — 87305 ASPERGILLUS AG IA: CPT

## 2019-02-25 PROCEDURE — 11000001 HC ACUTE MED/SURG PRIVATE ROOM

## 2019-02-25 PROCEDURE — 99285 EMERGENCY DEPT VISIT HI MDM: CPT | Mod: 25

## 2019-02-25 PROCEDURE — 25000003 PHARM REV CODE 250: Performed by: EMERGENCY MEDICINE

## 2019-02-25 PROCEDURE — 25500020 PHARM REV CODE 255: Performed by: EMERGENCY MEDICINE

## 2019-02-25 PROCEDURE — 93010 EKG 12-LEAD: ICD-10-PCS | Mod: ,,, | Performed by: INTERNAL MEDICINE

## 2019-02-25 PROCEDURE — 71046 XR CHEST PA AND LATERAL: ICD-10-PCS | Mod: S$GLB,,, | Performed by: RADIOLOGY

## 2019-02-25 PROCEDURE — 85025 COMPLETE CBC W/AUTO DIFF WBC: CPT

## 2019-02-25 PROCEDURE — 25000003 PHARM REV CODE 250: Performed by: PHYSICIAN ASSISTANT

## 2019-02-25 PROCEDURE — 99214 PR OFFICE/OUTPT VISIT, EST, LEVL IV, 30-39 MIN: ICD-10-PCS | Mod: 25,S$GLB,, | Performed by: PHYSICIAN ASSISTANT

## 2019-02-25 RX ORDER — INSULIN ASPART 100 [IU]/ML
0-5 INJECTION, SOLUTION INTRAVENOUS; SUBCUTANEOUS
Status: DISCONTINUED | OUTPATIENT
Start: 2019-02-25 | End: 2019-02-28 | Stop reason: HOSPADM

## 2019-02-25 RX ORDER — GLUCAGON 1 MG
1 KIT INJECTION
Status: DISCONTINUED | OUTPATIENT
Start: 2019-02-25 | End: 2019-02-28 | Stop reason: HOSPADM

## 2019-02-25 RX ORDER — FLUTICASONE FUROATE AND VILANTEROL 200; 25 UG/1; UG/1
1 POWDER RESPIRATORY (INHALATION) DAILY
Status: DISCONTINUED | OUTPATIENT
Start: 2019-02-26 | End: 2019-02-28 | Stop reason: HOSPADM

## 2019-02-25 RX ORDER — IBUPROFEN 200 MG
16 TABLET ORAL
Status: DISCONTINUED | OUTPATIENT
Start: 2019-02-25 | End: 2019-02-28 | Stop reason: HOSPADM

## 2019-02-25 RX ORDER — IPRATROPIUM BROMIDE AND ALBUTEROL SULFATE 2.5; .5 MG/3ML; MG/3ML
3 SOLUTION RESPIRATORY (INHALATION) EVERY 4 HOURS PRN
Status: DISCONTINUED | OUTPATIENT
Start: 2019-02-25 | End: 2019-02-28 | Stop reason: HOSPADM

## 2019-02-25 RX ORDER — OXYCODONE AND ACETAMINOPHEN 10; 325 MG/1; MG/1
1 TABLET ORAL EVERY 6 HOURS PRN
Status: DISCONTINUED | OUTPATIENT
Start: 2019-02-25 | End: 2019-02-28 | Stop reason: HOSPADM

## 2019-02-25 RX ORDER — IBUPROFEN 200 MG
24 TABLET ORAL
Status: DISCONTINUED | OUTPATIENT
Start: 2019-02-25 | End: 2019-02-28 | Stop reason: HOSPADM

## 2019-02-25 RX ORDER — BENZONATATE 100 MG/1
200 CAPSULE ORAL 3 TIMES DAILY PRN
Status: DISCONTINUED | OUTPATIENT
Start: 2019-02-25 | End: 2019-02-28 | Stop reason: HOSPADM

## 2019-02-25 RX ORDER — IPRATROPIUM BROMIDE 0.5 MG/2.5ML
0.5 SOLUTION RESPIRATORY (INHALATION)
Status: DISCONTINUED | OUTPATIENT
Start: 2019-02-25 | End: 2019-02-25 | Stop reason: HOSPADM

## 2019-02-25 RX ORDER — SODIUM CHLORIDE 0.9 % (FLUSH) 0.9 %
5 SYRINGE (ML) INJECTION
Status: CANCELLED | OUTPATIENT
Start: 2019-02-25

## 2019-02-25 RX ORDER — ALBUTEROL SULFATE 0.83 MG/ML
2.5 SOLUTION RESPIRATORY (INHALATION)
Status: DISCONTINUED | OUTPATIENT
Start: 2019-02-25 | End: 2019-02-25 | Stop reason: HOSPADM

## 2019-02-25 RX ORDER — SODIUM CHLORIDE 0.9 % (FLUSH) 0.9 %
5 SYRINGE (ML) INJECTION
Status: DISCONTINUED | OUTPATIENT
Start: 2019-02-25 | End: 2019-02-28 | Stop reason: HOSPADM

## 2019-02-25 RX ADMIN — IOHEXOL 75 ML: 350 INJECTION, SOLUTION INTRAVENOUS at 05:02

## 2019-02-25 RX ADMIN — OXYCODONE HYDROCHLORIDE AND ACETAMINOPHEN 1 TABLET: 10; 325 TABLET ORAL at 09:02

## 2019-02-25 RX ADMIN — ALBUTEROL SULFATE 2.5 MG: 0.83 SOLUTION RESPIRATORY (INHALATION) at 09:02

## 2019-02-25 RX ADMIN — PIPERACILLIN AND TAZOBACTAM 4.5 G: 4; .5 INJECTION, POWDER, LYOPHILIZED, FOR SOLUTION INTRAVENOUS; PARENTERAL at 03:02

## 2019-02-25 RX ADMIN — VANCOMYCIN HYDROCHLORIDE 1500 MG: 1 INJECTION, POWDER, LYOPHILIZED, FOR SOLUTION INTRAVENOUS at 04:02

## 2019-02-25 RX ADMIN — IPRATROPIUM BROMIDE 0.5 MG: 0.5 SOLUTION RESPIRATORY (INHALATION) at 09:02

## 2019-02-25 NOTE — ED NOTES
Transport at  for pt to be transported. Pt AAOx4 and appropriate at this time. Respirations even and unlabored. No acute distress noted. Skin warm and dry. IV site with no surrounding swelling or pain reported by pt at this time.

## 2019-02-25 NOTE — SUBJECTIVE & OBJECTIVE
Past Medical History:   Diagnosis Date    Anxiety     Diabetes mellitus     Diabetes mellitus, type 2     Kidney stone     Urinary tract infection      Past Surgical History:   Procedure Laterality Date    KIDNEY STONE SURGERY      NECK SURGERY      secondary to previous fall     Review of patient's allergies indicates:  No Known Allergies    Current Facility-Administered Medications on File Prior to Encounter   Medication    [COMPLETED] albuterol nebulizer solution 2.5 mg    [COMPLETED] ipratropium 0.02 % nebulizer solution 0.5 mg     Current Outpatient Medications on File Prior to Encounter   Medication Sig    albuterol (PROVENTIL/VENTOLIN HFA) 90 mcg/actuation inhaler Inhale 1-2 puffs into the lungs every 6 (six) hours as needed for Wheezing. Rescue    azithromycin (Z-GHULAM) 250 MG tablet Take 2 tablets by mouth on day 1; Take 1 tablet by mouth on days 2-5    fluticasone-vilanterol (BREO) 200-25 mcg/dose DsDv diskus inhaler Inhale 1 puff into the lungs once daily. Controller    ibuprofen (ADVIL,MOTRIN) 800 MG tablet Take 1 tablet (800 mg total) by mouth every 6 (six) hours as needed for Pain.    ipratropium (ATROVENT) 42 mcg (0.06 %) nasal spray 2 sprays by Nasal route 4 (four) times daily.    methylPREDNISolone (MEDROL DOSEPACK) 4 mg tablet use as directed    metFORMIN (GLUCOPHAGE) 500 MG tablet Take 1 tablet (500 mg total) by mouth 2 (two) times daily with meals.    multivitamin-Ca-iron-minerals (MULTIVITAMIN-CALCIUM AND IRON) Tab Take 1 tablet by mouth once daily.    ondansetron (ZOFRAN-ODT) 4 MG TbDL     oxyCODONE-acetaminophen (PERCOCET)  mg per tablet      Family History     Problem Relation (Age of Onset)    Diabetes Mother    Hypertension Mother    Stroke Mother        Tobacco Use    Smoking status: Former Smoker     Packs/day: 1.50     Years: 40.00     Pack years: 60.00     Last attempt to quit: 2019     Years since quittin.0    Smokeless tobacco: Never Used    Substance and Sexual Activity    Alcohol use: No    Drug use: Yes     Types: Oxycodone    Sexual activity: Not Currently     Review of Systems   Constitutional: Positive for chills, fatigue and unexpected weight change. Negative for fever.   HENT: Negative for sore throat and trouble swallowing.    Eyes: Negative for pain and visual disturbance.   Respiratory: Positive for cough and shortness of breath.    Cardiovascular: Negative for chest pain and palpitations.   Gastrointestinal: Negative for abdominal pain, nausea and vomiting.   Genitourinary: Negative for difficulty urinating and dysuria.   Musculoskeletal: Negative for arthralgias and joint swelling.   Skin: Negative for rash and wound.   Neurological: Negative for weakness and numbness.     Objective:     Vital Signs (Most Recent):  Temp: 98.3 °F (36.8 °C) (02/25/19 1654)  Pulse: 72 (02/25/19 1655)  Resp: 18 (02/25/19 1652)  BP: 102/63 (02/25/19 1655)  SpO2: 99 % (02/25/19 1655) Vital Signs (24h Range):  Temp:  [97.2 °F (36.2 °C)-98.3 °F (36.8 °C)] 98.3 °F (36.8 °C)  Pulse:  [66-87] 72  Resp:  [16-20] 18  SpO2:  [98 %-99 %] 99 %  BP: ()/(54-75) 102/63     Weight: 68.5 kg (151 lb)  Body mass index is 23.65 kg/m².    Physical Exam   Constitutional: He is oriented to person, place, and time. He appears well-developed. No distress.   Cachectic.   HENT:   Head: Normocephalic and atraumatic.   Eyes: Conjunctivae and EOM are normal.   Cardiovascular: Normal rate, regular rhythm, S1 normal, S2 normal and intact distal pulses.   Pulmonary/Chest: Effort normal.   Mild crackles R base.   Abdominal: Soft. He exhibits no distension. There is no tenderness.   Musculoskeletal: Normal range of motion. He exhibits no edema.   Neurological: He is alert and oriented to person, place, and time.   Skin: Skin is warm and dry.   Nursing note and vitals reviewed.     Significant Labs:   Recent Results (from the past 24 hour(s))   POCT glucose    Collection Time: 02/25/19   2:45 PM   Result Value Ref Range    POCT Glucose 127 (H) 70 - 110 mg/dL   Comprehensive metabolic panel    Collection Time: 02/25/19  2:52 PM   Result Value Ref Range    Sodium 140 136 - 145 mmol/L    Potassium 4.1 3.5 - 5.1 mmol/L    Chloride 110 95 - 110 mmol/L    CO2 21 (L) 23 - 29 mmol/L    Glucose 126 (H) 70 - 110 mg/dL    BUN, Bld 15 8 - 23 mg/dL    Creatinine 1.0 0.5 - 1.4 mg/dL    Calcium 8.8 8.7 - 10.5 mg/dL    Total Protein 7.7 6.0 - 8.4 g/dL    Albumin 2.4 (L) 3.5 - 5.2 g/dL    Total Bilirubin 0.4 0.1 - 1.0 mg/dL    Alkaline Phosphatase 71 55 - 135 U/L    AST 48 (H) 10 - 40 U/L    ALT 35 10 - 44 U/L    Anion Gap 9 8 - 16 mmol/L    eGFR if African American >60 >60 mL/min/1.73 m^2    eGFR if non African American >60 >60 mL/min/1.73 m^2   CBC auto differential    Collection Time: 02/25/19  2:52 PM   Result Value Ref Range    WBC 6.52 3.90 - 12.70 K/uL    RBC 3.83 (L) 4.60 - 6.20 M/uL    Hemoglobin 12.3 (L) 14.0 - 18.0 g/dL    Hematocrit 36.2 (L) 40.0 - 54.0 %    MCV 95 82 - 98 fL    MCH 32.1 (H) 27.0 - 31.0 pg    MCHC 34.0 32.0 - 36.0 g/dL    RDW 14.1 11.5 - 14.5 %    Platelets 189 150 - 350 K/uL    MPV 10.3 9.2 - 12.9 fL    Gran # (ANC) 3.8 1.8 - 7.7 K/uL    Lymph # 1.7 1.0 - 4.8 K/uL    Mono # 0.6 0.3 - 1.0 K/uL    Eos # 0.3 0.0 - 0.5 K/uL    Baso # 0.05 0.00 - 0.20 K/uL    Gran% 58.9 38.0 - 73.0 %    Lymph% 26.2 18.0 - 48.0 %    Mono% 9.2 4.0 - 15.0 %    Eosinophil% 4.6 0.0 - 8.0 %    Basophil% 0.8 0.0 - 1.9 %    Differential Method Automated    Lactic acid, plasma    Collection Time: 02/25/19  2:52 PM   Result Value Ref Range    Lactate (Lactic Acid) 1.9 0.5 - 2.2 mmol/L     Significant Imaging:   CT Chest WO Contrast 01/15/19:  No CT evidence of acute intrathoracic abnormality. Small volume of layering dependent material within the distal trachea may relate to secretions or aspiration. Clinical correlation is advised. 0.5 cm pulmonary nodule within the left upper lobe.  For a solid nodule <6 mm,  Fleischner Society 2017 guidelines recommend no routine follow up for a low risk patient, or follow-up with non-contrast chest CT at 12 months in a high risk patient. Mild centrilobular emphysematous change of the lungs. Scattered coronary artery calcifications.    CXR 02/25/19:  Increasing opacification at the right lower lung zone with interval development of blunting of the right costophrenic angle.  Findings likely represent worsening right middle lobe pneumonia along with right pleural effusion with associated volume loss/consolidation in the right lower lobe.

## 2019-02-25 NOTE — PATIENT INSTRUCTIONS
ER Referral   Your condition is serious and requires immediate attention and evaluation in an ER setting.    GO STRAIGHT TO THE ER AND DO NOT EAT OR DRINK ANYTHING UNLESS A HEALTHCARE PROVIDER GIVES IT TO YOU.     Discussed case with NP on site and agreed with patient plan of care to recommend ER for possible admission for worsening pneumonia. Patient aware and verbalized understanding. Patient reports that he will go by private vehicle. Patient is stable upon discharge and ambulates without difficulty.    Pneumonia (Adult)  Pneumonia is an infection deep within the lungs. It is in the small air sacs (alveoli). Pneumonia may be caused by a virus or bacteria. Pneumonia caused by bacteria is usually treated with an antibiotic. Severe cases may need to be treated in the hospital. Milder cases can be treated at home. Symptoms usually start to get better during the first 2 days of treatment.    Home care  Follow these guidelines when caring for yourself at home:  · Rest at home for the first 2 to 3 days, or until you feel stronger. Dont let yourself get overly tired when you go back to your activities.  · Stay away from cigarette smoke - yours or other peoples.  · You may use acetaminophen or ibuprofen to control fever or pain, unless another medicine was prescribed. If you have chronic liver or kidney disease, talk with your healthcare provider before using these medicines. Also talk with your provider if youve had a stomach ulcer or gastrointestinal bleeding. Dont give aspirin to anyone younger than 18 years of age who is ill with a fever. It may cause severe liver damage.  · Your appetite may be poor, so a light diet is fine.  · Drink 6 to 8 glasses of fluids every day to make sure you are getting enough fluids. Beverages can include water, sport drinks, sodas without caffeine, juices, tea, or soup. Fluids will help loosen secretions in the lung. This will make it easier for you to cough up the phlegm (sputum). If  you also have heart or kidney disease, check with your healthcare provider before you drink extra fluids.  · Take antibiotic medicine prescribed until it is all gone, even if you are feeling better after a few days.  Follow-up care  Follow up with your healthcare provider in the next 2 to 3 days, or as advised. This is to be sure the medicine is helping you get better.  If you are 65 or older, you should get a pneumococcal vaccine and a yearly flu (influenza) shot. You should also get these vaccines if you have chronic lung disease like asthma, emphysema, or COPD. Recently, a second type of pneumonia vaccine has become available for everyone over 65 years old. This is in addition to the previous vaccine. Ask your provider about this.  When to seek medical advice  Call your healthcare provider right away if any of these occur:  · You dont get better within the first 48 hours of treatment  · Shortness of breath gets worse  · Rapid breathing (more than 25 breaths per minute)  · Coughing up blood  · Chest pain gets worse with breathing  · Fever of 100.4°F (38°C) or higher that doesnt get better with fever medicine  · Weakness, dizziness, or fainting that gets worse  · Thirst or dry mouth that gets worse  · Sinus pain, headache, or a stiff neck  · Chest pain not caused by coughing  Date Last Reviewed: 1/1/2017  © 0325-8271 [employer], [employer]. [address] [place] [address], [place] [address]. All rights reserved. This information is not intended as a substitute for professional medical care. Always follow your healthcare professional's instructions.

## 2019-02-25 NOTE — ED NOTES
Pt to ED reporting he was diagnosed with pneumonia over the last 3 weeks, reporting coughing has gotten worse over the last couple days, was seen at urgent care PTA. Has been taking PO antibiotics, reporting no relief in cough. Pt also reporting SOB, CP, and weakness/fatigue. Pt AAOx4 and appropriate at this time. Respirations even and unlabored. No acute distress noted. Awaiting further orders. Pt updated on POC. Bed is locked and in lowest position with side rails up x2. Call bell within reach and pt oriented to use of call bell. Pt placed on continuous cardiac monitoring, continuous pulse ox, and continuous BP cuff. Will continue to monitor.

## 2019-02-25 NOTE — ED PROVIDER NOTES
Encounter Date: 2019    SCRIBE #1 NOTE: ILorena, am scribing for, and in the presence of, Dr. Adams.       History     Chief Complaint   Patient presents with    Cough     Cough x several weeks. Pt sent from  for evaluation of pneumonia diagnosed with an XRAY done PTA.      Time seen by provider: 2:29 PM    This is a 63 y.o. male with hx of DM who presents with complaint of cough for over six weeks worsening in the last week. He has had multiple ED and Urgent Care visits for the same complaint and denies improvement with several courses of antibiotics.  He states that his symptoms have significantly worsened over the past week.  He was sent here from Urgent Care after X-ray showed pneumonia. He reports chest tightness with inspiration. He denies fever. He has quit smoking, but used to smoke 4 PPD. He denies past diagnosis of asthma or COPD. He notes his sugar level was 1300 several weeks ago.      The history is provided by the patient.     Review of patient's allergies indicates:  No Known Allergies  Past Medical History:   Diagnosis Date    Anxiety     Diabetes mellitus 2017    Diabetes mellitus, type 2     Kidney stone     Urinary tract infection      Past Surgical History:   Procedure Laterality Date    KIDNEY STONE SURGERY      NECK SURGERY      secondary to previous fall     Family History   Problem Relation Age of Onset    Hypertension Mother     Diabetes Mother     Stroke Mother     Prostate cancer Neg Hx     Kidney disease Neg Hx      Social History     Tobacco Use    Smoking status: Former Smoker     Packs/day: 1.50     Years: 40.00     Pack years: 60.00     Last attempt to quit: 2019     Years since quittin.0    Smokeless tobacco: Never Used   Substance Use Topics    Alcohol use: No    Drug use: Yes     Types: Oxycodone     Review of Systems   Constitutional: Negative for chills and fever.   HENT: Negative for congestion and sore throat.    Respiratory: Positive for  cough and chest tightness. Negative for shortness of breath.    Cardiovascular: Negative for chest pain.   Gastrointestinal: Negative for abdominal pain, diarrhea, nausea and vomiting.   Genitourinary: Negative for dysuria and flank pain.   Musculoskeletal: Negative for back pain.   Skin: Negative for rash.   Neurological: Negative for dizziness, weakness and headaches.   Hematological: Does not bruise/bleed easily.       Physical Exam     Initial Vitals [02/25/19 1347]   BP Pulse Resp Temp SpO2   (!) 106/56 84 18 97.2 °F (36.2 °C) 98 %      MAP       --         Physical Exam    Nursing note and vitals reviewed.  Constitutional: He appears well-developed and well-nourished. He is not diaphoretic. No distress.   HENT:   Head: Normocephalic and atraumatic.   Eyes: Conjunctivae and EOM are normal. No scleral icterus.   Neck: Normal range of motion. Neck supple.   Cardiovascular: Normal rate, regular rhythm and normal heart sounds.   Pulmonary/Chest: No respiratory distress. He has wheezes (inspiratory and expiratory). He has no rales. He exhibits no tenderness.   Musculoskeletal: Normal range of motion. He exhibits no edema or tenderness.   Neurological: He is alert and oriented to person, place, and time.   Ambulatory with steady gait.   Skin: Skin is warm and dry.   Psychiatric: He has a normal mood and affect. His behavior is normal. Judgment and thought content normal.         ED Course   Procedures  Labs Reviewed   COMPREHENSIVE METABOLIC PANEL - Abnormal; Notable for the following components:       Result Value    CO2 21 (*)     Glucose 126 (*)     Albumin 2.4 (*)     AST 48 (*)     All other components within normal limits   CBC W/ AUTO DIFFERENTIAL - Abnormal; Notable for the following components:    RBC 3.83 (*)     Hemoglobin 12.3 (*)     Hematocrit 36.2 (*)     MCH 32.1 (*)     All other components within normal limits   POCT GLUCOSE - Abnormal; Notable for the following components:    POCT Glucose 127 (*)      All other components within normal limits   CULTURE, BLOOD   CULTURE, BLOOD   LACTIC ACID, PLASMA     EKG Readings: (Independently Interpreted)   1427: Rate of 71. Normal sinus rhythm. Normal intervals. Normal axis. No ST or ischemic changes.        Imaging Results    None          Medical Decision Making:   History:   Old Medical Records: I decided to obtain old medical records.  Old Records Summarized: records from clinic visits and other records.  Initial Assessment:   2:29PM:  Patient is a 63-year-old male who presents to the emergency department with a persistent cough and shortness of breath, worsening over the past week.  Patient has had multiple trials of various p.o. antibiotics with no improvement.  His x-ray does show worsening of his pneumonia compared to 2 weeks ago.  Will plan to treat with IV antibiotics, along with cultures, lactic acid, labs, will continue to follow and reassess.  Independently Interpreted Test(s):   I have ordered and independently interpreted EKG Reading(s) - see prior notes  Clinical Tests:   Lab Tests: Ordered and Reviewed  Radiological Study: Reviewed  Medical Tests: Ordered and Reviewed  Other:   I have discussed this case with another health care provider.    3:20 PM:  Patient doing well, he remained stable. His blood count and lactic acid are all within normal limits.  I have started IV antibiotics.  Will plan to admit for further observation and management given his multiple failed outpatient antibiotics.  Patient agreeable to plan and all questions answered.  Discussed case with Dr. Grimm, and will admit patient to his service.            Scribe Attestation:   Scribe #1: I performed the above scribed service and the documentation accurately describes the services I performed. I attest to the accuracy of the note.    Attending Attestation:           Physician Attestation for Scribe:  Physician Attestation Statement for Scribe #1: I, Dr. Adams, reviewed documentation, as  scribed by Lorena Lazaro in my presence, and it is both accurate and complete.                    Clinical Impression:     1. Pneumonia of right middle lobe due to infectious organism    2. SOB (shortness of breath)                                 Micki Adams MD  02/25/19 7498

## 2019-02-25 NOTE — ED PROVIDER NOTES
Encounter Date: 2019       History     Chief Complaint   Patient presents with    Cough     Cough x several weeks. Pt sent from  for evaluation of pneumonia diagnosed with an XRAY done PTA.      HPI  Review of patient's allergies indicates:  No Known Allergies  Past Medical History:   Diagnosis Date    Anxiety     Diabetes mellitus 2017    Diabetes mellitus, type 2     Kidney stone     Urinary tract infection      Past Surgical History:   Procedure Laterality Date    KIDNEY STONE SURGERY      NECK SURGERY      secondary to previous fall     Family History   Problem Relation Age of Onset    Hypertension Mother     Diabetes Mother     Stroke Mother     Prostate cancer Neg Hx     Kidney disease Neg Hx      Social History     Tobacco Use    Smoking status: Former Smoker     Packs/day: 1.50     Years: 40.00     Pack years: 60.00     Last attempt to quit: 2019     Years since quittin.0    Smokeless tobacco: Never Used   Substance Use Topics    Alcohol use: No    Drug use: Yes     Types: Oxycodone     Review of Systems    Physical Exam     Initial Vitals [19 1347]   BP Pulse Resp Temp SpO2   (!) 106/56 84 18 97.2 °F (36.2 °C) 98 %      MAP       --         Physical Exam    ED Course   Procedures  Labs Reviewed - No data to display       Imaging Results    None                               Clinical Impression:   {Add your Clinical Impression here. If you haven't documented one yet, please pend the note, finalize a Clinical Impression, and refresh your note before signing.:[id number]}

## 2019-02-25 NOTE — ED NOTES
Pt requesting pain medication at this time. Hospital medicine notified via electronic messaging via Epic.

## 2019-02-25 NOTE — HPI
Mr. Richter is a 63/M with PMH DMII, tobacco abuse who presented to ED from Urgent Care 02/25 with a 4-5 week history of persistent cough and shortness of breath. He states that symptoms began multiple weeks ago initially with cough; it has mostly been nonproductive. He denies fevers, but attests to intermittent chills; no night sweats or rigors. He has been seen in the ED at [facility] and [facility] as well as at Urgent Care multiple times in the last month. He has been prescribed azithromycin (which was reportedly taken incorrectly from prior ED notes), cefdinir, received IM ceftriaxone, and doxycycline which he has taken; he was additionally prescribed moxifloxacin PO but was unable to pick it up due to cost. He has been prescribed multiple inhalers which he has depleted several times over the past several weeks due to frequency of use with his SOB.    Of note, he reports no prior incarceration but has been homeless in the past. He likewise reports since moving to [place] from [place] in the last 10 months that he has lost >30lb. Denies significant productive cough or blood-tinged mucus. Quit smoking approx. 02/21 after being counseled to quit during his ED visits.

## 2019-02-25 NOTE — PROGRESS NOTES
"Subjective:       Patient ID: Josiah Richter is a 63 y.o. male.    Vitals:    02/25/19 0838   BP: 103/64   Pulse: 87   Resp: 16   Temp: 97.6 °F (36.4 °C)   SpO2: 98%   Weight: 68.5 kg (151 lb)   Height: 5' 7" (1.702 m)       Chief Complaint: Cough    Pt states chronic cough and recent pneumonia. Pt was seen at [facility] and treated. Pt states his cough still has not gone away and he is having trouble breathing while seated.     Patient was seen here on 2/7/2019 - diagnosed with pneumonia and treated appropriately with rocephin, omnicef, doxycycline, breo, tessalon perles, atrovent, and medrol dosepack.    Patient was seen in the ED on 2/15/2019 - refilled medications including albuterol inhaler, breo and nasal spray.       Cough   This is a chronic problem. The current episode started more than 1 month ago. The problem has been gradually worsening. The cough is non-productive. Associated symptoms include shortness of breath. Pertinent negatives include no chest pain, chills, ear pain, fever, headaches, rash or sore throat. Nothing aggravates the symptoms. He has tried steroid inhaler for the symptoms. The treatment provided mild relief. His past medical history is significant for pneumonia.     Review of Systems   Constitution: Negative for chills, diaphoresis, fever, weakness and malaise/fatigue.   HENT: Positive for congestion. Negative for ear pain and sore throat.    Eyes: Negative for blurred vision.   Cardiovascular: Negative for chest pain, palpitations and syncope.   Respiratory: Positive for cough and shortness of breath.    Skin: Negative for rash.   Musculoskeletal: Negative for back pain, falls and joint pain.   Gastrointestinal: Negative for abdominal pain, diarrhea, nausea and vomiting.   Genitourinary: Negative for dysuria.   Neurological: Negative for dizziness and headaches.   Psychiatric/Behavioral: The patient is not nervous/anxious.        Objective:      Physical Exam "   Constitutional: He is oriented to person, place, and time. He appears well-developed and well-nourished. He is cooperative.  Non-toxic appearance. He does not appear ill. No distress.   HENT:   Head: Normocephalic and atraumatic.   Right Ear: Hearing, tympanic membrane, external ear and ear canal normal.   Left Ear: Hearing, tympanic membrane, external ear and ear canal normal.   Nose: Nose normal. No mucosal edema, rhinorrhea or nasal deformity. No epistaxis. Right sinus exhibits no maxillary sinus tenderness and no frontal sinus tenderness. Left sinus exhibits no maxillary sinus tenderness and no frontal sinus tenderness.   Mouth/Throat: Uvula is midline, oropharynx is clear and moist and mucous membranes are normal. No trismus in the jaw. Normal dentition. No uvula swelling. No oropharyngeal exudate, posterior oropharyngeal edema or posterior oropharyngeal erythema.   Eyes: Conjunctivae and lids are normal. No scleral icterus.   Sclera clear bilat   Neck: Trachea normal, full passive range of motion without pain and phonation normal. Neck supple.   Cardiovascular: Normal rate, regular rhythm, normal heart sounds, intact distal pulses and normal pulses.   Pulmonary/Chest: Effort normal. No accessory muscle usage or stridor. No respiratory distress. He has no decreased breath sounds. He has wheezes in the right upper field, the right lower field, the left upper field and the left lower field. He has no rhonchi. He has no rales.   Abdominal: Soft. Normal appearance and bowel sounds are normal. He exhibits no distension. There is no tenderness.   Musculoskeletal: Normal range of motion. He exhibits no edema or deformity.   Lymphadenopathy:     He has no cervical adenopathy.   Neurological: He is alert and oriented to person, place, and time. He exhibits normal muscle tone. Coordination normal.   Skin: Skin is warm, dry and intact. No rash noted. He is not diaphoretic. No pallor.   Psychiatric: He has a normal mood  and affect. His speech is normal and behavior is normal. Judgment and thought content normal. Cognition and memory are normal.   Nursing note and vitals reviewed.        Xr Chest Pa And Lateral    Result Date: 2/25/2019  EXAMINATION: XR CHEST PA AND LATERAL CLINICAL HISTORY: Cough TECHNIQUE: PA and lateral views of the chest were performed. COMPARISON: 02/15/2019 FINDINGS: Heart size and pulmonary vascularity remain within normal limits.  Atherosclerotic calcification in the wall of the aortic arch.  The left lung is well expanded and clear.  Increasing opacification at the right lower lung zone with blunting of the right costophrenic angle now present.  As best seen on the lateral view, these findings most likely represent worsening pneumonia involving the right middle lobe.  There is blunting of the left costophrenic angle posteriorly suggesting some pleural fluid on that side.  Increased markings in the left lower lobe region are felt to more likely represent atelectasis although early infiltrate in this area cannot be completely excluded.  The right upper lung zone remains clear.  Degenerative changes involving the thoracic spine with mild loss of height of some of the upper thoracic vertebral bodies as before.  The     Increasing opacification at the right lower lung zone with interval development of blunting of the right costophrenic angle.  Findings likely represent worsening right middle lobe pneumonia along with right pleural effusion with associated volume loss/consolidation in the right lower lobe. This report was flagged in Epic as abnormal. Electronically signed by: Rigo Crockett MD Date:    02/25/2019 Time:    09:21    X-ray Chest Pa And Lateral    Result Date: 2/15/2019  EXAMINATION: XR CHEST PA AND LATERAL CLINICAL HISTORY: Cough TECHNIQUE: PA and lateral views of the chest were performed. COMPARISON: 02/02/2019 FINDINGS: The cardiomediastinal silhouette is within normal limits.  There is  atherosclerosis of the thoracic aorta.  The visualized airway is unremarkable.  The lungs are symmetrically expanded with persistent coarse interstitial attenuation.  There is a persistent region of abnormal opacity involving the right middle lobe concerning for underlying infectious process such as pneumonia/aspiration in the appropriate clinical setting.  The left lung is essentially clear.  No significant volume of pleural fluid or pneumothorax.  Visualized osseous structures demonstrate postoperative change of the cervical spine as well as degenerative change.     Findings concerning for right middle lobe pneumonia, similar to most recent chest radiograph of 02/02/2019.  Following appropriate treatment, recommend repeat chest radiograph after interval of 4 to 6 weeks as the radiographic resolution of pneumonia lags behind clinical resolution.  If the patient does not have convincing evidence of pneumonia, consider further evaluation with noncontrast chest CT. This report was flagged in Epic as abnormal. Electronically signed by: Sommer Foster MD Date:    02/15/2019 Time:    04:34    X-ray Chest Pa And Lateral    Result Date: 2/2/2019  EXAMINATION: XR CHEST PA AND LATERAL CLINICAL HISTORY: Cough TECHNIQUE: PA and lateral views of the chest were performed. COMPARISON: 01/31/2019 FINDINGS: Continued abnormal soft tissue opacity in the right middle lobe anteriorly the suggestive for a pneumonia in the appropriate clinical setting.  The left lung is clear.  The heart is normal in size.  Calcified atheromatous disease affects the aorta.  Age-appropriate degenerative changes affect the skeleton.     Suspected right middle lobe pneumonia, the overall appearance of which is similar to the previous study of 01/31/2019. Electronically signed by: Natasha Alvarado MD Date:    02/02/2019 Time:    07:53    X-ray Chest Pa And Lateral    Result Date: 1/31/2019  EXAMINATION: XR CHEST PA AND LATERAL CLINICAL HISTORY: Shortness of  breath TECHNIQUE: PA and lateral views of the chest were performed. COMPARISON: Chest CT: 01/15/2019. Chest radiograph: 01/15/2019.  01/13/2019. FINDINGS: Soft tissue opacity in the anterior inferior aspect of the middle lobe accounts for increased attenuation in that region on lateral chest radiograph.  It may account for increased attenuation overlying the medial aspect of the right lower lung zone on PA view, projected over the anterior aspect of the 6th rib at the level of the 9th rib. I detect no other focal pulmonary disease.  Allowing for technical factors I doubt interstitial lung disease.  I detect no pleural fluid, pneumothorax, pneumomediastinum or pneumoperitoneum and no lymph node enlargement or cardiac decompensation in this 63-year-old man with calcific plaque at the level of the arch. Hardware is present in the distal cervical spine.  Degenerative changes are present at the acromioclavicular joints.     Abnormal chest radiograph. Electronically signed by: April Feliciano MD Date:    01/31/2019 Time:    15:22    Post-breathing treatment: Patient reports feeling better, but still SOB. PO2 sats still at 98%. Patient is stable, in NAD.    Assessment:       1. Pneumonia of right middle lobe due to infectious organism    2. Wheezing    3. Cough        Plan:       Josiah was seen today for cough.    Diagnoses and all orders for this visit:    Pneumonia of right middle lobe due to infectious organism  -     Refer to Emergency Dept.    Wheezing  -     ipratropium 0.02 % nebulizer solution 0.5 mg  -     albuterol nebulizer solution 2.5 mg    Cough  -     XR CHEST PA AND LATERAL; Future      Patient Instructions   ER Referral   Your condition is serious and requires immediate attention and evaluation in an ER setting.    GO STRAIGHT TO THE ER AND DO NOT EAT OR DRINK ANYTHING UNLESS A HEALTHCARE PROVIDER GIVES IT TO YOU.     Discussed case with NP on site and agreed with patient plan of care to recommend ER for  possible admission for worsening pneumonia. Patient aware and verbalized understanding. Patient reports that he will go by private vehicle. Patient is stable upon discharge and ambulates without difficulty.    Pneumonia (Adult)  Pneumonia is an infection deep within the lungs. It is in the small air sacs (alveoli). Pneumonia may be caused by a virus or bacteria. Pneumonia caused by bacteria is usually treated with an antibiotic. Severe cases may need to be treated in the hospital. Milder cases can be treated at home. Symptoms usually start to get better during the first 2 days of treatment.    Home care  Follow these guidelines when caring for yourself at home:  · Rest at home for the first 2 to 3 days, or until you feel stronger. Dont let yourself get overly tired when you go back to your activities.  · Stay away from cigarette smoke - yours or other peoples.  · You may use acetaminophen or ibuprofen to control fever or pain, unless another medicine was prescribed. If you have chronic liver or kidney disease, talk with your healthcare provider before using these medicines. Also talk with your provider if youve had a stomach ulcer or gastrointestinal bleeding. Dont give aspirin to anyone younger than 18 years of age who is ill with a fever. It may cause severe liver damage.  · Your appetite may be poor, so a light diet is fine.  · Drink 6 to 8 glasses of fluids every day to make sure you are getting enough fluids. Beverages can include water, sport drinks, sodas without caffeine, juices, tea, or soup. Fluids will help loosen secretions in the lung. This will make it easier for you to cough up the phlegm (sputum). If you also have heart or kidney disease, check with your healthcare provider before you drink extra fluids.  · Take antibiotic medicine prescribed until it is all gone, even if you are feeling better after a few days.  Follow-up care  Follow up with your healthcare provider in the next 2 to 3 days, or  as advised. This is to be sure the medicine is helping you get better.  If you are 65 or older, you should get a pneumococcal vaccine and a yearly flu (influenza) shot. You should also get these vaccines if you have chronic lung disease like asthma, emphysema, or COPD. Recently, a second type of pneumonia vaccine has become available for everyone over 65 years old. This is in addition to the previous vaccine. Ask your provider about this.  When to seek medical advice  Call your healthcare provider right away if any of these occur:  · You dont get better within the first 48 hours of treatment  · Shortness of breath gets worse  · Rapid breathing (more than 25 breaths per minute)  · Coughing up blood  · Chest pain gets worse with breathing  · Fever of 100.4°F (38°C) or higher that doesnt get better with fever medicine  · Weakness, dizziness, or fainting that gets worse  · Thirst or dry mouth that gets worse  · Sinus pain, headache, or a stiff neck  · Chest pain not caused by coughing  Date Last Reviewed: 1/1/2017  © 2306-1307 [employer] [address] [place] [address], [place] [address]. All rights reserved. This information is not intended as a substitute for professional medical care. Always follow your healthcare professional's instructions.

## 2019-02-26 LAB
ALBUMIN SERPL BCP-MCNC: 2.2 G/DL
ALP SERPL-CCNC: 65 U/L
ALT SERPL W/O P-5'-P-CCNC: 30 U/L
ANION GAP SERPL CALC-SCNC: 7 MMOL/L
AST SERPL-CCNC: 34 U/L
BASOPHILS # BLD AUTO: 0.02 K/UL
BASOPHILS NFR BLD: 0.4 %
BILIRUB SERPL-MCNC: 0.5 MG/DL
BUN SERPL-MCNC: 12 MG/DL
CALCIUM SERPL-MCNC: 8.5 MG/DL
CHLORIDE SERPL-SCNC: 112 MMOL/L
CO2 SERPL-SCNC: 21 MMOL/L
CREAT SERPL-MCNC: 0.9 MG/DL
DIFFERENTIAL METHOD: ABNORMAL
EOSINOPHIL # BLD AUTO: 0.3 K/UL
EOSINOPHIL NFR BLD: 6.9 %
ERYTHROCYTE [DISTWIDTH] IN BLOOD BY AUTOMATED COUNT: 14.1 %
EST. GFR  (AFRICAN AMERICAN): >60 ML/MIN/1.73 M^2
EST. GFR  (NON AFRICAN AMERICAN): >60 ML/MIN/1.73 M^2
GLUCOSE SERPL-MCNC: 93 MG/DL
HCT VFR BLD AUTO: 36.4 %
HGB BLD-MCNC: 12.2 G/DL
LYMPHOCYTES # BLD AUTO: 1.7 K/UL
LYMPHOCYTES NFR BLD: 37.5 %
MAGNESIUM SERPL-MCNC: 1.5 MG/DL
MCH RBC QN AUTO: 31.5 PG
MCHC RBC AUTO-ENTMCNC: 33.5 G/DL
MCV RBC AUTO: 94 FL
MONOCYTES # BLD AUTO: 0.2 K/UL
MONOCYTES NFR BLD: 5 %
NEUTROPHILS # BLD AUTO: 2.3 K/UL
NEUTROPHILS NFR BLD: 50 %
PHOSPHATE SERPL-MCNC: 3.6 MG/DL
PLATELET # BLD AUTO: 174 K/UL
PMV BLD AUTO: 9.7 FL
POCT GLUCOSE: 139 MG/DL (ref 70–110)
POCT GLUCOSE: 139 MG/DL (ref 70–110)
POCT GLUCOSE: 160 MG/DL (ref 70–110)
POCT GLUCOSE: 94 MG/DL (ref 70–110)
POTASSIUM SERPL-SCNC: 3.2 MMOL/L
PROT SERPL-MCNC: 6.9 G/DL
RBC # BLD AUTO: 3.87 M/UL
SODIUM SERPL-SCNC: 140 MMOL/L
WBC # BLD AUTO: 4.64 K/UL

## 2019-02-26 PROCEDURE — 25000242 PHARM REV CODE 250 ALT 637 W/ HCPCS: Performed by: INTERNAL MEDICINE

## 2019-02-26 PROCEDURE — 80053 COMPREHEN METABOLIC PANEL: CPT

## 2019-02-26 PROCEDURE — 87206 SMEAR FLUORESCENT/ACID STAI: CPT

## 2019-02-26 PROCEDURE — 99900035 HC TECH TIME PER 15 MIN (STAT)

## 2019-02-26 PROCEDURE — 87205 SMEAR GRAM STAIN: CPT

## 2019-02-26 PROCEDURE — 94640 AIRWAY INHALATION TREATMENT: CPT

## 2019-02-26 PROCEDURE — 25000003 PHARM REV CODE 250: Performed by: PHYSICIAN ASSISTANT

## 2019-02-26 PROCEDURE — 25000003 PHARM REV CODE 250: Performed by: INTERNAL MEDICINE

## 2019-02-26 PROCEDURE — 99232 SBSQ HOSP IP/OBS MODERATE 35: CPT | Mod: ,,, | Performed by: INTERNAL MEDICINE

## 2019-02-26 PROCEDURE — 11000001 HC ACUTE MED/SURG PRIVATE ROOM

## 2019-02-26 PROCEDURE — 87116 MYCOBACTERIA CULTURE: CPT

## 2019-02-26 PROCEDURE — 36415 COLL VENOUS BLD VENIPUNCTURE: CPT

## 2019-02-26 PROCEDURE — 87015 SPECIMEN INFECT AGNT CONCNTJ: CPT

## 2019-02-26 PROCEDURE — 99223 1ST HOSP IP/OBS HIGH 75: CPT | Mod: GC,,, | Performed by: INTERNAL MEDICINE

## 2019-02-26 PROCEDURE — 99232 PR SUBSEQUENT HOSPITAL CARE,LEVL II: ICD-10-PCS | Mod: ,,, | Performed by: INTERNAL MEDICINE

## 2019-02-26 PROCEDURE — 84100 ASSAY OF PHOSPHORUS: CPT

## 2019-02-26 PROCEDURE — 94761 N-INVAS EAR/PLS OXIMETRY MLT: CPT

## 2019-02-26 PROCEDURE — 99223 PR INITIAL HOSPITAL CARE,LEVL III: ICD-10-PCS | Mod: GC,,, | Performed by: INTERNAL MEDICINE

## 2019-02-26 PROCEDURE — 87070 CULTURE OTHR SPECIMN AEROBIC: CPT

## 2019-02-26 PROCEDURE — 85025 COMPLETE CBC W/AUTO DIFF WBC: CPT

## 2019-02-26 PROCEDURE — 83735 ASSAY OF MAGNESIUM: CPT

## 2019-02-26 PROCEDURE — 97802 MEDICAL NUTRITION INDIV IN: CPT

## 2019-02-26 RX ORDER — POTASSIUM CHLORIDE 20 MEQ/1
20 TABLET, EXTENDED RELEASE ORAL ONCE
Status: COMPLETED | OUTPATIENT
Start: 2019-02-26 | End: 2019-02-26

## 2019-02-26 RX ADMIN — BENZONATATE 200 MG: 100 CAPSULE ORAL at 10:02

## 2019-02-26 RX ADMIN — OXYCODONE HYDROCHLORIDE AND ACETAMINOPHEN 1 TABLET: 10; 325 TABLET ORAL at 06:02

## 2019-02-26 RX ADMIN — FLUTICASONE FUROATE AND VILANTEROL TRIFENATATE 1 PUFF: 200; 25 POWDER RESPIRATORY (INHALATION) at 08:02

## 2019-02-26 RX ADMIN — POTASSIUM CHLORIDE 20 MEQ: 1500 TABLET, EXTENDED RELEASE ORAL at 10:02

## 2019-02-26 RX ADMIN — OXYCODONE HYDROCHLORIDE AND ACETAMINOPHEN 1 TABLET: 10; 325 TABLET ORAL at 04:02

## 2019-02-26 RX ADMIN — IPRATROPIUM BROMIDE AND ALBUTEROL SULFATE 3 ML: .5; 3 SOLUTION RESPIRATORY (INHALATION) at 07:02

## 2019-02-26 RX ADMIN — IPRATROPIUM BROMIDE AND ALBUTEROL SULFATE 3 ML: .5; 3 SOLUTION RESPIRATORY (INHALATION) at 08:02

## 2019-02-26 RX ADMIN — OXYCODONE HYDROCHLORIDE AND ACETAMINOPHEN 1 TABLET: 10; 325 TABLET ORAL at 11:02

## 2019-02-26 NOTE — PROGRESS NOTES
"[facility]  Adult Nutrition  Progress Note    SUMMARY       Recommendations    Recommendation: Continue 2800kcal DM diet with Boost Glucose Control tid    Goals:   1. Meet >85% EEN and EPN   2. Prevent wt loss of >2% per week   3. Promote nutrition related labs wnl    Nutrition Goal Status: new  Communication of RD Recs: discussed on rounds    Reason for Assessment    Reason For Assessment: identified at risk by screening criteria(MST)  Diagnosis: infection/sepsis(pneumonia)  Relevant Medical History: DM2, Tobacco abuse, Anxiety  Interdisciplinary Rounds: attended    General Information Comments: Pt reports good appetite currently but poor appetite PTA. No weight history in chart. Pt sates he lost about 30 pounds since last December, but previous chart reports state 30lb wt loss over last several years. Stated UBW 171lbs. 90% of breakfast eaten at time of visit. Nutrition focused physical exam performed. Pt has severe muscle depletion of the temples, moderate-severe muscle depletion of the shoulder and clavicle, moderate fat depletion of the orbitals and triceps. Pt appears much older than actual age.     Nutrition Discharge Planning: d/c on DM diet    Nutrition Risk Screen    Nutrition Risk Screen: no indicators present    Nutrition/Diet History    Patient Reported Diet/Restrictions/Preferences: general  Spiritual, Cultural Beliefs, Gnosticist Practices, Values that Affect Care: no    Anthropometrics    Temp: 98.1 °F (36.7 °C)  Height Method: Stated  Height: 5' 7" (170.2 cm)  Height (inches): 67 in  Weight Method: Bed Scale  Weight: 70 kg (154 lb 5.2 oz)  Weight (lb): 154.32 lb  Ideal Body Weight (IBW), Male: 148 lb  % Ideal Body Weight, Male (lb): 104.27 lb  BMI (Calculated): 24.2  BMI Grade: 25 - 29.9 - overweight  Usual Body Weight (UBW), k.7 kg  % Usual Body Weight: 90.28  % Weight Change From Usual Weight: -9.91 %     Lab/Procedures/Meds    Pertinent Labs: Reviewed  Lab Results "   Component Value Date     02/26/2019    K 3.2 (L) 02/26/2019     (H) 02/26/2019    CO2 21 (L) 02/26/2019    CALCIUM 8.5 (L) 02/26/2019    MG 1.5 (L) 02/26/2019    ALBUMIN 2.2 (L) 02/26/2019   *Corrected Ca 9.94    POCT Glucose   Date Value Ref Range Status   02/26/2019 139 (H) 70 - 110 mg/dL Final   02/26/2019 160 (H) 70 - 110 mg/dL Final   02/25/2019 159 (H) 70 - 110 mg/dL Final   02/25/2019 195 (H) 70 - 110 mg/dL Final   02/25/2019 127 (H) 70 - 110 mg/dL Final     Lab Results   Component Value Date    HGBA1C 5.5 02/25/2019     Pertinent Meds: Reviewed (none relevant)     Estimated/Assessed Needs    Weight Used For Calorie Calculations: 70 kg (154 lb 5.2 oz)  Energy Calorie Requirements (kcal): 6193-8707  Energy Need Method: Kcal/kg(30-35 kcal/kg)  Protein Requirements: 84-98 gm/d (1.2-1.4 gm/kg)  Weight Used For Protein Calculations: 70 kg (154 lb 5.2 oz)  Fluid Requirements (mL): 2100(or per team)  Estimated Fluid Requirement Method: RDA Method  RDA Method (mL): 2100  CHO Requirement: 50%    Nutrition Prescription Ordered    Current Diet Order: 2800kcal DM  Oral Nutrition Supplement: Boost Glucose Control tid    Evaluation of Received Nutrient/Fluid Intake    % Intake of Estimated Energy Needs: 75 - 100 %  % Meal Intake: 75 - 100 %    Nutrition Risk    Level of Risk/Frequency of Follow-up: low     Assessment and Plan    Moderate malnutrition    Malnutrition in the context of Chronic Illness/Injury    Related to (etiology):  Poor appetite PTA  Signs and Symptoms (as evidenced by):  Pt stated weight loss and poor appetite PTA, severe muscle depletion of the temples, moderate-severe muscle depletion of the shoulder and clavicle, moderate fat depletion of the orbitals and triceps (NFPE completed 2/26)  Interventions:  Collaboration with Providers   Nutrition Diagnosis Status:  New       Monitor and Evaluation    Food and Nutrient Intake: energy intake, food and beverage intake  Food and Nutrient  Adminstration: diet order  Physical Activity and Function: nutrition-related ADLs and IADLs  Anthropometric Measurements: weight, weight change  Biochemical Data, Medical Tests and Procedures: electrolyte and renal panel, gastrointestinal profile, glucose/endocrine profile, inflammatory profile, lipid profile  Nutrition-Focused Physical Findings: overall appearance, extremities, muscles and bones, skin     Malnutrition Assessment    Orbital Region (Subcutaneous Fat Loss): moderate depletion  Upper Arm Region (Subcutaneous Fat Loss): moderate depletion   Latter day Region (Muscle Loss): severe depletion  Clavicle Bone Region (Muscle Loss): moderate depletion  Clavicle and Acromion Bone Region (Muscle Loss): moderate depletion  Patellar Region (Muscle Loss): well nourished  Anterior Thigh Region (Muscle Loss): well nourished  Posterior Calf Region (Muscle Loss): well nourished     Nutrition Follow-Up    RD Follow-up?: Yes    Dennise Gonzales, MS, RD, LDN   Dietitian, [facility]  [phone]

## 2019-02-26 NOTE — PROGRESS NOTES
"[facility] Hospital Medicine  Progress Note    Patient Name: Josiah Richter  MRN: [id number]  Patient Class: IP- Inpatient   Admission Date: 2/25/2019  Length of Stay: 1 days  Attending Physician: Gabby Aaron MD  Primary Care Provider: Lynsey Bradford MD        Subjective:     Principal Problem:Pneumonia    HPI:  Mr. Richter is a 63/M with PMH DMII, tobacco abuse who presented to ED from Urgent Care 02/25 with a 4-5 week history of persistent cough and shortness of breath. He states that symptoms began multiple weeks ago initially with cough; it has mostly been nonproductive. He denies fevers, but attests to intermittent chills; no night sweats or rigors. He has been seen in the ED at [facility] as well as at Urgent Care multiple times in the last month. He has been prescribed azithromycin (which was reportedly taken incorrectly from prior ED notes), cefdinir, received IM ceftriaxone, and doxycycline which he has taken; he was additionally prescribed moxifloxacin PO but was unable to pick it up due to cost. He has been prescribed multiple inhalers which he has depleted several times over the past several weeks due to frequency of use with his SOB.    Of note, he reports no prior incarceration but has been homeless in the past. He likewise reports since moving to [place] from [place] in the last 10 months that he has lost >30lb. Denies significant productive cough or blood-tinged mucus. Quit smoking approx. 02/21 after being counseled to quit during his ED visits.    Hospital Course:  Patient was admitted for evaluation of weight loss and cough. Chest CT showed multifocal airspace opacities with cavitary lesion in RUL with R parapneumonic effusion. Pulmonology was consulted for further evaluation.     Interval History:  Pt seen and examined at bedside. He complains of central chest pain with deep inspiration, sharp and "feels like a knife". Improved with percocet. Denies fever, " chills. Has nonproductive cough.    Discussed POC with SW and CM.      Review of Systems   Constitutional: Negative for chills and fever.   Respiratory: Positive for cough and shortness of breath.    Cardiovascular: Positive for chest pain.     Objective:     Vital Signs (Most Recent):  Temp: 98.2 °F (36.8 °C) (02/26/19 0447)  Pulse: 75 (02/26/19 0816)  Resp: 18 (02/26/19 0816)  BP: (!) 93/54 (02/26/19 0812)  SpO2: 95 % (02/26/19 0816) Vital Signs (24h Range):  Temp:  [97.2 °F (36.2 °C)-98.6 °F (37 °C)] 98.2 °F (36.8 °C)  Pulse:  [62-84] 75  Resp:  [16-20] 18  SpO2:  [95 %-100 %] 95 %  BP: ()/(50-75) 93/54     Weight: 70 kg (154 lb 5.2 oz)  Body mass index is 24.17 kg/m².  No intake or output data in the 24 hours ending 02/26/19 0956   Physical Exam   Constitutional: He is oriented to person, place, and time. He appears well-developed and well-nourished.   Cardiovascular: Normal rate, regular rhythm and normal heart sounds.   Pulmonary/Chest: Effort normal. He has rales (mild, right sided).   Abdominal: Soft. Bowel sounds are normal. He exhibits no distension. There is no tenderness.   Musculoskeletal: Normal range of motion. He exhibits no edema.   Neurological: He is alert and oriented to person, place, and time.   Skin: Skin is warm and dry.   Psychiatric: He has a normal mood and affect. His behavior is normal.   Nursing note and vitals reviewed.      Significant Labs:   BMP:   Recent Labs   Lab 02/26/19  0545   GLU 93      K 3.2*   *   CO2 21*   BUN 12   CREATININE 0.9   CALCIUM 8.5*   MG 1.5*     CBC:   Recent Labs   Lab 02/25/19  1452 02/26/19  0545   WBC 6.52 4.64   HGB 12.3* 12.2*   HCT 36.2* 36.4*    174     All pertinent labs within the past 24 hours have been reviewed.    Significant Imaging: I have reviewed all pertinent imaging results/findings within the past 24 hours.   Imaging Results          CT Chest With Contrast (Final result)  Result time 02/25/19 18:07:16    Final  result by Sam Mccarthy MD (02/25/19 18:07:16)                 Impression:      Interval development of multifocal airspace opacities in the right hemithorax with a cavitary lesion in the right upper lobe and new right-sided small parapneumonic effusion.  Follow-up to resolution is suggested.    Additional findings as above.      Electronically signed by: Sam Mccarthy MD  Date:    02/25/2019  Time:    18:07             Narrative:    EXAMINATION:  CT CHEST WITH CONTRAST    CLINICAL HISTORY:  Chronic cough / SOB with failed outpt. abx x 2, eval for consolidation / changes in prior noted nodule;    TECHNIQUE:  Low dose axial images, sagittal and coronal reformations were obtained from the thoracic inlet to the lung bases following the IV administration of 75 mL of Omnipaque 350.    COMPARISON:  Chest x-ray dated 02/25/2019 and CT scan of the chest dated 01/15/2019.    FINDINGS:  The bases of the neck is within normal limits.  The supraclavicular regions are unremarkable.  Thyroid gland is within normal limits.    The trachea is unremarkable.  The central airways appear patent.  No definitive endobronchial lesion is identified.  There is no evidence of bronchiectasis.    The heart is unremarkable.  There are no pericardial effusions.  There are coronary artery calcifications.    The thoracic aorta is normal in caliber.  There is no intimal flap to suggest dissection.  There are scattered calcifications along the course of the thoracic aorta.  No filling defects are identified within the pulmonary tree.    There is no evidence of lymphadenopathy in the chest.  The axillary regions are unremarkable.    There is a new small right-sided pleural effusion.  No pleural effusion identified on the left.  There is no evidence of a pneumothorax.  There is no evidence of pneumomediastinum.    There is a cavitary opacity in the anterior segment of the right upper lobe.  New airspace opacities are identified within the right middle  and right lower lobes.  There are scattered tree-in-bud opacifications in the right middle lobe.  The left lung remains relatively clear.  Previous 4 mm nodule in the left upper lobe is no longer visualized.    The esophagus is unremarkable.  The visualized upper abdominal structures are within normal limits.  There is no evidence of free air in the upper abdomen.    The chest wall is unremarkable.  There are postoperative changes in the lower cervical spine.  There are degenerative changes in the osseous structures.  There is no evidence of a fracture.                                  Assessment/Plan:      * Pneumonia    Chest CT with right sided opacities with cavitary lesions, concern for mycobacterial infection vs. TB given chronicity, weight loss, risk factors.  Unable to obtain AFB and respiratory culture.  Awaiting urine legionella   Hold further empiric antibiotics given SIRS 0/4 and wide spectrum of prior antibiotics; reinitiate with pip-tazo, vancomycin as empiric coverage if febrile overnight.  Pulmonology consulted for further evaluation / management recommendations.     Malnutrition of moderate degree    Nutrition consult, dietary supplementation with >30lb weight loss over last several years.     Chronic hepatitis C     h/o HepC without reported prior treatment.  Will need outpt. follow-up with hepatology.     Tobacco abuse    Cessation counseling provided  Declined nicotine patch.     COPD (chronic obstructive pulmonary disease)    Suspected, given 40+ pack-year history, findings noted on initial CT. Recently started on Breo.  Continue fluticasone-vilanterol 200-25mcg inhaled daily, albuterol-ipratropium 2.5-0.5mg inhaled q4hr PRN.     Type 2 diabetes mellitus, without long-term current use of insulin    Hold home antihyperglycemics   Continue low-dose sliding scale insulin aspart 0-5U subQ TIDWM PRN.       VTE Risk Mitigation (From admission, onward)        Ordered     IP VTE LOW RISK PATIENT  Once       02/25/19 1806     Place sequential compression device  Until discontinued      02/25/19 1806              Gabby Aaron MD  Department of Hospital Medicine   [facility]

## 2019-02-26 NOTE — PLAN OF CARE
Problem: Malnutrition  Goal: Improved Nutritional Intake    Intervention: Promote and Optimize Oral Intake  Recommendation: Continue 2800kcal DM diet with Boost Glucose Control tid     Goals:   1. Meet >85% EEN and EPN   2. Prevent wt loss of >2% per week   3. Promote nutrition related labs wnl

## 2019-02-26 NOTE — ASSESSMENT & PLAN NOTE
Chest CT with right sided opacities with cavitary lesions, concern for mycobacterial infection vs. TB given chronicity, weight loss, risk factors.  Unable to obtain AFB and respiratory culture.  Awaiting urine legionella   Hold further empiric antibiotics given SIRS 0/4 and wide spectrum of prior antibiotics; reinitiate with pip-tazo, vancomycin as empiric coverage if febrile overnight.  Pulmonology consulted for further evaluation / management recommendations.

## 2019-02-26 NOTE — ASSESSMENT & PLAN NOTE
Malnutrition in the context of Chronic Illness/Injury    Related to (etiology):  Poor appetite PTA  Signs and Symptoms (as evidenced by):  Pt stated weight loss and poor appetite PTA, severe muscle depletion of the temples, moderate-severe muscle depletion of the shoulder and clavicle, moderate fat depletion of the orbitals and triceps (NFPE completed 2/26)  Interventions:  Collaboration with Providers   Nutrition Diagnosis Status:  New

## 2019-02-26 NOTE — CONSULTS
Consult Note  Pulmonary Medicine    SUBJECTIVE     Reason for consult: pneumonia    History of Present Illness  [facility] is consulted for pneumonia in this 63-yo male admitted via ED yesterday after he presented with persistent SOB and dry cough. Both symptoms started about a month ago, and he has had at least four urgent presentations for a diagnosis of pneumonia with suspected COPD. He has been prescribed ABX each time - azithromycin, doxy, moxi, and also received IM Rocephin and cefdinir. He has not been able to complete a full course due to poor compliance or cost. His symptoms persist, and recently have been accompanied by right substernal chest pain that wakes him from sleep. He also endorses a 30 lbs weight loss in the last few months, despite having a steady appetite. Pt denies n/v/f, sputum, hemoptysis, sick contacts. He has never been to [facility], but was homeless for about a month a couple years back. He denies TB contacts. He was a lifelong heavy smoker until he quit a week ago.       Past Medical History:   Diagnosis Date    Anxiety     Diabetes mellitus 2017    Diabetes mellitus, type 2     Kidney stone     Urinary tract infection        Past Surgical History:   Procedure Laterality Date    KIDNEY STONE SURGERY      NECK SURGERY      secondary to previous fall       Family History   Problem Relation Age of Onset    Hypertension Mother     Diabetes Mother     Stroke Mother     Prostate cancer Neg Hx     Kidney disease Neg Hx        Social History     Socioeconomic History    Marital status:      Spouse name: None    Number of children: None    Years of education: None    Highest education level: None   Social Needs    Financial resource strain: None    Food insecurity - worry: None    Food insecurity - inability: None    Transportation needs - medical: None    Transportation needs - non-medical: None   Occupational History    None   Tobacco Use    Smoking status: Former Smoker      Packs/day: 1.50     Years: 40.00     Pack years: 60.00     Last attempt to quit: 2019     Years since quittin.0    Smokeless tobacco: Never Used   Substance and Sexual Activity    Alcohol use: No    Drug use: Yes     Types: Oxycodone    Sexual activity: Not Currently   Other Topics Concern    None   Social History Narrative    None       Current Facility-Administered Medications   Medication Dose Route Frequency Provider Last Rate Last Dose    albuterol-ipratropium 2.5 mg-0.5 mg/3 mL nebulizer solution 3 mL  3 mL Nebulization Q4H PRN JENNIFER Grimm MD   3 mL at 19 0816    benzonatate capsule 200 mg  200 mg Oral TID PRN JENNIFER Grimm MD   200 mg at 19 1010    dextrose 50% injection 12.5 g  12.5 g Intravenous PRN JENNIFER Grimm MD        dextrose 50% injection 25 g  25 g Intravenous PRN JENNIFER Grimm MD        fluticasone-vilanterol 200-25 mcg/dose diskus inhaler 1 puff  1 puff Inhalation Daily JENNIFER Grimm MD   1 puff at 19 0818    glucagon (human recombinant) injection 1 mg  1 mg Intramuscular PRN JENNIFER Grimm MD        glucose chewable tablet 16 g  16 g Oral PRN JENNIFER Grimm MD        glucose chewable tablet 24 g  24 g Oral PRN JENNIFER Grimm MD        insulin aspart U-100 pen 0-5 Units  0-5 Units Subcutaneous QID (AC + HS) PRN JENNIFER Grimm MD        oxyCODONE-acetaminophen  mg per tablet 1 tablet  1 tablet Oral Q6H PRN Iliana Morton PA-C   1 tablet at 19 1142    sodium chloride 0.9% flush 5 mL  5 mL Intravenous PRN JENNIFER Grimm MD           Review of patient's allergies indicates:  No Known Allergies      Review of Systems  As above. Additionally:   Constitutional: Positive for chills, fatigue and unexpected weight change. Negative for fever.   HENT: Negative for sore throat and trouble swallowing.    Eyes: Negative for pain and visual disturbance.   Respiratory: Positive for dry cough and shortness of breath.     Cardiovascular: Negative for chest pain and palpitations.   Gastrointestinal: Negative for abdominal pain, nausea and vomiting.   Genitourinary: Negative for difficulty urinating and dysuria.   Musculoskeletal: Negative for arthralgias and joint swelling.   Skin: Negative for rash and wound.   Neurological: Negative for weakness and numbness.           OBJECTIVE     Vitals:    02/26/19 1211   BP: (!) 111/58   Pulse: 70   Resp: 18   Temp: 98.1 °F (36.7 °C)       Physical Exam  Constitutional: Patient is oriented to person, place, and time. Patient appears chronically ill, thin, and sickly, but not toxic. Cobblestone appearance to face.   HENT: Head: Normocephalic and atraumatic. Right Ear: External ear normal. Left Ear: External ear normal. Nose: Nose normal.   Eyes: Conjunctivae and EOM are normal. Right eye exhibits no discharge. Left eye exhibits no discharge. No scleral icterus.   Neck: Normal range of motion. Neck supple. No JVD present.   Cardiovascular: Normal rate, regular rhythm, normal heart sounds and intact distal pulses.  Exam reveals no gallop and no friction rub.  No murmur heard.  Pulmonary/Chest: No distress. Effort normal and breath sounds normal. Patient has no wheezes. Patient has rales over right middle lung field.   Abdominal: Soft. Bowel sounds are normal. Patient exhibits no distension and no mass. There is no tenderness.    Neurological: Patient is alert and oriented to person, place, and time.   Skin: Skin is warm and dry. No rash noted. Patient is not diaphoretic. No erythema. No pallor.   Psychiatric: Patient has a normal mood and affect. Behavior is normal. Judgment and thought content normal.       Laboratory  No results for input(s): INR in the last 168 hours.    Recent Labs   Lab 02/25/19  1452 02/26/19  0545   WBC 6.52 4.64   HGB 12.3* 12.2*   HCT 36.2* 36.4*    174       Recent Labs   Lab 02/25/19  1452 02/26/19  0545    140   K 4.1 3.2*    112*   CO2 21* 21*    BUN 15 12   CREATININE 1.0 0.9   CALCIUM 8.8 8.5*   PROT 7.7 6.9   BILITOT 0.4 0.5   ALKPHOS 71 65   ALT 35 30   AST 48* 34     No results for input(s): PH, PCO2, PO2, HCO3, POCSATURATED, BE in the last 24 hours.      Diagnostic Results  CT shows RML consolidative opacity without evident obstruction proximally. Cavitation is read on radiology report, but this may simply be artifact or slice-angled opacification.         Assessment / Recommendations     * Pneumonia due to infectious organism     - multiple imaging studies show consolidative opacification in RML  - DDx includes infection - bacterial, fungal, atypical, mycobacterial (TB vs NTM), less likely virus - as well as malignancy,other  - the relatively fast increase in size suggests infection more than malignancy  - less likely to be TB in absence of copious putum/blood  - no discrete mass on imaging  - however, his weight loss is concerning for malignancy or MTB/NTM  - his recent history of antibiotics suggests he has not been able to complete a full course, therefore this is incompletely treated CAP as opposed to HAP  - recommend stopping BSABX and transitioning to oral with coverage of atypical organisms and Pseudomonas (eg resp fluoroquinolone)  - follow up urine antigen studies, resp Cx, AFB  - complete 7-day course    - repeat CXR in 4 weeks      COPD     - diagnosis not substantiated by PFTs but suspected based on tobacco history  - reasonable to continue symptomatic care including bronchodilators prn  - steroids not indicated currently      History of tobacco use     - Pt says he quit a week ago; congratulated  - please refer to Pulmonology clinic to establish care        Thank you for this consult.     Ankush Cervantes MD  Fellow, Pulmonary & Critical Care Medicine

## 2019-02-26 NOTE — PLAN OF CARE
10:50 AM    delivered to Lab per ELI Leiva - RT awaiting the sample. Educated and provided supplies for Urine sample to detect legionella antigen.    6:31 PM   TB/airborne precautions maintained and frequently provided education RT infection control..  VSS on RA and afebrile this shift.  Tolerating pain per home percocet. Great appetite and minimal UOP this shift, Reminded to use Urinal and need to send a sample to lab, supplies provided for use.  Repositions self independently in bed and ambulating around room x1.  Sugars monitored  Free from injury or skin breakdown; Fall precautions maintained and call light in reach.  POC updated questions answered and comments acknowledged.  Purposeful hourly rounding completed this shift.

## 2019-02-26 NOTE — NURSING
Pt arrived to floor via w/c per transport, transferred to bed. IVF started,  oriented to room, call light placed within reach, bed low and locked, and family at bedside. Pt complains of pain5 /10.No acute distress noted at this time. Will continue to monitor.

## 2019-02-26 NOTE — HOSPITAL COURSE
Patient was admitted for evaluation of weight loss and cough. Chest CT showed multifocal airspace opacities with cavitary lesion in RUL with R parapneumonic effusion. Pulmonology was consulted for further evaluation and did not feel that the pulmonary infiltrates were cavitary. He was started on empiric anti-pseudomonal antibiotics and had improvement in symptoms. Sputum did not show AFB. Since he had improvement he was able to discharge home on oral Levofloxacin. He was instructed to follow-up with PCP for repeat CXR in 6 weeks. Also discussed plan of care with his son over the phone.

## 2019-02-26 NOTE — ASSESSMENT & PLAN NOTE
- ?RLL PNA on CXR with failed outpatient treatment prior; concern for mycobacterial infection vs. TB given chronicity, weight loss, risk factors.  - Attempt to obtain AFB and respiratory culture, urine legionella to better characterize infectious process.  - CT Chest W Contrast to further evaluate findings noted on prior CT, CXR; nodule noted prior with small size, ?malignancy as feasible as well given weight loss.  - Hold further empiric antibiotics given SIRS 0/4 and wide spectrum of prior antibiotics; reinitiate with pip-tazo, vancomycin as empiric coverage if febrile overnight.  - Consult pulmonology for further evaluation / management recommendations.

## 2019-02-26 NOTE — ASSESSMENT & PLAN NOTE
Hold home antihyperglycemics   Continue low-dose sliding scale insulin aspart 0-5U subQ TIDWM PRN.

## 2019-02-26 NOTE — PLAN OF CARE
SW met with pt at bedside to complete disharge assessment, verified PCP and uses [facility] on [address].  Pt has glucometer.  DaughterNadia will provide transportation home. Pt needs strips and lancets.       02/26/19 1142   Discharge Assessment   Assessment Type Discharge Planning Assessment   Confirmed/corrected address and phone number on facesheet? Yes   Assessment information obtained from? Patient   Communicated expected length of stay with patient/caregiver no   Prior to hospitilization cognitive status: Alert/Oriented   Prior to hospitalization functional status: Independent   Current cognitive status: Alert/Oriented   Current Functional Status: Independent   Lives With alone   Able to Return to Prior Arrangements yes   Readmission Within the Last 30 Days no previous admission in last 30 days   Patient currently being followed by outpatient case management? No   Patient currently receives any other outside agency services? No   Equipment Currently Used at Home glucometer   Do you have any problems affording any of your prescribed medications? No   Is the patient taking medications as prescribed? yes   Does the patient have transportation home? Yes   Transportation Anticipated family or friend will provide   Does the patient receive services at the Coumadin Clinic? No   Discharge Plan A Home   DME Needed Upon Discharge  none   Patient/Family in Agreement with Plan yes

## 2019-02-26 NOTE — ASSESSMENT & PLAN NOTE
- Suspect COPD given 40+ pack-year history, findings noted on initial CT. Recently started on Breo.  - Continue fluticasone-vilanterol 200-25mcg inhaled daily, albuterol-ipratropium 2.5-0.5mg inhaled q4hr PRN.

## 2019-02-26 NOTE — PROGRESS NOTES
Pt received on RA;SPO2 normal. PRN treatment was given this AM along with DPI;pt tolerated it well. Sputum sample was sent down to lab. No changes made. Will continue to monitor.

## 2019-02-26 NOTE — PLAN OF CARE
Problem: Adult Inpatient Plan of Care  Goal: Plan of Care Review  Outcome: Ongoing (interventions implemented as appropriate)   02/26/19 0622   Plan of Care Review   Plan of Care Reviewed With patient   Progress improving   POC reviewed with pt. Pt remains free of falls and injury this shift. Pt complain of chest and back pain relieved by PRN pain medication. No other pain or discomfort noted or voiced at this time. Continue airborne isolation. Call light in reach, bed in lowest position. Will continue to monitor.

## 2019-02-26 NOTE — ASSESSMENT & PLAN NOTE
Suspected, given 40+ pack-year history, findings noted on initial CT. Recently started on Breo.  Continue fluticasone-vilanterol 200-25mcg inhaled daily, albuterol-ipratropium 2.5-0.5mg inhaled q4hr PRN.

## 2019-02-26 NOTE — ASSESSMENT & PLAN NOTE
- Hold home antihyperglycemics and start low-dose sliding scale insulin aspart 0-5U subQ TIDWM PRN.

## 2019-02-26 NOTE — H&P
[facility] Hospital Medicine  History & Physical    Patient Name: Josiah Richter  MRN: [id number]  Admission Date: 2/25/2019  Attending Physician: MARYJANE Grimm MD  Primary Care Provider: Lynsey Bradford MD    Patient information was obtained from patient, past medical records and ER records.     Subjective:     Principal Problem:Pneumonia    Chief Complaint:   Chief Complaint   Patient presents with    Cough     Cough x several weeks. Pt sent from  for evaluation of pneumonia diagnosed with an XRAY done PTA.         HPI: Mr. Richter is a 63/M with PMH DMII, tobacco abuse who presented to ED from Urgent Care 02/25 with a 4-5 week history of persistent cough and shortness of breath. He states that symptoms began multiple weeks ago initially with cough; it has mostly been nonproductive. He denies fevers, but attests to intermittent chills; no night sweats or rigors. He has been seen in the ED at [facility] and [facility] as well as at Urgent Care multiple times in the last month. He has been prescribed azithromycin (which was reportedly taken incorrectly from prior ED notes), cefdinir, received IM ceftriaxone, and doxycycline which he has taken; he was additionally prescribed moxifloxacin PO but was unable to pick it up due to cost. He has been prescribed multiple inhalers which he has depleted several times over the past several weeks due to frequency of use with his SOB.    Of note, he reports no prior incarceration but has been homeless in the past. He likewise reports since moving to [place] from [place] in the last 10 months that he has lost >30lb. Denies significant productive cough or blood-tinged mucus. Quit smoking approx. 02/21 after being counseled to quit during his ED visits.    Past Medical History:   Diagnosis Date    Anxiety     Diabetes mellitus 2017    Diabetes mellitus, type 2     Kidney stone     Urinary tract infection      Past Surgical History:   Procedure  Laterality Date    KIDNEY STONE SURGERY      NECK SURGERY      secondary to previous fall     Review of patient's allergies indicates:  No Known Allergies    Current Facility-Administered Medications on File Prior to Encounter   Medication    [COMPLETED] albuterol nebulizer solution 2.5 mg    [COMPLETED] ipratropium 0.02 % nebulizer solution 0.5 mg     Current Outpatient Medications on File Prior to Encounter   Medication Sig    albuterol (PROVENTIL/VENTOLIN HFA) 90 mcg/actuation inhaler Inhale 1-2 puffs into the lungs every 6 (six) hours as needed for Wheezing. Rescue    azithromycin (Z-GHULAM) 250 MG tablet Take 2 tablets by mouth on day 1; Take 1 tablet by mouth on days 2-5    fluticasone-vilanterol (BREO) 200-25 mcg/dose DsDv diskus inhaler Inhale 1 puff into the lungs once daily. Controller    ibuprofen (ADVIL,MOTRIN) 800 MG tablet Take 1 tablet (800 mg total) by mouth every 6 (six) hours as needed for Pain.    ipratropium (ATROVENT) 42 mcg (0.06 %) nasal spray 2 sprays by Nasal route 4 (four) times daily.    methylPREDNISolone (MEDROL DOSEPACK) 4 mg tablet use as directed    metFORMIN (GLUCOPHAGE) 500 MG tablet Take 1 tablet (500 mg total) by mouth 2 (two) times daily with meals.    multivitamin-Ca-iron-minerals (MULTIVITAMIN-CALCIUM AND IRON) Tab Take 1 tablet by mouth once daily.    ondansetron (ZOFRAN-ODT) 4 MG TbDL     oxyCODONE-acetaminophen (PERCOCET)  mg per tablet      Family History     Problem Relation (Age of Onset)    Diabetes Mother    Hypertension Mother    Stroke Mother        Tobacco Use    Smoking status: Former Smoker     Packs/day: 1.50     Years: 40.00     Pack years: 60.00     Last attempt to quit: 2019     Years since quittin.0    Smokeless tobacco: Never Used   Substance and Sexual Activity    Alcohol use: No    Drug use: Yes     Types: Oxycodone    Sexual activity: Not Currently     Review of Systems   Constitutional: Positive for chills, fatigue and  unexpected weight change. Negative for fever.   HENT: Negative for sore throat and trouble swallowing.    Eyes: Negative for pain and visual disturbance.   Respiratory: Positive for cough and shortness of breath.    Cardiovascular: Negative for chest pain and palpitations.   Gastrointestinal: Negative for abdominal pain, nausea and vomiting.   Genitourinary: Negative for difficulty urinating and dysuria.   Musculoskeletal: Negative for arthralgias and joint swelling.   Skin: Negative for rash and wound.   Neurological: Negative for weakness and numbness.     Objective:     Vital Signs (Most Recent):  Temp: 98.3 °F (36.8 °C) (02/25/19 1654)  Pulse: 72 (02/25/19 1655)  Resp: 18 (02/25/19 1652)  BP: 102/63 (02/25/19 1655)  SpO2: 99 % (02/25/19 1655) Vital Signs (24h Range):  Temp:  [97.2 °F (36.2 °C)-98.3 °F (36.8 °C)] 98.3 °F (36.8 °C)  Pulse:  [66-87] 72  Resp:  [16-20] 18  SpO2:  [98 %-99 %] 99 %  BP: ()/(54-75) 102/63     Weight: 68.5 kg (151 lb)  Body mass index is 23.65 kg/m².    Physical Exam   Constitutional: He is oriented to person, place, and time. He appears well-developed. No distress.   Cachectic.   HENT:   Head: Normocephalic and atraumatic.   Eyes: Conjunctivae and EOM are normal.   Cardiovascular: Normal rate, regular rhythm, S1 normal, S2 normal and intact distal pulses.   Pulmonary/Chest: Effort normal.   Mild crackles R base.   Abdominal: Soft. He exhibits no distension. There is no tenderness.   Musculoskeletal: Normal range of motion. He exhibits no edema.   Neurological: He is alert and oriented to person, place, and time.   Skin: Skin is warm and dry.   Nursing note and vitals reviewed.     Significant Labs:   Recent Results (from the past 24 hour(s))   POCT glucose    Collection Time: 02/25/19  2:45 PM   Result Value Ref Range    POCT Glucose 127 (H) 70 - 110 mg/dL   Comprehensive metabolic panel    Collection Time: 02/25/19  2:52 PM   Result Value Ref Range    Sodium 140 136 - 145 mmol/L     Potassium 4.1 3.5 - 5.1 mmol/L    Chloride 110 95 - 110 mmol/L    CO2 21 (L) 23 - 29 mmol/L    Glucose 126 (H) 70 - 110 mg/dL    BUN, Bld 15 8 - 23 mg/dL    Creatinine 1.0 0.5 - 1.4 mg/dL    Calcium 8.8 8.7 - 10.5 mg/dL    Total Protein 7.7 6.0 - 8.4 g/dL    Albumin 2.4 (L) 3.5 - 5.2 g/dL    Total Bilirubin 0.4 0.1 - 1.0 mg/dL    Alkaline Phosphatase 71 55 - 135 U/L    AST 48 (H) 10 - 40 U/L    ALT 35 10 - 44 U/L    Anion Gap 9 8 - 16 mmol/L    eGFR if African American >60 >60 mL/min/1.73 m^2    eGFR if non African American >60 >60 mL/min/1.73 m^2   CBC auto differential    Collection Time: 02/25/19  2:52 PM   Result Value Ref Range    WBC 6.52 3.90 - 12.70 K/uL    RBC 3.83 (L) 4.60 - 6.20 M/uL    Hemoglobin 12.3 (L) 14.0 - 18.0 g/dL    Hematocrit 36.2 (L) 40.0 - 54.0 %    MCV 95 82 - 98 fL    MCH 32.1 (H) 27.0 - 31.0 pg    MCHC 34.0 32.0 - 36.0 g/dL    RDW 14.1 11.5 - 14.5 %    Platelets 189 150 - 350 K/uL    MPV 10.3 9.2 - 12.9 fL    Gran # (ANC) 3.8 1.8 - 7.7 K/uL    Lymph # 1.7 1.0 - 4.8 K/uL    Mono # 0.6 0.3 - 1.0 K/uL    Eos # 0.3 0.0 - 0.5 K/uL    Baso # 0.05 0.00 - 0.20 K/uL    Gran% 58.9 38.0 - 73.0 %    Lymph% 26.2 18.0 - 48.0 %    Mono% 9.2 4.0 - 15.0 %    Eosinophil% 4.6 0.0 - 8.0 %    Basophil% 0.8 0.0 - 1.9 %    Differential Method Automated    Lactic acid, plasma    Collection Time: 02/25/19  2:52 PM   Result Value Ref Range    Lactate (Lactic Acid) 1.9 0.5 - 2.2 mmol/L     Significant Imaging:   CT Chest WO Contrast 01/15/19:  No CT evidence of acute intrathoracic abnormality. Small volume of layering dependent material within the distal trachea may relate to secretions or aspiration. Clinical correlation is advised. 0.5 cm pulmonary nodule within the left upper lobe.  For a solid nodule <6 mm, Fleischner Society 2017 guidelines recommend no routine follow up for a low risk patient, or follow-up with non-contrast chest CT at 12 months in a high risk patient. Mild centrilobular emphysematous  change of the lungs. Scattered coronary artery calcifications.    CXR 02/25/19:  Increasing opacification at the right lower lung zone with interval development of blunting of the right costophrenic angle.  Findings likely represent worsening right middle lobe pneumonia along with right pleural effusion with associated volume loss/consolidation in the right lower lobe.    Assessment/Plan:     * Pneumonia    - ?RLL PNA on CXR with failed outpatient treatment prior; concern for mycobacterial infection vs. TB given chronicity, weight loss, risk factors.  - Attempt to obtain AFB and respiratory culture, urine legionella to better characterize infectious process.  - CT Chest W Contrast to further evaluate findings noted on prior CT, CXR.  - Hold further empiric antibiotics given SIRS 0/4 and wide spectrum of prior antibiotics; reinitiate with pip-tazo, vancomycin as empiric coverage if febrile overnight.  - Consult pulmonology for further evaluation / management recommendations.     Malnutrition of moderate degree    - Nutrition consult, dietary supplementation with >30lb weight loss over last several years.     Chronic hepatitis C    - h/o HepC without reported prior treatment.  - Will need outpt. follow-up with hepatology.     Tobacco abuse    - Cessation counseling.  - Declined nicotine patch.     COPD (chronic obstructive pulmonary disease)    - Suspect COPD given 40+ pack-year history, findings noted on initial CT. Recently started on Breo.  - Continue fluticasone-vilanterol 200-25mcg inhaled daily, albuterol-ipratropium 2.5-0.5mg inhaled q4hr PRN.     Type 2 diabetes mellitus, without long-term current use of insulin    - Hold home antihyperglycemics and start low-dose sliding scale insulin aspart 0-5U subQ TIDWM PRN.       VTE Risk Mitigation (From admission, onward)        Ordered     IP VTE LOW RISK PATIENT  Once      02/25/19 1806     Place sequential compression device  Until discontinued      02/25/19 1806         Tobacco cessation counselin minutes.    MARYJANE Grimm MD  Department of Hospital Medicine   [facility]

## 2019-02-26 NOTE — SUBJECTIVE & OBJECTIVE
"Interval History:  Pt seen and examined at bedside. He complains of central chest pain with deep inspiration, sharp and "feels like a knife". Improved with percocet. Denies fever, chills. Has nonproductive cough.    Discussed POC with SW and CM.      Review of Systems   Constitutional: Negative for chills and fever.   Respiratory: Positive for cough and shortness of breath.    Cardiovascular: Positive for chest pain.     Objective:     Vital Signs (Most Recent):  Temp: 98.2 °F (36.8 °C) (02/26/19 0447)  Pulse: 75 (02/26/19 0816)  Resp: 18 (02/26/19 0816)  BP: (!) 93/54 (02/26/19 0812)  SpO2: 95 % (02/26/19 0816) Vital Signs (24h Range):  Temp:  [97.2 °F (36.2 °C)-98.6 °F (37 °C)] 98.2 °F (36.8 °C)  Pulse:  [62-84] 75  Resp:  [16-20] 18  SpO2:  [95 %-100 %] 95 %  BP: ()/(50-75) 93/54     Weight: 70 kg (154 lb 5.2 oz)  Body mass index is 24.17 kg/m².  No intake or output data in the 24 hours ending 02/26/19 0956   Physical Exam   Constitutional: He is oriented to person, place, and time. He appears well-developed and well-nourished.   Cardiovascular: Normal rate, regular rhythm and normal heart sounds.   Pulmonary/Chest: Effort normal. He has rales (mild, right sided).   Abdominal: Soft. Bowel sounds are normal. He exhibits no distension. There is no tenderness.   Musculoskeletal: Normal range of motion. He exhibits no edema.   Neurological: He is alert and oriented to person, place, and time.   Skin: Skin is warm and dry.   Psychiatric: He has a normal mood and affect. His behavior is normal.   Nursing note and vitals reviewed.      Significant Labs:   BMP:   Recent Labs   Lab 02/26/19  0545   GLU 93      K 3.2*   *   CO2 21*   BUN 12   CREATININE 0.9   CALCIUM 8.5*   MG 1.5*     CBC:   Recent Labs   Lab 02/25/19  1452 02/26/19  0545   WBC 6.52 4.64   HGB 12.3* 12.2*   HCT 36.2* 36.4*    174     All pertinent labs within the past 24 hours have been reviewed.    Significant Imaging: I have " reviewed all pertinent imaging results/findings within the past 24 hours.   Imaging Results          CT Chest With Contrast (Final result)  Result time 02/25/19 18:07:16    Final result by Sam Mccarthy MD (02/25/19 18:07:16)                 Impression:      Interval development of multifocal airspace opacities in the right hemithorax with a cavitary lesion in the right upper lobe and new right-sided small parapneumonic effusion.  Follow-up to resolution is suggested.    Additional findings as above.      Electronically signed by: Sam Mccarthy MD  Date:    02/25/2019  Time:    18:07             Narrative:    EXAMINATION:  CT CHEST WITH CONTRAST    CLINICAL HISTORY:  Chronic cough / SOB with failed outpt. abx x 2, eval for consolidation / changes in prior noted nodule;    TECHNIQUE:  Low dose axial images, sagittal and coronal reformations were obtained from the thoracic inlet to the lung bases following the IV administration of 75 mL of Omnipaque 350.    COMPARISON:  Chest x-ray dated 02/25/2019 and CT scan of the chest dated 01/15/2019.    FINDINGS:  The bases of the neck is within normal limits.  The supraclavicular regions are unremarkable.  Thyroid gland is within normal limits.    The trachea is unremarkable.  The central airways appear patent.  No definitive endobronchial lesion is identified.  There is no evidence of bronchiectasis.    The heart is unremarkable.  There are no pericardial effusions.  There are coronary artery calcifications.    The thoracic aorta is normal in caliber.  There is no intimal flap to suggest dissection.  There are scattered calcifications along the course of the thoracic aorta.  No filling defects are identified within the pulmonary tree.    There is no evidence of lymphadenopathy in the chest.  The axillary regions are unremarkable.    There is a new small right-sided pleural effusion.  No pleural effusion identified on the left.  There is no evidence of a pneumothorax.  There is  no evidence of pneumomediastinum.    There is a cavitary opacity in the anterior segment of the right upper lobe.  New airspace opacities are identified within the right middle and right lower lobes.  There are scattered tree-in-bud opacifications in the right middle lobe.  The left lung remains relatively clear.  Previous 4 mm nodule in the left upper lobe is no longer visualized.    The esophagus is unremarkable.  The visualized upper abdominal structures are within normal limits.  There is no evidence of free air in the upper abdomen.    The chest wall is unremarkable.  There are postoperative changes in the lower cervical spine.  There are degenerative changes in the osseous structures.  There is no evidence of a fracture.

## 2019-02-27 LAB
ALBUMIN SERPL BCP-MCNC: 2 G/DL
ALP SERPL-CCNC: 68 U/L
ALT SERPL W/O P-5'-P-CCNC: 28 U/L
ANION GAP SERPL CALC-SCNC: 7 MMOL/L
AST SERPL-CCNC: 33 U/L
BASOPHILS # BLD AUTO: 0.03 K/UL
BASOPHILS NFR BLD: 0.7 %
BILIRUB SERPL-MCNC: 0.3 MG/DL
BUN SERPL-MCNC: 14 MG/DL
CALCIUM SERPL-MCNC: 8.2 MG/DL
CHLORIDE SERPL-SCNC: 113 MMOL/L
CO2 SERPL-SCNC: 20 MMOL/L
CREAT SERPL-MCNC: 1 MG/DL
DIFFERENTIAL METHOD: ABNORMAL
EOSINOPHIL # BLD AUTO: 0.3 K/UL
EOSINOPHIL NFR BLD: 6 %
ERYTHROCYTE [DISTWIDTH] IN BLOOD BY AUTOMATED COUNT: 14.1 %
EST. GFR  (AFRICAN AMERICAN): >60 ML/MIN/1.73 M^2
EST. GFR  (NON AFRICAN AMERICAN): >60 ML/MIN/1.73 M^2
GALACTOMANNAN AG SERPL IA-ACNC: <0.5 INDEX
GLUCOSE SERPL-MCNC: 125 MG/DL
HCT VFR BLD AUTO: 35 %
HGB BLD-MCNC: 11.6 G/DL
LYMPHOCYTES # BLD AUTO: 1.4 K/UL
LYMPHOCYTES NFR BLD: 31.9 %
M TB IFN-G CD4+ BCKGRND COR BLD-ACNC: 0.05 IU/ML
MAGNESIUM SERPL-MCNC: 1.5 MG/DL
MCH RBC QN AUTO: 31.4 PG
MCHC RBC AUTO-ENTMCNC: 33.1 G/DL
MCV RBC AUTO: 95 FL
MITOGEN IGNF BCKGRD COR BLD-ACNC: 8.04 IU/ML
MITOGEN IGNF BCKGRD COR BLD-ACNC: NEGATIVE [IU]/ML
MONOCYTES # BLD AUTO: 0.2 K/UL
MONOCYTES NFR BLD: 3.5 %
NEUTROPHILS # BLD AUTO: 2.6 K/UL
NEUTROPHILS NFR BLD: 57.5 %
NIL: 0.08 IU/ML
PHOSPHATE SERPL-MCNC: 3.5 MG/DL
PLATELET # BLD AUTO: 161 K/UL
PMV BLD AUTO: 10 FL
POCT GLUCOSE: 100 MG/DL (ref 70–110)
POCT GLUCOSE: 109 MG/DL (ref 70–110)
POCT GLUCOSE: 141 MG/DL (ref 70–110)
POCT GLUCOSE: 86 MG/DL (ref 70–110)
POTASSIUM SERPL-SCNC: 3.5 MMOL/L
PROT SERPL-MCNC: 6.6 G/DL
RBC # BLD AUTO: 3.69 M/UL
SODIUM SERPL-SCNC: 140 MMOL/L
TB2 - NIL: 0.04 IU/ML
WBC # BLD AUTO: 4.52 K/UL

## 2019-02-27 PROCEDURE — 87449 NOS EACH ORGANISM AG IA: CPT

## 2019-02-27 PROCEDURE — 94640 AIRWAY INHALATION TREATMENT: CPT

## 2019-02-27 PROCEDURE — 94761 N-INVAS EAR/PLS OXIMETRY MLT: CPT

## 2019-02-27 PROCEDURE — 63600175 PHARM REV CODE 636 W HCPCS: Performed by: INTERNAL MEDICINE

## 2019-02-27 PROCEDURE — 99900035 HC TECH TIME PER 15 MIN (STAT)

## 2019-02-27 PROCEDURE — 83735 ASSAY OF MAGNESIUM: CPT

## 2019-02-27 PROCEDURE — 99232 PR SUBSEQUENT HOSPITAL CARE,LEVL II: ICD-10-PCS | Mod: ,,, | Performed by: INTERNAL MEDICINE

## 2019-02-27 PROCEDURE — 25000003 PHARM REV CODE 250: Performed by: PHYSICIAN ASSISTANT

## 2019-02-27 PROCEDURE — 11000001 HC ACUTE MED/SURG PRIVATE ROOM

## 2019-02-27 PROCEDURE — 36415 COLL VENOUS BLD VENIPUNCTURE: CPT

## 2019-02-27 PROCEDURE — 25000242 PHARM REV CODE 250 ALT 637 W/ HCPCS: Performed by: INTERNAL MEDICINE

## 2019-02-27 PROCEDURE — 84100 ASSAY OF PHOSPHORUS: CPT

## 2019-02-27 PROCEDURE — 99232 SBSQ HOSP IP/OBS MODERATE 35: CPT | Mod: ,,, | Performed by: INTERNAL MEDICINE

## 2019-02-27 PROCEDURE — 85025 COMPLETE CBC W/AUTO DIFF WBC: CPT

## 2019-02-27 PROCEDURE — 80053 COMPREHEN METABOLIC PANEL: CPT

## 2019-02-27 RX ORDER — LEVOFLOXACIN 750 MG/1
750 TABLET ORAL DAILY
Status: DISCONTINUED | OUTPATIENT
Start: 2019-02-27 | End: 2019-02-28 | Stop reason: HOSPADM

## 2019-02-27 RX ADMIN — OXYCODONE HYDROCHLORIDE AND ACETAMINOPHEN 1 TABLET: 10; 325 TABLET ORAL at 08:02

## 2019-02-27 RX ADMIN — FLUTICASONE FUROATE AND VILANTEROL TRIFENATATE 1 PUFF: 200; 25 POWDER RESPIRATORY (INHALATION) at 09:02

## 2019-02-27 RX ADMIN — OXYCODONE HYDROCHLORIDE AND ACETAMINOPHEN 1 TABLET: 10; 325 TABLET ORAL at 01:02

## 2019-02-27 RX ADMIN — OXYCODONE HYDROCHLORIDE AND ACETAMINOPHEN 1 TABLET: 10; 325 TABLET ORAL at 03:02

## 2019-02-27 RX ADMIN — OXYCODONE HYDROCHLORIDE AND ACETAMINOPHEN 1 TABLET: 10; 325 TABLET ORAL at 09:02

## 2019-02-27 RX ADMIN — IPRATROPIUM BROMIDE AND ALBUTEROL SULFATE 3 ML: .5; 3 SOLUTION RESPIRATORY (INHALATION) at 03:02

## 2019-02-27 RX ADMIN — LEVOFLOXACIN 750 MG: 750 TABLET, FILM COATED ORAL at 02:02

## 2019-02-27 NOTE — SUBJECTIVE & OBJECTIVE
Interval History:  Pt seen and examined at bedside. He reports improved chest pain. Denies SOB but still with intermittent cough.     Discussed POC with SW and CM.      Review of Systems   Constitutional: Negative for chills and fever.   Respiratory: Positive for cough and shortness of breath.    Cardiovascular: Positive for chest pain.     Objective:     Vital Signs (Most Recent):  Temp: 98.3 °F (36.8 °C) (02/27/19 1200)  Pulse: 74 (02/27/19 1200)  Resp: 18 (02/27/19 1200)  BP: 110/68 (02/27/19 1200)  SpO2: 100 % (02/27/19 1200) Vital Signs (24h Range):  Temp:  [97.3 °F (36.3 °C)-98.3 °F (36.8 °C)] 98.3 °F (36.8 °C)  Pulse:  [64-79] 74  Resp:  [18-20] 18  SpO2:  [96 %-100 %] 100 %  BP: ()/(51-68) 110/68     Weight: 70 kg (154 lb 5.2 oz)  Body mass index is 24.17 kg/m².    Intake/Output Summary (Last 24 hours) at 2/27/2019 1301  Last data filed at 2/26/2019 1800  Gross per 24 hour   Intake 240 ml   Output 0 ml   Net 240 ml      Physical Exam   Constitutional: He is oriented to person, place, and time. He appears well-developed and well-nourished.   Cardiovascular: Normal rate, regular rhythm and normal heart sounds.   Pulmonary/Chest: Effort normal and breath sounds normal. He has no rales.   Abdominal: Soft. Bowel sounds are normal. He exhibits no distension. There is no tenderness.   Musculoskeletal: Normal range of motion. He exhibits no edema.   Neurological: He is alert and oriented to person, place, and time.   Skin: Skin is warm and dry.   Psychiatric: He has a normal mood and affect. His behavior is normal.   Nursing note and vitals reviewed.      Significant Labs:   BMP:   Recent Labs   Lab 02/27/19  0534   *      K 3.5   *   CO2 20*   BUN 14   CREATININE 1.0   CALCIUM 8.2*   MG 1.5*     CBC:   Recent Labs   Lab 02/25/19  1452 02/26/19  0545 02/27/19  0534   WBC 6.52 4.64 4.52   HGB 12.3* 12.2* 11.6*   HCT 36.2* 36.4* 35.0*    174 161     All pertinent labs within the past 24  hours have been reviewed.    Significant Imaging: I have reviewed all pertinent imaging results/findings within the past 24 hours.   Imaging Results          CT Chest With Contrast (Final result)  Result time 02/25/19 18:07:16    Final result by Sam Mccarthy MD (02/25/19 18:07:16)                 Impression:      Interval development of multifocal airspace opacities in the right hemithorax with a cavitary lesion in the right upper lobe and new right-sided small parapneumonic effusion.  Follow-up to resolution is suggested.    Additional findings as above.      Electronically signed by: Sam Mccarthy MD  Date:    02/25/2019  Time:    18:07             Narrative:    EXAMINATION:  CT CHEST WITH CONTRAST    CLINICAL HISTORY:  Chronic cough / SOB with failed outpt. abx x 2, eval for consolidation / changes in prior noted nodule;    TECHNIQUE:  Low dose axial images, sagittal and coronal reformations were obtained from the thoracic inlet to the lung bases following the IV administration of 75 mL of Omnipaque 350.    COMPARISON:  Chest x-ray dated 02/25/2019 and CT scan of the chest dated 01/15/2019.    FINDINGS:  The bases of the neck is within normal limits.  The supraclavicular regions are unremarkable.  Thyroid gland is within normal limits.    The trachea is unremarkable.  The central airways appear patent.  No definitive endobronchial lesion is identified.  There is no evidence of bronchiectasis.    The heart is unremarkable.  There are no pericardial effusions.  There are coronary artery calcifications.    The thoracic aorta is normal in caliber.  There is no intimal flap to suggest dissection.  There are scattered calcifications along the course of the thoracic aorta.  No filling defects are identified within the pulmonary tree.    There is no evidence of lymphadenopathy in the chest.  The axillary regions are unremarkable.    There is a new small right-sided pleural effusion.  No pleural effusion identified on the  left.  There is no evidence of a pneumothorax.  There is no evidence of pneumomediastinum.    There is a cavitary opacity in the anterior segment of the right upper lobe.  New airspace opacities are identified within the right middle and right lower lobes.  There are scattered tree-in-bud opacifications in the right middle lobe.  The left lung remains relatively clear.  Previous 4 mm nodule in the left upper lobe is no longer visualized.    The esophagus is unremarkable.  The visualized upper abdominal structures are within normal limits.  There is no evidence of free air in the upper abdomen.    The chest wall is unremarkable.  There are postoperative changes in the lower cervical spine.  There are degenerative changes in the osseous structures.  There is no evidence of a fracture.

## 2019-02-27 NOTE — PLAN OF CARE
Problem: Adult Inpatient Plan of Care  Goal: Plan of Care Review  Outcome: Ongoing (interventions implemented as appropriate)  Patient in no apparent distress. Sat's  96 % on room air. Patient states that he does not want a treatment right now even though he is complaining of shortness of breath. He does request one in the morning. 20 min later, patient called and wanted prn treatment. PRN treatment given . Will continue to monitor.

## 2019-02-27 NOTE — PROGRESS NOTES
[facility] Hospital Medicine  Progress Note    Patient Name: Josiah Richter  MRN: [id number]  Patient Class: IP- Inpatient   Admission Date: 2/25/2019  Length of Stay: 2 days  Attending Physician: Gabby Aaron MD  Primary Care Provider: Lynsey Bradford MD        Subjective:     Principal Problem:Pneumonia    HPI:  Mr. Richter is a 63/M with PMH DMII, tobacco abuse who presented to ED from Urgent Care 02/25 with a 4-5 week history of persistent cough and shortness of breath. He states that symptoms began multiple weeks ago initially with cough; it has mostly been nonproductive. He denies fevers, but attests to intermittent chills; no night sweats or rigors. He has been seen in the ED at [facility] and [facility] as well as at Urgent Care multiple times in the last month. He has been prescribed azithromycin (which was reportedly taken incorrectly from prior ED notes), cefdinir, received IM ceftriaxone, and doxycycline which he has taken; he was additionally prescribed moxifloxacin PO but was unable to pick it up due to cost. He has been prescribed multiple inhalers which he has depleted several times over the past several weeks due to frequency of use with his SOB.    Of note, he reports no prior incarceration but has been homeless in the past. He likewise reports since moving to [place] from [place] in the last 10 months that he has lost >30lb. Denies significant productive cough or blood-tinged mucus. Quit smoking approx. 02/21 after being counseled to quit during his ED visits.    Hospital Course:  Patient was admitted for evaluation of weight loss and cough. Chest CT showed multifocal airspace opacities with cavitary lesion in RUL with R parapneumonic effusion. Pulmonology was consulted for further evaluation.     Interval History:  Pt seen and examined at bedside. He reports improved chest pain. Denies SOB but still with intermittent cough.     Discussed POC with SW and CM.      Review of  Systems   Constitutional: Negative for chills and fever.   Respiratory: Positive for cough and shortness of breath.    Cardiovascular: Positive for chest pain.     Objective:     Vital Signs (Most Recent):  Temp: 98.3 °F (36.8 °C) (02/27/19 1200)  Pulse: 74 (02/27/19 1200)  Resp: 18 (02/27/19 1200)  BP: 110/68 (02/27/19 1200)  SpO2: 100 % (02/27/19 1200) Vital Signs (24h Range):  Temp:  [97.3 °F (36.3 °C)-98.3 °F (36.8 °C)] 98.3 °F (36.8 °C)  Pulse:  [64-79] 74  Resp:  [18-20] 18  SpO2:  [96 %-100 %] 100 %  BP: ()/(51-68) 110/68     Weight: 70 kg (154 lb 5.2 oz)  Body mass index is 24.17 kg/m².    Intake/Output Summary (Last 24 hours) at 2/27/2019 1301  Last data filed at 2/26/2019 1800  Gross per 24 hour   Intake 240 ml   Output 0 ml   Net 240 ml      Physical Exam   Constitutional: He is oriented to person, place, and time. He appears well-developed and well-nourished.   Cardiovascular: Normal rate, regular rhythm and normal heart sounds.   Pulmonary/Chest: Effort normal and breath sounds normal. He has no rales.   Abdominal: Soft. Bowel sounds are normal. He exhibits no distension. There is no tenderness.   Musculoskeletal: Normal range of motion. He exhibits no edema.   Neurological: He is alert and oriented to person, place, and time.   Skin: Skin is warm and dry.   Psychiatric: He has a normal mood and affect. His behavior is normal.   Nursing note and vitals reviewed.      Significant Labs:   BMP:   Recent Labs   Lab 02/27/19  0534   *      K 3.5   *   CO2 20*   BUN 14   CREATININE 1.0   CALCIUM 8.2*   MG 1.5*     CBC:   Recent Labs   Lab 02/25/19  1452 02/26/19  0545 02/27/19  0534   WBC 6.52 4.64 4.52   HGB 12.3* 12.2* 11.6*   HCT 36.2* 36.4* 35.0*    174 161     All pertinent labs within the past 24 hours have been reviewed.    Significant Imaging: I have reviewed all pertinent imaging results/findings within the past 24 hours.   Imaging Results          CT Chest With  Contrast (Final result)  Result time 02/25/19 18:07:16    Final result by Sam Mccarthy MD (02/25/19 18:07:16)                 Impression:      Interval development of multifocal airspace opacities in the right hemithorax with a cavitary lesion in the right upper lobe and new right-sided small parapneumonic effusion.  Follow-up to resolution is suggested.    Additional findings as above.      Electronically signed by: Sam Mccarthy MD  Date:    02/25/2019  Time:    18:07             Narrative:    EXAMINATION:  CT CHEST WITH CONTRAST    CLINICAL HISTORY:  Chronic cough / SOB with failed outpt. abx x 2, eval for consolidation / changes in prior noted nodule;    TECHNIQUE:  Low dose axial images, sagittal and coronal reformations were obtained from the thoracic inlet to the lung bases following the IV administration of 75 mL of Omnipaque 350.    COMPARISON:  Chest x-ray dated 02/25/2019 and CT scan of the chest dated 01/15/2019.    FINDINGS:  The bases of the neck is within normal limits.  The supraclavicular regions are unremarkable.  Thyroid gland is within normal limits.    The trachea is unremarkable.  The central airways appear patent.  No definitive endobronchial lesion is identified.  There is no evidence of bronchiectasis.    The heart is unremarkable.  There are no pericardial effusions.  There are coronary artery calcifications.    The thoracic aorta is normal in caliber.  There is no intimal flap to suggest dissection.  There are scattered calcifications along the course of the thoracic aorta.  No filling defects are identified within the pulmonary tree.    There is no evidence of lymphadenopathy in the chest.  The axillary regions are unremarkable.    There is a new small right-sided pleural effusion.  No pleural effusion identified on the left.  There is no evidence of a pneumothorax.  There is no evidence of pneumomediastinum.    There is a cavitary opacity in the anterior segment of the right upper lobe.   New airspace opacities are identified within the right middle and right lower lobes.  There are scattered tree-in-bud opacifications in the right middle lobe.  The left lung remains relatively clear.  Previous 4 mm nodule in the left upper lobe is no longer visualized.    The esophagus is unremarkable.  The visualized upper abdominal structures are within normal limits.  There is no evidence of free air in the upper abdomen.    The chest wall is unremarkable.  There are postoperative changes in the lower cervical spine.  There are degenerative changes in the osseous structures.  There is no evidence of a fracture.                                  Assessment/Plan:      * Pneumonia    Chest CT with right sided opacities with cavitary lesions  Pulmonology following, less concerned with TB given his symptoms  Continue empiric Levaquin  Initial sputum gram stain negative for AFB  Awaiting urine legionella        Moderate malnutrition    Nutrition consult, dietary supplementation with >30lb weight loss over last several years.     Chronic hepatitis C     h/o HepC without reported prior treatment.  Will need outpt. follow-up with hepatology.     Tobacco abuse    Cessation counseling provided  Declined nicotine patch.     COPD (chronic obstructive pulmonary disease)    Suspected, given 40+ pack-year history, findings noted on initial CT. Recently started on Breo.  Continue fluticasone-vilanterol 200-25mcg inhaled daily, albuterol-ipratropium 2.5-0.5mg inhaled q4hr PRN.     Type 2 diabetes mellitus, without long-term current use of insulin    Hold home antihyperglycemics   Continue low-dose sliding scale insulin aspart 0-5U subQ TIDWM PRN.       VTE Risk Mitigation (From admission, onward)        Ordered     IP VTE LOW RISK PATIENT  Once      02/25/19 1806     Place sequential compression device  Until discontinued      02/25/19 1806              Gabby Aaron MD  Department of Hospital Medicine   [facility]

## 2019-02-27 NOTE — ASSESSMENT & PLAN NOTE
Chest CT with right sided opacities with cavitary lesions  Pulmonology following, less concerned with TB given his symptoms  Continue empiric Levaquin  Initial sputum gram stain negative for AFB  Awaiting urine legionella

## 2019-02-27 NOTE — PLAN OF CARE
Problem: Adult Inpatient Plan of Care  Goal: Plan of Care Review  Outcome: Ongoing (interventions implemented as appropriate)  Patient on room air saturations WNL DPI given tolerated well.

## 2019-02-27 NOTE — NURSING
No significant events overnight. Remains free from fall, injury, and skin breakdown. Voiding independently. VSS on RA throughout the night. Denies pain. PRN nebulizer treatments administered as needed. Tele maintained; all alarms active and audible. TEDs/SCDs in place. Plan of care reviewed with patient and all questions answered. UA collected and sent to lab. Bed low, locked. Call light within reach. Purposeful rounding performed. Resting comfortably in bed, no other complaints at this time.

## 2019-02-28 VITALS
HEIGHT: 67 IN | BODY MASS INDEX: 24.22 KG/M2 | SYSTOLIC BLOOD PRESSURE: 113 MMHG | RESPIRATION RATE: 16 BRPM | HEART RATE: 90 BPM | DIASTOLIC BLOOD PRESSURE: 71 MMHG | WEIGHT: 154.31 LBS | OXYGEN SATURATION: 96 % | TEMPERATURE: 98 F

## 2019-02-28 LAB
ALBUMIN SERPL BCP-MCNC: 2.3 G/DL
ALP SERPL-CCNC: 61 U/L
ALT SERPL W/O P-5'-P-CCNC: 32 U/L
ANION GAP SERPL CALC-SCNC: 7 MMOL/L
AST SERPL-CCNC: 39 U/L
BACTERIA SPEC AEROBE CULT: NORMAL
BACTERIA SPEC AEROBE CULT: NORMAL
BASOPHILS # BLD AUTO: 0.03 K/UL
BASOPHILS NFR BLD: 0.6 %
BILIRUB SERPL-MCNC: 0.5 MG/DL
BUN SERPL-MCNC: 15 MG/DL
CALCIUM SERPL-MCNC: 8.5 MG/DL
CHLORIDE SERPL-SCNC: 111 MMOL/L
CO2 SERPL-SCNC: 21 MMOL/L
CREAT SERPL-MCNC: 1 MG/DL
DIFFERENTIAL METHOD: ABNORMAL
EOSINOPHIL # BLD AUTO: 0.4 K/UL
EOSINOPHIL NFR BLD: 7.3 %
ERYTHROCYTE [DISTWIDTH] IN BLOOD BY AUTOMATED COUNT: 14.1 %
EST. GFR  (AFRICAN AMERICAN): >60 ML/MIN/1.73 M^2
EST. GFR  (NON AFRICAN AMERICAN): >60 ML/MIN/1.73 M^2
GLUCOSE SERPL-MCNC: 87 MG/DL
GRAM STN SPEC: NORMAL
HCT VFR BLD AUTO: 36.9 %
HGB BLD-MCNC: 12.3 G/DL
LYMPHOCYTES # BLD AUTO: 1.6 K/UL
LYMPHOCYTES NFR BLD: 30.5 %
MAGNESIUM SERPL-MCNC: 1.6 MG/DL
MCH RBC QN AUTO: 31.9 PG
MCHC RBC AUTO-ENTMCNC: 33.3 G/DL
MCV RBC AUTO: 96 FL
MONOCYTES # BLD AUTO: 0.3 K/UL
MONOCYTES NFR BLD: 5.7 %
NEUTROPHILS # BLD AUTO: 2.9 K/UL
NEUTROPHILS NFR BLD: 55.7 %
PHOSPHATE SERPL-MCNC: 3.2 MG/DL
PLATELET # BLD AUTO: 178 K/UL
PMV BLD AUTO: 10 FL
POCT GLUCOSE: 136 MG/DL (ref 70–110)
POTASSIUM SERPL-SCNC: 3.9 MMOL/L
PROT SERPL-MCNC: 7.5 G/DL
RBC # BLD AUTO: 3.86 M/UL
SODIUM SERPL-SCNC: 139 MMOL/L
WBC # BLD AUTO: 5.22 K/UL

## 2019-02-28 PROCEDURE — 94640 AIRWAY INHALATION TREATMENT: CPT

## 2019-02-28 PROCEDURE — 25000003 PHARM REV CODE 250: Performed by: PHYSICIAN ASSISTANT

## 2019-02-28 PROCEDURE — 36415 COLL VENOUS BLD VENIPUNCTURE: CPT

## 2019-02-28 PROCEDURE — 94761 N-INVAS EAR/PLS OXIMETRY MLT: CPT

## 2019-02-28 PROCEDURE — 83735 ASSAY OF MAGNESIUM: CPT

## 2019-02-28 PROCEDURE — 63600175 PHARM REV CODE 636 W HCPCS: Performed by: INTERNAL MEDICINE

## 2019-02-28 PROCEDURE — 25000242 PHARM REV CODE 250 ALT 637 W/ HCPCS: Performed by: INTERNAL MEDICINE

## 2019-02-28 PROCEDURE — 85025 COMPLETE CBC W/AUTO DIFF WBC: CPT

## 2019-02-28 PROCEDURE — 80053 COMPREHEN METABOLIC PANEL: CPT

## 2019-02-28 PROCEDURE — 99238 PR HOSPITAL DISCHARGE DAY,<30 MIN: ICD-10-PCS | Mod: ,,, | Performed by: INTERNAL MEDICINE

## 2019-02-28 PROCEDURE — 99238 HOSP IP/OBS DSCHRG MGMT 30/<: CPT | Mod: ,,, | Performed by: INTERNAL MEDICINE

## 2019-02-28 PROCEDURE — 84100 ASSAY OF PHOSPHORUS: CPT

## 2019-02-28 RX ORDER — ALBUTEROL SULFATE 90 UG/1
1-2 AEROSOL, METERED RESPIRATORY (INHALATION) EVERY 6 HOURS PRN
Qty: 1 INHALER | Refills: 0 | Status: SHIPPED | OUTPATIENT
Start: 2019-02-28 | End: 2020-05-06

## 2019-02-28 RX ORDER — LEVOFLOXACIN 750 MG/1
750 TABLET ORAL DAILY
Qty: 7 TABLET | Refills: 0 | Status: SHIPPED | OUTPATIENT
Start: 2019-03-01 | End: 2019-03-08

## 2019-02-28 RX ORDER — FLUTICASONE FUROATE AND VILANTEROL 200; 25 UG/1; UG/1
1 POWDER RESPIRATORY (INHALATION) DAILY
Qty: 60 EACH | Refills: 0 | Status: SHIPPED | OUTPATIENT
Start: 2019-02-28 | End: 2019-03-30

## 2019-02-28 RX ORDER — PROMETHAZINE HYDROCHLORIDE AND CODEINE PHOSPHATE 6.25; 1 MG/5ML; MG/5ML
5 SOLUTION ORAL EVERY 4 HOURS PRN
Qty: 1 BOTTLE | Refills: 0 | Status: SHIPPED | OUTPATIENT
Start: 2019-02-28 | End: 2019-03-05

## 2019-02-28 RX ADMIN — LEVOFLOXACIN 750 MG: 750 TABLET, FILM COATED ORAL at 08:02

## 2019-02-28 RX ADMIN — IPRATROPIUM BROMIDE AND ALBUTEROL SULFATE 3 ML: .5; 3 SOLUTION RESPIRATORY (INHALATION) at 08:02

## 2019-02-28 RX ADMIN — OXYCODONE HYDROCHLORIDE AND ACETAMINOPHEN 1 TABLET: 10; 325 TABLET ORAL at 04:02

## 2019-02-28 RX ADMIN — FLUTICASONE FUROATE AND VILANTEROL TRIFENATATE 1 PUFF: 200; 25 POWDER RESPIRATORY (INHALATION) at 08:02

## 2019-02-28 NOTE — PROGRESS NOTES
Patient up on side of bed on room air saturations 96% prn aerosol treatment and daily DPI given tolerated well.

## 2019-02-28 NOTE — PLAN OF CARE
Glucometer strips called into [facility] on [address] - patient reports having lancets at home - patient is requesting cough syrup with codeine - Dr Aaron notified - patient will meet his son outside & he will provide transportation home - patient denied any further DC needs      02/28/19 0948   Final Note   Assessment Type Final Discharge Note   Anticipated Discharge Disposition Home   What phone number can be called within the next 1-3 days to see how you are doing after discharge? [phone]   Hospital Follow Up  Appt(s) scheduled? Yes   Discharge plans and expectations educations in teach back method with documentation complete? Yes   Right Care Referral Info   Post Acute Recommendation No Care

## 2019-02-28 NOTE — DISCHARGE SUMMARY
[facility] Hospital Medicine  Discharge Summary      Patient Name: Josiah Richter  MRN: [id number]  Admission Date: 2/25/2019  Hospital Length of Stay: 3 days  Discharge Date and Time:  02/28/2019 9:01 AM  Attending Physician: Gabby Aaron MD   Discharging Provider: Gabby Aaron MD  Primary Care Provider: Lynsey Bradford MD      HPI:   Mr. Richter is a 63/M with PMH DMII, tobacco abuse who presented to ED from Urgent Care 02/25 with a 4-5 week history of persistent cough and shortness of breath. He states that symptoms began multiple weeks ago initially with cough; it has mostly been nonproductive. He denies fevers, but attests to intermittent chills; no night sweats or rigors. He has been seen in the ED at [facility] and [facility] as well as at Urgent Care multiple times in the last month. He has been prescribed azithromycin (which was reportedly taken incorrectly from prior ED notes), cefdinir, received IM ceftriaxone, and doxycycline which he has taken; he was additionally prescribed moxifloxacin PO but was unable to pick it up due to cost. He has been prescribed multiple inhalers which he has depleted several times over the past several weeks due to frequency of use with his SOB.    Of note, he reports no prior incarceration but has been homeless in the past. He likewise reports since moving to [place] from [place] in the last 10 months that he has lost >30lb. Denies significant productive cough or blood-tinged mucus. Quit smoking approx. 02/21 after being counseled to quit during his ED visits.    * No surgery found *      Hospital Course:   Patient was admitted for evaluation of weight loss and cough. Chest CT showed multifocal airspace opacities with cavitary lesion in RUL with R parapneumonic effusion. Pulmonology was consulted for further evaluation and did not feel that the pulmonary infiltrates were cavitary. He was started on empiric anti-pseudomonal antibiotics and had  improvement in symptoms. Sputum did not show AFB. Since he had improvement he was able to discharge home on oral Levofloxacin. He was instructed to follow-up with PCP for repeat CXR in 6 weeks. Also discussed plan of care with his son over the phone.      Consults:   Consults (From admission, onward)        Status Ordering Provider     Inpatient consult to Pulmonary Critical Care  Once     Provider:  Ankush Cervantes MD    Completed JENNIFER JONES          No new Assessment & Plan notes have been filed under this hospital service since the last note was generated.  Service: Hospital Medicine    Final Active Diagnoses:    Diagnosis Date Noted POA    PRINCIPAL PROBLEM:  Pneumonia [J18.9] 02/25/2019 Yes    Type 2 diabetes mellitus, without long-term current use of insulin [E11.9] 02/25/2019 Yes     Chronic    COPD (chronic obstructive pulmonary disease) [J44.9] 02/25/2019 Yes    Tobacco abuse [Z72.0] 02/25/2019 Yes     Chronic    Chronic hepatitis C [B18.2] 02/25/2019 Yes     Chronic    Moderate malnutrition [E44.0] 02/25/2019 Yes     Chronic      Problems Resolved During this Admission:       Discharged Condition: good    Disposition: Home or Self Care    Follow Up:  Follow-up Information     Lynsey Bradford MD In 2 weeks.    Specialty:  Family Medicine  Why:  See in 2 weeks for hospital follow-up. You will need repeat CXR in 6 weeks to ensure resolution of pneumonia  Contact information:  [address] [facility] [id number] [phone]                 Patient Instructions:      Diet diabetic     Activity as tolerated       Significant Diagnostic Studies: Labs:   BMP:   Recent Labs   Lab 02/27/19  0534 02/28/19  0440   * 87    139   K 3.5 3.9   * 111*   CO2 20* 21*   BUN 14 15   CREATININE 1.0 1.0   CALCIUM 8.2* 8.5*   MG 1.5* 1.6   , CBC   Recent Labs   Lab 02/27/19  0534 02/28/19  0440   WBC 4.52 5.22   HGB 11.6* 12.3*   HCT 35.0* 36.9*     178    and All labs within the past 24 hours have been reviewed    Pending Diagnostic Studies:     Procedure Component Value Units Date/Time    Legionella antigen, urine [[id number]] Collected:  02/27/19 0108    Order Status:  Sent Lab Status:  In process Updated:  02/27/19 0938    Specimen:  Urine, Clean Catch          Medications:  Reconciled Home Medications:      Medication List      START taking these medications    levoFLOXacin 750 MG tablet  Commonly known as:  LEVAQUIN  Take 1 tablet (750 mg total) by mouth once daily. for 7 days  Start taking on:  3/1/2019        CONTINUE taking these medications    albuterol 90 mcg/actuation inhaler  Commonly known as:  PROVENTIL/VENTOLIN HFA  Inhale 1-2 puffs into the lungs every 6 (six) hours as needed for Wheezing. Rescue     fluticasone-vilanterol 200-25 mcg/dose Dsdv diskus inhaler  Commonly known as:  BREO  Inhale 1 puff into the lungs once daily. Controller     ibuprofen 800 MG tablet  Commonly known as:  ADVIL,MOTRIN  Take 1 tablet (800 mg total) by mouth every 6 (six) hours as needed for Pain.     ipratropium 42 mcg (0.06 %) nasal spray  Commonly known as:  ATROVENT  2 sprays by Nasal route 4 (four) times daily.        STOP taking these medications    azithromycin 250 MG tablet  Commonly known as:  Z-GHULAM     metFORMIN 500 MG tablet  Commonly known as:  GLUCOPHAGE     methylPREDNISolone 4 mg tablet  Commonly known as:  MEDROL DOSEPACK     multivitamin-Ca-iron-minerals Tab  Commonly known as:  MULTIVITAMIN-CALCIUM AND IRON     ondansetron 4 MG Tbdl  Commonly known as:  ZOFRAN-ODT     oxyCODONE-acetaminophen  mg per tablet  Commonly known as:  PERCOCET            Indwelling Lines/Drains at time of discharge:   Lines/Drains/Airways          None          Time spent on the discharge of patient: 30 minutes  Patient was seen and examined on the date of discharge and determined to be suitable for discharge.         Gabby Aaron MD  Department of Hospital  Medicine  [facility]

## 2019-02-28 NOTE — NURSING
Eager & in agreement w/ DC. VU of DC instructions-paperwork passed & explained, scripts called to pharm per MD.  IV removed w/ cath tip intact, WNL. To be DCd home per self -Refused transport.  Dressed, ready & ride arrives. Free from falls, injury, or skin breakdown this hospital admission.

## 2019-02-28 NOTE — PLAN OF CARE
Problem: Adult Inpatient Plan of Care  Goal: Plan of Care Review  Outcome: Ongoing (interventions implemented as appropriate)  Patient remained free from falls/injury throughout shift.  No acute changes in status.  Pain managed on ordered pain medication.  Bed locked in lowest position.  Side rails up x2.  Call bell within reach.  Purposeful rounding maintained throughout shift.

## 2019-03-01 ENCOUNTER — OFFICE VISIT (OUTPATIENT)
Dept: URGENT CARE | Facility: CLINIC | Age: 64
End: 2019-03-01
Payer: MEDICARE

## 2019-03-01 ENCOUNTER — TELEPHONE (OUTPATIENT)
Dept: INTERNAL MEDICINE | Facility: CLINIC | Age: 64
End: 2019-03-01

## 2019-03-01 VITALS
BODY MASS INDEX: 23.7 KG/M2 | OXYGEN SATURATION: 98 % | WEIGHT: 151 LBS | HEIGHT: 67 IN | SYSTOLIC BLOOD PRESSURE: 129 MMHG | RESPIRATION RATE: 18 BRPM | DIASTOLIC BLOOD PRESSURE: 74 MMHG | HEART RATE: 91 BPM | TEMPERATURE: 98 F

## 2019-03-01 DIAGNOSIS — R07.9 CHEST PAIN, UNSPECIFIED TYPE: ICD-10-CM

## 2019-03-01 DIAGNOSIS — Z76.5 DRUG-SEEKING BEHAVIOR: Primary | ICD-10-CM

## 2019-03-01 PROBLEM — F11.90 CHRONIC, CONTINUOUS USE OF OPIOIDS: Status: ACTIVE | Noted: 2018-05-30

## 2019-03-01 LAB — L PNEUMO AG UR QL IA: NOT DETECTED

## 2019-03-01 PROCEDURE — 99211 PR OFFICE/OUTPT VISIT, EST, LEVL I: ICD-10-PCS | Mod: S$GLB,,, | Performed by: NURSE PRACTITIONER

## 2019-03-01 PROCEDURE — 99211 OFF/OP EST MAY X REQ PHY/QHP: CPT | Mod: S$GLB,,, | Performed by: NURSE PRACTITIONER

## 2019-03-01 RX ORDER — BUPRENORPHINE HYDROCHLORIDE, NALOXONE HYDROCHLORIDE 8; 2 MG/1; MG/1
FILM, SOLUBLE BUCCAL; SUBLINGUAL
Refills: 0 | COMMUNITY
Start: 2019-02-22

## 2019-03-01 NOTE — TELEPHONE ENCOUNTER
Patient calling trying to be seen by a doctor today since he only saw a nurse Practitioner at , wants a PCP, explained to him that it is the same level of care, he would be charged by us/not covered by his insurance, he agreed to come back on Monday.

## 2019-03-01 NOTE — PROGRESS NOTES
"Subjective:       Patient ID: Josiah Richter is a 63 y.o. male.    Vitals:  height is 5' 7" (1.702 m) and weight is 68.5 kg (151 lb). His oral temperature is 98.1 °F (36.7 °C). His blood pressure is 129/74 and his pulse is 91. His respiration is 18 and oxygen saturation is 98%.     Chief Complaint: Chest Pain    Chest pain for about a month, pt states he was at [facility] for 5 days hospitalized but he still has pain and it's hard to breath so he is here because he doesn't have a primary care doctor.       Chest Pain    This is a new problem. The current episode started 1 to 4 weeks ago. The onset quality is sudden. The problem occurs intermittently. The pain is present in the epigastric region. The pain is at a severity of 10/10. The pain is severe. The quality of the pain is described as sharp. The pain does not radiate. Pertinent negatives include no abdominal pain, back pain, fever, nausea, palpitations, shortness of breath, syncope or vomiting. The pain is aggravated by nothing. There are no known risk factors.   His past medical history is significant for diabetes. Prior diagnostic workup includes chest x-ray and echocardiogram.       Constitution: Negative for chills, fatigue and fever.   HENT: Negative for trouble swallowing.    Neck: Negative for neck pain.   Cardiovascular: Positive for chest pain. Negative for chest trauma, leg swelling, palpitations and passing out.   Respiratory: Negative for sleep apnea and shortness of breath.    Gastrointestinal: Negative for abdominal pain, nausea, vomiting and heartburn.   Musculoskeletal: Negative for back pain and pain with walking.   Skin: Negative for rash.   Neurological: Negative for light-headedness and passing out.   Hematologic/Lymphatic: Negative for history of blood clots.   Psychiatric/Behavioral: Negative for nervous/anxious. The patient is not nervous/anxious.        Objective:      Physical Exam    Assessment:       1. " Drug-seeking behavior    2. Chest pain, unspecified type        Plan:         Drug-seeking behavior    Chest pain, unspecified type            Suboxone filled on the 2/22/19   C/o chest pain recently seen at ER and discharged from the hospital yesterday, upon entering the room patient is specifically asking for percocet and states when he was at this recently he was offered percocet at this urgent care and turned them down at the previous visit. I offered him an EKG but because he is on the suboxone I did tell him I would not be filling the percocet. After I offered a EKG,  the patient packed up his papers and said thank you for your help and left the room. I was unable to complete an evaluation

## 2019-03-02 LAB
BACTERIA BLD CULT: NORMAL
BACTERIA BLD CULT: NORMAL

## 2019-03-06 PROBLEM — D53.9 MACROCYTIC ANEMIA: Status: ACTIVE | Noted: 2019-03-06

## 2019-03-06 PROBLEM — F11.20 OPIATE DEPENDENCE: Status: ACTIVE | Noted: 2018-05-30

## 2019-03-06 NOTE — PROGRESS NOTES
"Subjective:       Patient ID: Josiah Richter is a 63 y.o. male who  has a past medical history of Anxiety, Diabetes mellitus (2017), Diabetes mellitus, type 2, HCV (hepatitis C virus), Kidney stone, and Urinary tract infection.    Chief Complaint: Establish Care and Hospital Follow Up (Pneumonia)     HPI    History was obtained from the patient and supplemented through chart review  The patient has not seen a PCP in our system.  Has previously seeing FM, Dr. Bradford at [facility].  -Reviewed outside records from [facility] regarding multiple ED visits for cough 1-2/2019.    Pleuritic pain, RUL PNA:   The patient was recently admitted to [facility] for PNA.  Presented with persistent cough, SOB with chills.  Couldn't catch his breath in the middle of night.  Has presented to multiple EDs, urgent care.  Was prescribed multiple abx, azithromycin, IM ceftriaxone, doxycycline, but was also not taking it correctly.  Also used up multiple inhalers due to SOB.  Reported history of homelessness.  No history of incarceration.  Reports 30 lb weight loss in 10 months.    CXR with RML lobe opacity.  Chest CT showed multifocal airspace opacities with cavitary lesion in RUL with R parapneumonic effusion. Pulmonology did not feel that the pulmonary infiltrates were cavitary. He was started on empiric anti-pseudomonal antibiotics and had improvement in symptoms. AFB neg.  Resp Cx with normal tian. BCx x 2 NG.  He improved and was discharged on Levofloxacin 750 x 7 days, which he will complete. Needs repeat CXR in about 6 weeks.    Continues to report mid "chest soreness" with coughing.  Is still waking up in the middle of the night due to SOB and cough.  Denies fever.  Reports difficulty expectorating at night with white or yellow sputum.  Denies hemoptysis.  Denies chronic cough.  Has been using his rescue inhaler 5 to 6 times a day not due to shortness of breath, but because he thinks that it will help with recovery.  Has decreased " tobacco use from 1.5 packs a day to 1 cigarette a day.  Denies history of COPD.  Was started on Albuterol, Breo during ED visits.  Denies history of BARBA or physical limitations.  Has never had PFTs.       HCV:   History of hepatitis 45 years ago.  Has never been treated for it.  Might have already been vaccinated for hepatitis A and B.  HIV NR 01/24/2019 at [facility].      Tobacco use:  He smokes 1.5 ppd.  Has decreased to 1 cigarette a day.  Currently smells of tobacco and has a pack of cigarettes in his pocket.  Started smoking since 18 yoa.  46 pack year history.  Will discuss pneumococcal vaccine at later visit    Opiate dependence:  He is being prescribed Suboxone 8 by Dr[facility]  Has not taken in 1 week.  Suboxone 02/22/2019.  Will see him tomorrow.    The patient was seen in Urgent Care on 03/01/2019 for CP, suspected drug-seeking behavior.  He immediately requested Percocet.  He left and did not complete his evaluation.    Insomnia:  Is requesting medications to help with insomnia.  History of opiate dependence as above.    History of DM2.  Was previously taking metformin 500 but not consistently, which was discontinued during his admission due to A1c of 5.5.  Hemoglobin A1C   Date Value Ref Range Status   02/25/2019 5.5 4.0 - 5.6 % Final     Comment:     ADA Screening Guidelines:  5.7-6.4%  Consistent with prediabetes  >or=6.5%  Consistent with diabetes  High levels of fetal hemoglobin interfere with the HbA1C  assay. Heterozygous hemoglobin variants (HbS, HgC, etc)do  not significantly interfere with this assay.   However, presence of multiple variants may affect accuracy.       Review of Systems   Constitutional: Negative for fever and unexpected weight change.   HENT: Negative for rhinorrhea and sneezing.    Eyes: Negative for redness and itching.   Respiratory: Positive for cough and shortness of breath.    Cardiovascular: Positive for chest pain. Negative for palpitations.    Gastrointestinal: Negative for abdominal pain and diarrhea.   Genitourinary: Negative for dysuria and hematuria.   Musculoskeletal: Negative for gait problem and joint swelling.   Skin: Negative for color change and rash.   Neurological: Negative for dizziness and light-headedness.   Hematological: Negative for adenopathy.   Psychiatric/Behavioral: Negative for self-injury. The patient is not nervous/anxious.        Past Medical History:   Diagnosis Date    Anxiety     Diabetes mellitus 2017    Diabetes mellitus, type 2     HCV (hepatitis C virus)     Kidney stone     s/p L ureteral stent placement on 2018. Infection cleared and he subsequently had a stent removal    Urinary tract infection      Past Surgical History:   Procedure Laterality Date    KIDNEY STONE SURGERY      NECK SURGERY      secondary to previous fall     Family History   Problem Relation Age of Onset    Hypertension Mother     Diabetes Mother     Stroke Mother     Coronary artery disease Father         CABG    Prostate cancer Neg Hx     Kidney disease Neg Hx     Colon cancer Neg Hx      Social History     Socioeconomic History    Marital status:      Spouse name: Not on file    Number of children: Not on file    Years of education: Not on file    Highest education level: Not on file   Social Needs    Financial resource strain: Not on file    Food insecurity - worry: Not on file    Food insecurity - inability: Not on file    Transportation needs - medical: Not on file    Transportation needs - non-medical: Not on file   Occupational History    Not on file   Tobacco Use    Smoking status: Current Every Day Smoker     Packs/day: 1.50     Years: 40.00     Pack years: 60.00     Last attempt to quit: 2019     Years since quittin.0    Smokeless tobacco: Never Used   Substance and Sexual Activity    Alcohol use: No    Drug use: Yes     Types: Oxycodone    Sexual activity: Not Currently   Other Topics  "Concern    Not on file   Social History Narrative    Not on file     Objective:      Vitals:    03/07/19 0928   BP: 118/62   Pulse: 81   SpO2: 98%   Weight: 68.6 kg (151 lb 3.8 oz)   Height: 5' 7" (1.702 m)      Physical Exam   Constitutional: He appears well-developed and well-nourished. No distress.   Smells of tobacco, has pack of cigarettes in his pocket   HENT:   Head: Normocephalic and atraumatic.   Nose: Nose normal.   Mouth/Throat: Oropharynx is clear and moist. No oropharyngeal exudate.   Eyes: Conjunctivae and EOM are normal. Pupils are equal, round, and reactive to light. Right eye exhibits no discharge. Left eye exhibits no discharge. No scleral icterus.   Neck: Neck supple. No tracheal deviation present. No thyromegaly present.   Cardiovascular: Normal rate, regular rhythm, normal heart sounds and intact distal pulses.   No murmur heard.  Pulmonary/Chest: Effort normal and breath sounds normal. No respiratory distress. He has no wheezes.   Abdominal: Soft. Bowel sounds are normal. There is no tenderness.   Musculoskeletal: He exhibits no edema or deformity.   Lymphadenopathy:     He has no cervical adenopathy.   Neurological: He is alert. No cranial nerve deficit. Gait normal.   Skin: Skin is warm and dry. Capillary refill takes less than 2 seconds. He is not diaphoretic. No erythema.   Psychiatric: He has a normal mood and affect. His behavior is normal.         Lab Results   Component Value Date    WBC 5.22 02/28/2019    HGB 12.3 (L) 02/28/2019    HCT 36.9 (L) 02/28/2019     02/28/2019    CHOL 119 (L) 03/13/2015    TRIG 98 03/13/2015    HDL 28 (L) 03/13/2015    ALT 32 02/28/2019    AST 39 02/28/2019     02/28/2019    K 3.9 02/28/2019     (H) 02/28/2019    CREATININE 1.0 02/28/2019    BUN 15 02/28/2019    CO2 21 (L) 02/28/2019    HGBA1C 5.5 02/25/2019       The ASCVD Risk score (Cooks CAL Oak[place]., et al., 2013) failed to calculate for the following reasons:    Cannot find a previous HDL " lab    Cannot find a previous total cholesterol lab    (Imaging have been independently reviewed)  RUL Cavitary lesion on CT    Assessment:       1. Pneumonia of right upper lobe due to infectious organism    2. Chronic obstructive pulmonary disease, unspecified COPD type    3. Chronic hepatitis C without hepatic coma    4. Heavy smoker (more than 20 cigarettes per day)    5. Opioid dependence with opioid-induced disorder    6. Other insomnia    7. Type 2 diabetes mellitus without complication, without long-term current use of insulin    8. Macrocytic anemia    9. Encounter for lipid screening for cardiovascular disease          Plan:       Josiah was seen today for [facility] care and hospital follow up.    Diagnoses and all orders for this visit:    Pneumonia of right upper lobe due to infectious organism  Comments:  Resp with tian, BCx x2 NG, AFB neg. Repeat CXR 1 mo; Pulm referral if not resolved. Will complete Levaquin. Discussed tobacco cessation.  Orders:  -     X-Ray Chest PA And Lateral; Future  -     dextromethorphan-guaifenesin  mg (MUCINEX DM)  mg per 12 hr tablet; Take 1 tablet by mouth 2 (two) times daily. for 10 days    Chronic obstructive pulmonary disease, unspecified COPD type  Comments:  suspected. No h/o BARBA. Heavy tobacco use. Will check PFTs, 6MWT after PNA resolved.    Chronic hepatitis C without hepatic coma  Comments:  Has never been treated.  Checking viral load before referral to HCV clinic for treatment. HIV NR at [facility].  Orders:  -     HEPATITIS C GENOTYPE; Future  -     HCV MONITOR, LULY AMPLICOR; Future    Heavy smoker (more than 20 cigarettes per day)  Comments:  Has decreased tobacco use.  Discussed tobacco cessation for 5 min. 46 pack year history. Already had CT chest. Will need to discuss pneumococcal vaccine    Opioid dependence with opioid-induced disorder  Comments:  On Suboxone prescribed by Dr. Barragan, will see tomorrow    Other insomnia  Comments:  History of  opiate dependence as above.  Encouraged to discuss with Dr. Barragan tomorrow    Type 2 diabetes mellitus without complication, without long-term current use of insulin  Comments:  Improved without medications.  A1c 5.5.  Will discuss eye, foot exams at next visit  Orders:  -     Microalbumin/creatinine urine ratio; Future    Macrocytic anemia  -     Ferritin; Future  -     Iron and TIBC; Future  -     Vitamin B12; Future  -     Folate; Future    Encounter for lipid screening for cardiovascular disease  -     Lipid panel; Future    Other orders  -     Cancel: Hemoglobin A1c; Future  -     Cancel: HIV RNA, QUANTITATIVE, PCR; Future         Side effects of medication(s) were discussed in detail and patient voiced understanding.  Patient will call back for any issues or complications.     RTC in 1 month(s) or sooner PRN after CXR to follow-up for pneumonia.

## 2019-03-07 ENCOUNTER — OFFICE VISIT (OUTPATIENT)
Dept: INTERNAL MEDICINE | Facility: CLINIC | Age: 64
End: 2019-03-07
Payer: MEDICARE

## 2019-03-07 VITALS
HEART RATE: 81 BPM | DIASTOLIC BLOOD PRESSURE: 62 MMHG | SYSTOLIC BLOOD PRESSURE: 118 MMHG | HEIGHT: 67 IN | WEIGHT: 151.25 LBS | BODY MASS INDEX: 23.74 KG/M2 | OXYGEN SATURATION: 98 %

## 2019-03-07 DIAGNOSIS — F17.210 HEAVY SMOKER (MORE THAN 20 CIGARETTES PER DAY): ICD-10-CM

## 2019-03-07 DIAGNOSIS — Z13.6 ENCOUNTER FOR LIPID SCREENING FOR CARDIOVASCULAR DISEASE: ICD-10-CM

## 2019-03-07 DIAGNOSIS — B18.2 CHRONIC HEPATITIS C WITHOUT HEPATIC COMA: Chronic | ICD-10-CM

## 2019-03-07 DIAGNOSIS — J18.9 PNEUMONIA OF RIGHT UPPER LOBE DUE TO INFECTIOUS ORGANISM: Primary | ICD-10-CM

## 2019-03-07 DIAGNOSIS — J44.9 CHRONIC OBSTRUCTIVE PULMONARY DISEASE, UNSPECIFIED COPD TYPE: ICD-10-CM

## 2019-03-07 DIAGNOSIS — G47.09 OTHER INSOMNIA: ICD-10-CM

## 2019-03-07 DIAGNOSIS — E11.9 TYPE 2 DIABETES MELLITUS WITHOUT COMPLICATION, WITHOUT LONG-TERM CURRENT USE OF INSULIN: Chronic | ICD-10-CM

## 2019-03-07 DIAGNOSIS — F11.29 OPIOID DEPENDENCE WITH OPIOID-INDUCED DISORDER: ICD-10-CM

## 2019-03-07 DIAGNOSIS — Z13.220 ENCOUNTER FOR LIPID SCREENING FOR CARDIOVASCULAR DISEASE: ICD-10-CM

## 2019-03-07 DIAGNOSIS — D53.9 MACROCYTIC ANEMIA: ICD-10-CM

## 2019-03-07 PROCEDURE — 99999 PR PBB SHADOW E&M-EST. PATIENT-LVL III: CPT | Mod: PBBFAC,,, | Performed by: INTERNAL MEDICINE

## 2019-03-07 PROCEDURE — 99999 PR PBB SHADOW E&M-EST. PATIENT-LVL III: ICD-10-PCS | Mod: PBBFAC,,, | Performed by: INTERNAL MEDICINE

## 2019-03-07 PROCEDURE — 99406 BEHAV CHNG SMOKING 3-10 MIN: CPT | Mod: S$PBB,ICN,, | Performed by: INTERNAL MEDICINE

## 2019-03-07 PROCEDURE — 99214 PR OFFICE/OUTPT VISIT, EST, LEVL IV, 30-39 MIN: ICD-10-PCS | Mod: 25,S$PBB,ICN, | Performed by: INTERNAL MEDICINE

## 2019-03-07 PROCEDURE — 99213 OFFICE O/P EST LOW 20 MIN: CPT | Mod: PBBFAC | Performed by: INTERNAL MEDICINE

## 2019-03-07 PROCEDURE — 99406 PR TOBACCO USE CESSATION INTERMEDIATE 3-10 MINUTES: ICD-10-PCS | Mod: S$PBB,ICN,, | Performed by: INTERNAL MEDICINE

## 2019-03-07 PROCEDURE — 99214 OFFICE O/P EST MOD 30 MIN: CPT | Mod: 25,S$PBB,ICN, | Performed by: INTERNAL MEDICINE

## 2019-03-19 ENCOUNTER — OFFICE VISIT (OUTPATIENT)
Dept: URGENT CARE | Facility: CLINIC | Age: 64
End: 2019-03-19
Payer: MEDICARE

## 2019-03-19 VITALS
WEIGHT: 151 LBS | BODY MASS INDEX: 23.7 KG/M2 | OXYGEN SATURATION: 98 % | HEIGHT: 67 IN | SYSTOLIC BLOOD PRESSURE: 118 MMHG | RESPIRATION RATE: 16 BRPM | DIASTOLIC BLOOD PRESSURE: 61 MMHG | TEMPERATURE: 97 F | HEART RATE: 106 BPM

## 2019-03-19 DIAGNOSIS — R05.3 PERSISTENT COUGH FOR 3 WEEKS OR LONGER: Primary | ICD-10-CM

## 2019-03-19 PROCEDURE — 99213 PR OFFICE/OUTPT VISIT, EST, LEVL III, 20-29 MIN: ICD-10-PCS | Mod: S$GLB,,, | Performed by: NURSE PRACTITIONER

## 2019-03-19 PROCEDURE — 99213 OFFICE O/P EST LOW 20 MIN: CPT | Mod: S$GLB,,, | Performed by: NURSE PRACTITIONER

## 2019-03-19 RX ORDER — BENZONATATE 100 MG/1
100 CAPSULE ORAL 3 TIMES DAILY PRN
Qty: 30 CAPSULE | Refills: 1 | Status: SHIPPED | OUTPATIENT
Start: 2019-03-19 | End: 2020-03-18

## 2019-03-19 NOTE — PATIENT INSTRUCTIONS
"  General Referral to [facility]     You were referred to [facility] to Establish Care.    Please call [phone] to schedule your appointment.    Please go to the Emergency Department for any concerns or worsening of condition.  Please follow up with your primary care doctor or specialist in the next 48-72hrs as needed.    If you  smoke, please stop smoking.    Cough   If your condition worsens or fails to improve we recommend that you receive another evaluation at the ER immediately or contact your PCP to discuss your concerns or return here. You must understand that you've received an urgent care treatment only and that you may be released before all your medical problems are known or treated. You the patient will arrange for [facility] care as instructed. .  Rest and fluids are important  Can use honey with marlena to soothe your throat  Take prescription cough meds (pills) as prescribed;  Do not any other over the counter cough meds during the time you take the prescribed cough pills .   -  Flonase (fluticasone) is a nasal spray which is available over the counter and may help with your symptoms.   -  If you have hypertension avoid using the "D" which is the decongestant.  Instead you can use Coricidin HBP for cold and cough symptoms.    -  If you just have drainage you can take plain Zyrtec, Claritin or Allegra   -  Tylenol or ibuprofen can also be used as directed for pain unless you have an allergy to them or medical condition such as stomach ulcers, kidney or liver disease or blood thinners etc for which you should not be taking these type of medications.   Please follow up with your primary care doctor or specialist in the next 48-72hrs as discussed  If you  smoke, please stop smoking.  Cough, Chronic, Uncertain Cause (Adult)    Everyone has had a cough as part of the common cold, flu, or bronchitis. This kind of cough occurs along with an achy feeling, low-grade fever, nasal and sinus " congestion, and a scratchy or sore throat. This usually gets better in 2 to 3 weeks. A cough that lasts longer than 3 weeks may be due to other causes.  If your cough does not improve over the next 2 weeks, further testing may be needed. Follow up with your healthcare provider as advised. Cough suppressants may be recommended. Based on your exam today, the exact cause of your cough is not certain. Below are some common causes for persistent cough.  Smokers cough  Smokers cough doesnt go away. If you continue to smoke, it only gets worse. The cough is from irritation in the air passages. Talk to your healthcare provider about quitting. Medicines or nicotine-replacement products, like gum or the patch, may make quitting easier.  Postnasal drip  A cough that is worse at night may be due to postnasal drip. Excess mucus in the nose drains from the back of your nose to your throat. This triggers the cough reflex. Postnasal drip may be due to a sinus infection or allergy. Common allergens include dust, tobacco smoke (both inhaled and secondhand smoke), environmental pollutants, pollen, mold, pets, cleaning agents, room deodorizers, and chemical fumes. Over-the-counter antihistamines or decongestants may be helpful for allergies. A sinus infection may requires antibiotic treatment. See your healthcare provider if symptoms continue.  Medicines  Certain prescribed medicines can cause a chronic cough in some people:  · ACE inhibitors for high blood pressure. These include benazepril, captopril, enalapril, fosinopril, lisinopril, quinapril, ramipril, and others.  · Beta-blockers for high blood pressure and other conditions. These include propranolol, atenolol, metoprolol, nadolol, and others.  Let your healthcare provider know if you are taking any of these.  Asthma  Cough may be the only sign of mild asthma. You may have tests to find out if asthma is causing your cough. You may also take asthma medicine on a trial  basis.  Acid reflux (heartburn, GERD)  The esophagus is the tube that carries food from the mouth to the stomach. A valve at its lower end prevents stomach acids from flowing upward. If this valve does not work properly, acid from the stomach enters the esophagus. This may cause a burning pain in the upper abdomen or lower chest, belching, or cough. Symptoms are often worse when lying flat. Avoid eating or drinking before bedtime. Try using extra pillows to raise your upper body, or place 4-inch blocks under the head of your bed. You may try an over-the-counter antacid or an acid-blocking medicine such as famotidine, cimetidine, ranitidine, esomeprazole, lansoprazole, or omeprazole. Stronger medicines for this condition can be prescribed by your healthcare provider.  Follow-up care  Follow up with your healthcare provider, or as advised, if your cough does not improve. Further testing may be needed.  Note: If an X-ray was taken, a specialist will review it. You will be notified of any new findings that may affect your care.  When to seek medical advice  Call your healthcare provider right away if any of these occur:  · Mild wheezing or difficulty breathing  · Fever of 100.4ºF (38ºC) or higher, or as directed by your healthcare provider  · Unexpected weight loss  · Coughing up large amounts of colored sputum  · Night sweats (sheets and pajamas get soaking wet)  Call 911, or get immediate medical care  Contact emergency services right away if any of these occur:  · Coughing up blood  · Moderate to severe trouble breathing or wheezing  Date Last Reviewed: 9/13/2015 © 2000-2017 [employer]. [address] [place] [address], [place] [address]. All rights reserved. This information is not intended as a substitute for professional medical care. Always follow your healthcare professional's instructions.

## 2019-03-19 NOTE — PROGRESS NOTES
"Subjective:       Patient ID: Josiah Richter is a 63 y.o. male.    Vitals:  height is 5' 7" (1.702 m) and weight is 68.5 kg (151 lb). His tympanic temperature is 97.3 °F (36.3 °C). His blood pressure is 118/61 and his pulse is 106. His respiration is 16 and oxygen saturation is 98%.     Chief Complaint: URI    Patient presents ongoing cough, chest congestion, & SOB (due to coughing) that has been going on for the past 3-4 weeks.  He states that he recently finished antibiotics for pneumonia.  He has not been taking any medication for his symptoms. He states that he quit smoking for the past 3 weeks.       URI    This is a recurrent problem. The current episode started 1 to 4 weeks ago. The problem has been unchanged. There has been no fever. Associated symptoms include chest pain (from coughing), congestion, coughing, rhinorrhea and a sore throat. Pertinent negatives include no abdominal pain, diarrhea, dysuria, ear pain, headaches, joint pain, joint swelling, nausea, neck pain, plugged ear sensation, rash, sinus pain, sneezing, swollen glands, vomiting or wheezing. He has tried nothing for the symptoms.       Constitution: Negative for chills, sweating, fatigue and fever.   HENT: Positive for congestion and sore throat. Negative for ear pain, sinus pain, sinus pressure and voice change.    Neck: Negative for neck pain and painful lymph nodes.   Cardiovascular: Positive for chest pain (from coughing).   Eyes: Negative for eye redness.   Respiratory: Positive for cough, sputum production and shortness of breath. Negative for chest tightness, bloody sputum, COPD, stridor, wheezing and asthma.    Gastrointestinal: Negative for abdominal pain, nausea, vomiting and diarrhea.   Genitourinary: Negative for dysuria.   Musculoskeletal: Negative for muscle ache.   Skin: Negative for rash.   Allergic/Immunologic: Negative for seasonal allergies, asthma and sneezing.   Neurological: Negative for headaches. "   Hematologic/Lymphatic: Negative for swollen lymph nodes.       Objective:      Physical Exam   Constitutional: He is oriented to person, place, and time. He appears well-developed and well-nourished. He is cooperative.  Non-toxic appearance. He does not have a sickly appearance. He does not appear ill. No distress.   HENT:   Head: Normocephalic and atraumatic.   Right Ear: Hearing, tympanic membrane, external ear and ear canal normal.   Left Ear: Hearing, tympanic membrane, external ear and ear canal normal.   Nose: Nose normal. No mucosal edema, rhinorrhea or nasal deformity. No epistaxis. Right sinus exhibits no maxillary sinus tenderness and no frontal sinus tenderness. Left sinus exhibits no maxillary sinus tenderness and no frontal sinus tenderness.   Mouth/Throat: Uvula is midline and mucous membranes are normal. No trismus in the jaw. Normal dentition. No uvula swelling. Posterior oropharyngeal erythema present. No posterior oropharyngeal edema.       Eyes: Conjunctivae, EOM and lids are normal. Pupils are equal, round, and reactive to light. No scleral icterus.   Sclera clear bilat   Neck: Trachea normal, normal range of motion, full passive range of motion without pain and phonation normal. Neck supple.   Cardiovascular: Normal rate, regular rhythm, S1 normal, S2 normal, normal heart sounds, intact distal pulses and normal pulses.   Pulmonary/Chest: Effort normal and breath sounds normal. No respiratory distress. He has no decreased breath sounds. He has no rhonchi. He has no rales.   Abdominal: Soft. Normal appearance and bowel sounds are normal. He exhibits no distension. There is no tenderness.   Musculoskeletal: Normal range of motion. He exhibits no edema or deformity.   Lymphadenopathy:     He has no cervical adenopathy.   Neurological: He is alert and oriented to person, place, and time. He exhibits normal muscle tone. Coordination normal.   Skin: Skin is warm, dry and intact. No rash noted. He is  not diaphoretic. No pallor.   Psychiatric: His speech is normal and behavior is normal. Judgment and thought content normal. His mood appears anxious (mildly anxious walking in and out of the exam room). Cognition and memory are normal.   Nursing note and vitals reviewed.      Assessment:       1. Persistent cough for 3 weeks or longer        Plan:       Patient adamant about cough syrup that could help him sleep stating he has been on antibiotic after antibiotic without relief.   Reports he does not have Bronchitis but was recently treated for Pneumonia.  Advised patient that I would no recommend anything with sedative effects based on his current medication regimen.  Patient left the exam room before finishing with discharge instructions. Was able to give his paperwork before he left the urgent care.     Persistent cough for 3 weeks or longer  -     Ambulatory referral to Internal Medicine  -     benzonatate (TESSALON PERLES) 100 MG capsule; Take 1 capsule (100 mg total) by mouth 3 (three) times daily as needed for Cough.  Dispense: 30 capsule; Refill: 1      Patient Instructions     General Referral to [facility]     You were referred to [facility] to Establish Care.    Please call [phone] to schedule your appointment.    Please go to the Emergency Department for any concerns or worsening of condition.  Please follow up with your primary care doctor or specialist in the next 48-72hrs as needed.    If you  smoke, please stop smoking.    Cough   If your condition worsens or fails to improve we recommend that you receive another evaluation at the ER immediately or contact your PCP to discuss your concerns or return here. You must understand that you've received an urgent care treatment only and that you may be released before all your medical problems are known or treated. You the patient will arrange for follouwp care as instructed. .  Rest and fluids are important  Can use honey with marlena to  "soothe your throat  Take prescription cough meds (pills) as prescribed;  Do not any other over the counter cough meds during the time you take the prescribed cough pills .   -  Flonase (fluticasone) is a nasal spray which is available over the counter and may help with your symptoms.   -  If you have hypertension avoid using the "D" which is the decongestant.  Instead you can use Coricidin HBP for cold and cough symptoms.    -  If you just have drainage you can take plain Zyrtec, Claritin or Allegra   -  Tylenol or ibuprofen can also be used as directed for pain unless you have an allergy to them or medical condition such as stomach ulcers, kidney or liver disease or blood thinners etc for which you should not be taking these type of medications.   Please follow up with your primary care doctor or specialist in the next 48-72hrs as discussed  If you  smoke, please stop smoking.  Cough, Chronic, Uncertain Cause (Adult)    Everyone has had a cough as part of the common cold, flu, or bronchitis. This kind of cough occurs along with an achy feeling, low-grade fever, nasal and sinus congestion, and a scratchy or sore throat. This usually gets better in 2 to 3 weeks. A cough that lasts longer than 3 weeks may be due to other causes.  If your cough does not improve over the next 2 weeks, further testing may be needed. Follow up with your healthcare provider as advised. Cough suppressants may be recommended. Based on your exam today, the exact cause of your cough is not certain. Below are some common causes for persistent cough.  Smokers cough  Smokers cough doesnt go away. If you continue to smoke, it only gets worse. The cough is from irritation in the air passages. Talk to your healthcare provider about quitting. Medicines or nicotine-replacement products, like gum or the patch, may make quitting easier.  Postnasal drip  A cough that is worse at night may be due to postnasal drip. Excess mucus in the nose drains from " the back of your nose to your throat. This triggers the cough reflex. Postnasal drip may be due to a sinus infection or allergy. Common allergens include dust, tobacco smoke (both inhaled and secondhand smoke), environmental pollutants, pollen, mold, pets, cleaning agents, room deodorizers, and chemical fumes. Over-the-counter antihistamines or decongestants may be helpful for allergies. A sinus infection may requires antibiotic treatment. See your healthcare provider if symptoms continue.  Medicines  Certain prescribed medicines can cause a chronic cough in some people:  · ACE inhibitors for high blood pressure. These include benazepril, captopril, enalapril, fosinopril, lisinopril, quinapril, ramipril, and others.  · Beta-blockers for high blood pressure and other conditions. These include propranolol, atenolol, metoprolol, nadolol, and others.  Let your healthcare provider know if you are taking any of these.  Asthma  Cough may be the only sign of mild asthma. You may have tests to find out if asthma is causing your cough. You may also take asthma medicine on a trial basis.  Acid reflux (heartburn, GERD)  The esophagus is the tube that carries food from the mouth to the stomach. A valve at its lower end prevents stomach acids from flowing upward. If this valve does not work properly, acid from the stomach enters the esophagus. This may cause a burning pain in the upper abdomen or lower chest, belching, or cough. Symptoms are often worse when lying flat. Avoid eating or drinking before bedtime. Try using extra pillows to raise your upper body, or place 4-inch blocks under the head of your bed. You may try an over-the-counter antacid or an acid-blocking medicine such as famotidine, cimetidine, ranitidine, esomeprazole, lansoprazole, or omeprazole. Stronger medicines for this condition can be prescribed by your healthcare provider.  Follow-up care  Follow up with your healthcare provider, or as advised, if your  cough does not improve. Further testing may be needed.  Note: If an X-ray was taken, a specialist will review it. You will be notified of any new findings that may affect your care.  When to seek medical advice  Call your healthcare provider right away if any of these occur:  · Mild wheezing or difficulty breathing  · Fever of 100.4ºF (38ºC) or higher, or as directed by your healthcare provider  · Unexpected weight loss  · Coughing up large amounts of colored sputum  · Night sweats (sheets and pajamas get soaking wet)  Call 911, or get immediate medical care  Contact emergency services right away if any of these occur:  · Coughing up blood  · Moderate to severe trouble breathing or wheezing  Date Last Reviewed: 9/13/2015 © 2000-2017 [employer]. [address] [place] [address], [place] [address]. All rights reserved. This information is not intended as a substitute for professional medical care. Always follow your healthcare professional's instructions.

## 2019-03-22 ENCOUNTER — TELEPHONE (OUTPATIENT)
Dept: URGENT CARE | Facility: CLINIC | Age: 64
End: 2019-03-22

## 2019-03-22 DIAGNOSIS — E11.9 TYPE 2 DIABETES MELLITUS WITHOUT COMPLICATION, UNSPECIFIED WHETHER LONG TERM INSULIN USE: ICD-10-CM

## 2019-03-22 NOTE — TELEPHONE ENCOUNTER
Called to follow up with patient. Left a VM for him to give us a call if he has any questions or concerns.

## 2019-04-01 ENCOUNTER — TELEPHONE (OUTPATIENT)
Dept: ADMINISTRATIVE | Facility: HOSPITAL | Age: 64
End: 2019-04-01

## 2019-04-01 ENCOUNTER — PATIENT OUTREACH (OUTPATIENT)
Dept: ADMINISTRATIVE | Facility: HOSPITAL | Age: 64
End: 2019-04-01

## 2019-04-01 DIAGNOSIS — Z12.11 SCREENING FOR COLORECTAL CANCER: ICD-10-CM

## 2019-04-01 DIAGNOSIS — E11.9 ENCOUNTER FOR DIABETIC FOOT EXAM: Primary | ICD-10-CM

## 2019-04-01 DIAGNOSIS — Z12.12 SCREENING FOR COLORECTAL CANCER: ICD-10-CM

## 2019-04-01 PROBLEM — G89.29 CHRONIC BACK PAIN: Status: ACTIVE | Noted: 2019-04-01

## 2019-04-01 PROBLEM — M54.9 CHRONIC BACK PAIN: Status: ACTIVE | Noted: 2019-04-01

## 2019-04-01 NOTE — TELEPHONE ENCOUNTER
The patient was phoned about scheduling his eye exam and labwork. The phone was busy. I will send a letter for the patient.    Katherine NEVAREZ LPN  Clinical Care Coordinator  Internal Medicine  [facility]

## 2019-05-01 LAB
ACID FAST MOD KINY STN SPEC: NORMAL
MYCOBACTERIUM SPEC QL CULT: NORMAL

## 2019-05-03 DIAGNOSIS — Z12.11 COLON CANCER SCREENING: ICD-10-CM

## 2019-06-07 PROBLEM — N17.9 AKI (ACUTE KIDNEY INJURY): Status: ACTIVE | Noted: 2019-06-07

## 2019-06-07 PROBLEM — F19.10 POLYSUBSTANCE ABUSE: Status: ACTIVE | Noted: 2019-06-07

## 2019-06-07 PROBLEM — R56.9 SEIZURE: Status: ACTIVE | Noted: 2019-06-07

## 2019-06-07 PROBLEM — R74.01 TRANSAMINITIS: Status: ACTIVE | Noted: 2019-06-07

## 2019-06-08 ENCOUNTER — HOSPITAL ENCOUNTER (INPATIENT)
Facility: HOSPITAL | Age: 64
LOS: 1 days | Discharge: LEFT BEFORE DISCHARGE | DRG: 880 | End: 2019-06-08
Attending: EMERGENCY MEDICINE | Admitting: HOSPITALIST
Payer: MEDICARE

## 2019-06-08 VITALS
RESPIRATION RATE: 20 BRPM | OXYGEN SATURATION: 96 % | DIASTOLIC BLOOD PRESSURE: 98 MMHG | SYSTOLIC BLOOD PRESSURE: 158 MMHG | TEMPERATURE: 98 F | HEART RATE: 98 BPM

## 2019-06-08 DIAGNOSIS — R56.9 SEIZURE: ICD-10-CM

## 2019-06-08 PROBLEM — F44.5 PSYCHOGENIC NONEPILEPTIC SEIZURE: Status: ACTIVE | Noted: 2019-06-07

## 2019-06-08 LAB — POCT GLUCOSE: 95 MG/DL (ref 70–110)

## 2019-06-08 PROCEDURE — 11000001 HC ACUTE MED/SURG PRIVATE ROOM

## 2019-06-08 PROCEDURE — 25000003 PHARM REV CODE 250: Performed by: HOSPITALIST

## 2019-06-08 PROCEDURE — 63600175 PHARM REV CODE 636 W HCPCS: Performed by: PHYSICIAN ASSISTANT

## 2019-06-08 PROCEDURE — 94761 N-INVAS EAR/PLS OXIMETRY MLT: CPT

## 2019-06-08 PROCEDURE — 99284 PR EMERGENCY DEPT VISIT,LEVEL IV: ICD-10-PCS | Mod: GC,,, | Performed by: EMERGENCY MEDICINE

## 2019-06-08 PROCEDURE — 99285 EMERGENCY DEPT VISIT HI MDM: CPT | Mod: 25

## 2019-06-08 PROCEDURE — 99284 EMERGENCY DEPT VISIT MOD MDM: CPT | Mod: GC,,, | Performed by: EMERGENCY MEDICINE

## 2019-06-08 RX ORDER — GLUCAGON 1 MG
1 KIT INJECTION
Status: DISCONTINUED | OUTPATIENT
Start: 2019-06-08 | End: 2019-06-09 | Stop reason: HOSPADM

## 2019-06-08 RX ORDER — ONDANSETRON 4 MG/1
4 TABLET, ORALLY DISINTEGRATING ORAL EVERY 6 HOURS PRN
Status: DISCONTINUED | OUTPATIENT
Start: 2019-06-08 | End: 2019-06-09 | Stop reason: HOSPADM

## 2019-06-08 RX ORDER — LEVETIRACETAM 500 MG/1
500 TABLET ORAL 2 TIMES DAILY
Status: DISCONTINUED | OUTPATIENT
Start: 2019-06-08 | End: 2019-06-09 | Stop reason: HOSPADM

## 2019-06-08 RX ORDER — SODIUM CHLORIDE 0.9 % (FLUSH) 0.9 %
10 SYRINGE (ML) INJECTION
Status: DISCONTINUED | OUTPATIENT
Start: 2019-06-08 | End: 2019-06-09 | Stop reason: HOSPADM

## 2019-06-08 RX ORDER — SODIUM CHLORIDE 0.9 % (FLUSH) 0.9 %
10 SYRINGE (ML) INJECTION
Status: DISCONTINUED | OUTPATIENT
Start: 2019-06-08 | End: 2019-06-08

## 2019-06-08 RX ORDER — THIAMINE HCL 100 MG
100 TABLET ORAL DAILY
Status: DISCONTINUED | OUTPATIENT
Start: 2019-06-09 | End: 2019-06-08

## 2019-06-08 RX ORDER — HYDROXYZINE PAMOATE 25 MG/1
25 CAPSULE ORAL EVERY 8 HOURS PRN
Status: DISCONTINUED | OUTPATIENT
Start: 2019-06-08 | End: 2019-06-09 | Stop reason: HOSPADM

## 2019-06-08 RX ORDER — ACETAMINOPHEN 325 MG/1
650 TABLET ORAL EVERY 4 HOURS PRN
Status: DISCONTINUED | OUTPATIENT
Start: 2019-06-08 | End: 2019-06-09 | Stop reason: HOSPADM

## 2019-06-08 RX ORDER — SODIUM CHLORIDE 9 MG/ML
INJECTION, SOLUTION INTRAVENOUS CONTINUOUS
Status: DISCONTINUED | OUTPATIENT
Start: 2019-06-08 | End: 2019-06-09 | Stop reason: HOSPADM

## 2019-06-08 RX ORDER — INSULIN ASPART 100 [IU]/ML
1-10 INJECTION, SOLUTION INTRAVENOUS; SUBCUTANEOUS
Status: DISCONTINUED | OUTPATIENT
Start: 2019-06-08 | End: 2019-06-09 | Stop reason: HOSPADM

## 2019-06-08 RX ORDER — IBUPROFEN 200 MG
24 TABLET ORAL
Status: DISCONTINUED | OUTPATIENT
Start: 2019-06-08 | End: 2019-06-09 | Stop reason: HOSPADM

## 2019-06-08 RX ORDER — DIPHENHYDRAMINE HYDROCHLORIDE 50 MG/ML
50 INJECTION INTRAMUSCULAR; INTRAVENOUS ONCE
Status: COMPLETED | OUTPATIENT
Start: 2019-06-08 | End: 2019-06-08

## 2019-06-08 RX ORDER — HALOPERIDOL 5 MG/ML
5 INJECTION INTRAMUSCULAR ONCE
Status: COMPLETED | OUTPATIENT
Start: 2019-06-08 | End: 2019-06-08

## 2019-06-08 RX ORDER — ALBUTEROL SULFATE 90 UG/1
2 AEROSOL, METERED RESPIRATORY (INHALATION) EVERY 6 HOURS PRN
Status: DISCONTINUED | OUTPATIENT
Start: 2019-06-08 | End: 2019-06-09 | Stop reason: HOSPADM

## 2019-06-08 RX ORDER — IBUPROFEN 200 MG
16 TABLET ORAL
Status: DISCONTINUED | OUTPATIENT
Start: 2019-06-08 | End: 2019-06-09 | Stop reason: HOSPADM

## 2019-06-08 RX ORDER — ENOXAPARIN SODIUM 100 MG/ML
40 INJECTION SUBCUTANEOUS EVERY 24 HOURS
Status: DISCONTINUED | OUTPATIENT
Start: 2019-06-09 | End: 2019-06-09 | Stop reason: HOSPADM

## 2019-06-08 RX ORDER — LORAZEPAM 2 MG/ML
2 INJECTION INTRAMUSCULAR
Status: DISCONTINUED | OUTPATIENT
Start: 2019-06-08 | End: 2019-06-09 | Stop reason: HOSPADM

## 2019-06-08 RX ADMIN — LEVETIRACETAM 500 MG: 500 TABLET ORAL at 08:06

## 2019-06-08 RX ADMIN — DIPHENHYDRAMINE HYDROCHLORIDE 50 MG: 50 INJECTION INTRAMUSCULAR; INTRAVENOUS at 08:06

## 2019-06-08 RX ADMIN — SODIUM CHLORIDE: 0.9 INJECTION, SOLUTION INTRAVENOUS at 07:06

## 2019-06-08 RX ADMIN — HALOPERIDOL LACTATE 5 MG: 5 INJECTION, SOLUTION INTRAMUSCULAR at 08:06

## 2019-06-08 RX ADMIN — HYDROXYZINE PAMOATE 25 MG: 25 CAPSULE ORAL at 07:06

## 2019-06-08 NOTE — ASSESSMENT & PLAN NOTE
Per Psychiatry:  Per discussion with patient's psychiatrist, Dr. Nahid Gardner, the patient has a habit of MD shopping and overusing Xanax, and should not be restarted on Xanax while in the hospital  Can use Vistaril 25-50mg PO Q6H PRN anxiety (not to exceed 100mg in 24-hour period)  Continue Ativan PRN seizures or unstable vitals

## 2019-06-08 NOTE — ASSESSMENT & PLAN NOTE
Last A1C WNL back in Feb 2019; check A1C  Not on any insulin or oral medications   Low dose correction scale   Cont blood glucose monitoring   BG goal:  Preprandial blood glucose target <140 mg/dL  Random glucoses <180 mg/dL  Avoid hypoglycemia   ADA diet

## 2019-06-08 NOTE — SUBJECTIVE & OBJECTIVE
Past Medical History:   Diagnosis Date    Anxiety     Diabetes mellitus     Diabetes mellitus, type 2     HCV (hepatitis C virus)     Kidney stone     s/p L ureteral stent placement on 2018. Infection cleared and he subsequently had a stent removal    Urinary tract infection        Past Surgical History:   Procedure Laterality Date    KIDNEY STONE SURGERY      NECK SURGERY      secondary to previous fall       Review of patient's allergies indicates:   Allergen Reactions    Mayonnaise Anaphylaxis       Current Facility-Administered Medications on File Prior to Encounter   Medication    [] lorazepam (ATIVAN) 2 mg/mL injection    [COMPLETED] lorazepam injection 2 mg    [DISCONTINUED] 0.9%  NaCl infusion    [DISCONTINUED] acetaminophen tablet 650 mg    [DISCONTINUED] dextrose 10% (D10W) Bolus    [DISCONTINUED] dextrose 10% (D10W) Bolus    [DISCONTINUED] dextrose 50% injection 12.5 g    [DISCONTINUED] dextrose 50% injection 12.5 g    [DISCONTINUED] dextrose 50% injection 25 g    [DISCONTINUED] dextrose 50% injection 25 g    [DISCONTINUED] enoxaparin injection 40 mg    [DISCONTINUED] glucagon (human recombinant) injection 1 mg    [DISCONTINUED] glucagon (human recombinant) injection 1 mg    [DISCONTINUED] glucose chewable tablet 16 g    [DISCONTINUED] glucose chewable tablet 16 g    [DISCONTINUED] glucose chewable tablet 24 g    [DISCONTINUED] glucose chewable tablet 24 g    [DISCONTINUED] insulin aspart U-100 pen 1-10 Units    [DISCONTINUED] levETIRAcetam tablet 500 mg    [DISCONTINUED] lorazepam injection 2 mg    [DISCONTINUED] ondansetron disintegrating tablet 4 mg    [DISCONTINUED] sodium chloride 0.9% flush 10 mL    [DISCONTINUED] thiamine (B-1) 100 mg in dextrose 5 % 50 mL IVPB    [DISCONTINUED] thiamine (B-1) 250 mg in dextrose 5 % 50 mL IVPB     Current Outpatient Medications on File Prior to Encounter   Medication Sig    albuterol (PROVENTIL/VENTOLIN HFA) 90  mcg/actuation inhaler Inhale 1-2 puffs into the lungs every 6 (six) hours as needed for Wheezing. Rescue    benzonatate (TESSALON PERLES) 100 MG capsule Take 1 capsule (100 mg total) by mouth 3 (three) times daily as needed for Cough.    ibuprofen (ADVIL,MOTRIN) 800 MG tablet Take 1 tablet (800 mg total) by mouth every 6 (six) hours as needed for Pain.    ipratropium (ATROVENT) 42 mcg (0.06 %) nasal spray 2 sprays by Nasal route 4 (four) times daily.    SUBOXONE 8-2 mg Film DISSOLVE 1 FILM UNT TID     Family History     Problem Relation (Age of Onset)    Coronary artery disease Father    Diabetes Mother    Hypertension Mother    Stroke Mother        Tobacco Use    Smoking status: Current Every Day Smoker     Packs/day: 1.50     Years: 40.00     Pack years: 60.00     Types: Cigarettes     Start date:      Last attempt to quit: 2019     Years since quittin.2    Smokeless tobacco: Never Used   Substance and Sexual Activity    Alcohol use: No    Drug use: Yes     Types: Oxycodone    Sexual activity: Not Currently     Review of Systems   Constitutional: Negative for chills, fatigue and fever.   HENT: Negative for congestion, facial swelling, hearing loss and trouble swallowing.    Eyes: Negative for photophobia and visual disturbance.   Respiratory: Negative for chest tightness, shortness of breath and wheezing.    Cardiovascular: Negative for chest pain, palpitations and leg swelling.   Gastrointestinal: Negative for abdominal pain, blood in stool, constipation, diarrhea, nausea and vomiting.   Endocrine: Negative.    Genitourinary: Negative.    Musculoskeletal: Negative for back pain, joint swelling and myalgias.   Skin: Negative.    Allergic/Immunologic: Negative.    Neurological: Negative for dizziness, facial asymmetry, speech difficulty, weakness and numbness.   Hematological: Negative.    Psychiatric/Behavioral: Negative for agitation, confusion and dysphoric mood. The patient is not  nervous/anxious.      Objective:     Vital Signs (Most Recent):  Pulse: 77 (06/08/19 1431)  BP: 131/75 (06/08/19 1431)  SpO2: 97 % (06/08/19 1431) Vital Signs (24h Range):  Temp:  [96.3 °F (35.7 °C)-98.4 °F (36.9 °C)] 98.4 °F (36.9 °C)  Pulse:  [65-86] 77  Resp:  [16-18] 16  SpO2:  [96 %-99 %] 97 %  BP: (108-134)/(56-92) 131/75        There is no height or weight on file to calculate BMI.    Physical Exam   Constitutional: He is oriented to person, place, and time. He appears well-developed and well-nourished. He appears cachectic. No distress.   Lying in bed awake and alert  Cachetic   disheveled   Chronically ill appearing    HENT:   Head: Normocephalic and atraumatic.   Mouth/Throat: Abnormal dentition.   Eyes: Pupils are equal, round, and reactive to light. No scleral icterus.   Neck: Normal range of motion.   Cardiovascular: Normal rate, regular rhythm and normal heart sounds.   No murmur heard.  Pulmonary/Chest: Effort normal and breath sounds normal. No respiratory distress. He has no wheezes.   Abdominal: Soft. Bowel sounds are normal. He exhibits no distension. There is no tenderness.   Musculoskeletal: He exhibits no edema or deformity.   Neurological: He is alert and oriented to person, place, and time.   Skin: Skin is warm and dry. He is not diaphoretic. No erythema.   Psychiatric: He has a normal mood and affect. His behavior is normal. Judgment and thought content normal.         CRANIAL NERVES     CN III, IV, VI   Pupils are equal, round, and reactive to light.       Significant Labs: All pertinent labs within the past 24 hours have been reviewed.    Significant Imaging: I have reviewed all pertinent imaging results/findings within the past 24 hours.

## 2019-06-08 NOTE — ASSESSMENT & PLAN NOTE
LFT's mildly elevated  No signs of acute decompensation  No acute issues   Follow up as outpatient with hepatology

## 2019-06-08 NOTE — ASSESSMENT & PLAN NOTE
"History suggestive of pseudoseizures   Unlikely benzo withdrawal seizures  CT head: No acute infarct or hemorrhage or mass or fracture.  UA positive for trace leukocytes  UDS positive for benzodiazepines  ammonia within normal limits  CBC with normal white count, hemoglobin and platelets  CMP with slight elevation of Sr Cr, bicarbonate 17. Improved.   ETOH is negative  CPK , TSH WNL  Prolactin slightly low   Given keppra 1g IV in the ER  Continue keppra 500mg BID   Seizure precautions   Neuro ICU consult - Patient stable for floor per staff  Neurology consult  1. EEG - official read pending. But per neurology note "No electrographic evidence of seizures"  2. No further recommendations at this time as patient w/o electrographic seizures  3. Cont Ativan PRN for seizures 2-3 minutes or for recurrent seizures without return to cognitive baseline  "

## 2019-06-08 NOTE — H&P
[facility] Medicine  History & Physical    Patient Name: Josiah Richetr  MRN: [id number]  Admission Date: 6/8/2019  Attending Physician: Yoko Nunez MD   Primary Care Provider: Tierra Max MD    [facility] Medicine Team: [facility] MED  Cristina Abdi MD     Patient information was obtained from patient and ER records.     Subjective:     Principal Problem:<principal problem not specified>    Chief Complaint:   Chief Complaint   Patient presents with    Altered Mental Status     Shaking in wheelchair, eyes open, Not answering questions.         HPI: The patient is a 64 y/o male with PMH of T2DM, polysubstance abuse, and HCV who presents with seizures. Patient is a poor historian and no family at bedside. Thus, history taken mainly from chart review, nursing documention and ER physician report. He started shaking while in the ER lobby at around 12:55 PM then it recurred at 1 PM. Per ED reports, he was found to have keppra and xanax prescriptions in his possession with prescription date of 6/5. ER physician noted the following:     Patient with shaking of all his extremities, not urging of his back, no incontinence, no tongue biting.  At 1 point he was holding his right upper extremity up in the air and then his right upper extremity.  He was able to grab the railing and shake with the railing. Eyes were open not following commands however, he closed the close his eyes to protect his eyes from my hand. When he woke up he was not drowsy but confused to place and recent facts.  Patient had another episode in the emergency department similar to the initial shaking episode, shorter lasted, he was lying on the left side in bed with mild shaking of the upper extremities. He was able to open his eyes and answer questions when I arrived in the room.     At [facility] on May18 it was documented the patient was discharged with Xanax taper of 0.5 mg b.i.d. however patient reports that he is still on  Xanax and he gets prescription for 60 or 30 days, he thinks the last dose of Xanax was 3 days ago.     Patient was agitated and angry about not receiving Xanax. Per psychiatry, patient should not be receiving Xanax. There is no hemodynamic evidence of withdraws. Seizure like episodes suspicious of malingering. Patient refused EEG monitoring and took off leads in order to capture events. Patient decided to leave hospital AMA. Now he presents back to ED with shaking episodes after leaving AMA.      Past Medical History:   Diagnosis Date    Anxiety     Diabetes mellitus     Diabetes mellitus, type 2     HCV (hepatitis C virus)     Kidney stone     s/p L ureteral stent placement on 2018. Infection cleared and he subsequently had a stent removal    Urinary tract infection        Past Surgical History:   Procedure Laterality Date    KIDNEY STONE SURGERY      NECK SURGERY      secondary to previous fall       Review of patient's allergies indicates:   Allergen Reactions    Mayonnaise Anaphylaxis       Current Facility-Administered Medications on File Prior to Encounter   Medication    [] lorazepam (ATIVAN) 2 mg/mL injection    [COMPLETED] lorazepam injection 2 mg    [DISCONTINUED] 0.9%  NaCl infusion    [DISCONTINUED] acetaminophen tablet 650 mg    [DISCONTINUED] dextrose 10% (D10W) Bolus    [DISCONTINUED] dextrose 10% (D10W) Bolus    [DISCONTINUED] dextrose 50% injection 12.5 g    [DISCONTINUED] dextrose 50% injection 12.5 g    [DISCONTINUED] dextrose 50% injection 25 g    [DISCONTINUED] dextrose 50% injection 25 g    [DISCONTINUED] enoxaparin injection 40 mg    [DISCONTINUED] glucagon (human recombinant) injection 1 mg    [DISCONTINUED] glucagon (human recombinant) injection 1 mg    [DISCONTINUED] glucose chewable tablet 16 g    [DISCONTINUED] glucose chewable tablet 16 g    [DISCONTINUED] glucose chewable tablet 24 g    [DISCONTINUED] glucose chewable tablet 24 g     [DISCONTINUED] insulin aspart U-100 pen 1-10 Units    [DISCONTINUED] levETIRAcetam tablet 500 mg    [DISCONTINUED] lorazepam injection 2 mg    [DISCONTINUED] ondansetron disintegrating tablet 4 mg    [DISCONTINUED] sodium chloride 0.9% flush 10 mL    [DISCONTINUED] thiamine (B-1) 100 mg in dextrose 5 % 50 mL IVPB    [DISCONTINUED] thiamine (B-1) 250 mg in dextrose 5 % 50 mL IVPB     Current Outpatient Medications on File Prior to Encounter   Medication Sig    albuterol (PROVENTIL/VENTOLIN HFA) 90 mcg/actuation inhaler Inhale 1-2 puffs into the lungs every 6 (six) hours as needed for Wheezing. Rescue    benzonatate (TESSALON PERLES) 100 MG capsule Take 1 capsule (100 mg total) by mouth 3 (three) times daily as needed for Cough.    ibuprofen (ADVIL,MOTRIN) 800 MG tablet Take 1 tablet (800 mg total) by mouth every 6 (six) hours as needed for Pain.    ipratropium (ATROVENT) 42 mcg (0.06 %) nasal spray 2 sprays by Nasal route 4 (four) times daily.    SUBOXONE 8-2 mg Film DISSOLVE 1 FILM UNT TID     Family History     Problem Relation (Age of Onset)    Coronary artery disease Father    Diabetes Mother    Hypertension Mother    Stroke Mother        Tobacco Use    Smoking status: Current Every Day Smoker     Packs/day: 1.50     Years: 40.00     Pack years: 60.00     Types: Cigarettes     Start date:      Last attempt to quit: 2019     Years since quittin.2    Smokeless tobacco: Never Used   Substance and Sexual Activity    Alcohol use: No    Drug use: Yes     Types: Oxycodone    Sexual activity: Not Currently     Review of Systems   Constitutional: Negative for chills, fatigue and fever.   HENT: Negative for congestion, facial swelling, hearing loss and trouble swallowing.    Eyes: Negative for photophobia and visual disturbance.   Respiratory: Negative for chest tightness, shortness of breath and wheezing.    Cardiovascular: Negative for chest pain, palpitations and leg swelling.    Gastrointestinal: Negative for abdominal pain, blood in stool, constipation, diarrhea, nausea and vomiting.   Endocrine: Negative.    Genitourinary: Negative.    Musculoskeletal: Negative for back pain, joint swelling and myalgias.   Skin: Negative.    Allergic/Immunologic: Negative.    Neurological: Negative for dizziness, facial asymmetry, speech difficulty, weakness and numbness.   Hematological: Negative.    Psychiatric/Behavioral: Negative for agitation, confusion and dysphoric mood. The patient is not nervous/anxious.      Objective:     Vital Signs (Most Recent):  Pulse: 77 (06/08/19 1431)  BP: 131/75 (06/08/19 1431)  SpO2: 97 % (06/08/19 1431) Vital Signs (24h Range):  Temp:  [96.3 °F (35.7 °C)-98.4 °F (36.9 °C)] 98.4 °F (36.9 °C)  Pulse:  [65-86] 77  Resp:  [16-18] 16  SpO2:  [96 %-99 %] 97 %  BP: (108-134)/(56-92) 131/75        There is no height or weight on file to calculate BMI.    Physical Exam   Constitutional: He is oriented to person, place, and time. He appears well-developed and well-nourished. He appears cachectic. No distress.   Lying in bed awake and alert  Cachetic   disheveled   Chronically ill appearing    HENT:   Head: Normocephalic and atraumatic.   Mouth/Throat: Abnormal dentition.   Eyes: Pupils are equal, round, and reactive to light. No scleral icterus.   Neck: Normal range of motion.   Cardiovascular: Normal rate, regular rhythm and normal heart sounds.   No murmur heard.  Pulmonary/Chest: Effort normal and breath sounds normal. No respiratory distress. He has no wheezes.   Abdominal: Soft. Bowel sounds are normal. He exhibits no distension. There is no tenderness.   Musculoskeletal: He exhibits no edema or deformity.   Neurological: He is alert and oriented to person, place, and time.   Skin: Skin is warm and dry. He is not diaphoretic. No erythema.   Psychiatric: He has a normal mood and affect. His behavior is normal. Judgment and thought content normal.         CRANIAL NERVES  "    CN III, IV, VI   Pupils are equal, round, and reactive to light.       Significant Labs: All pertinent labs within the past 24 hours have been reviewed.    Significant Imaging: I have reviewed all pertinent imaging results/findings within the past 24 hours.    Assessment/Plan:     Seizure  History suggestive of pseudoseizures   Unlikely benzo withdrawal seizures  CT head: No acute infarct or hemorrhage or mass or fracture.  UA positive for trace leukocytes  UDS positive for benzodiazepines  ammonia within normal limits  CBC with normal white count, hemoglobin and platelets  CMP with slight elevation of Sr Cr, bicarbonate 17. Improved.   ETOH is negative  CPK , TSH WNL  Prolactin slightly low   Given keppra 1g IV in the ER  Continue keppra 500mg BID   Seizure precautions   Neuro ICU consult - Patient stable for floor per staff  Neurology consult  1. EEG - official read pending. But per neurology note "No electrographic evidence of seizures"  2. No further recommendations at this time as patient w/o electrographic seizures  3. Cont Ativan PRN for seizures 2-3 minutes or for recurrent seizures without return to cognitive baseline    Polysubstance abuse  Per Psychiatry:  Per discussion with patient's psychiatrist, Dr. Nahid Gardner, the patient has a habit of MD shopping and overusing Xanax, and should not be restarted on Xanax while in the hospital  Can use Vistaril 25-50mg PO Q6H PRN anxiety (not to exceed 100mg in 24-hour period)  Continue Ativan PRN seizures or unstable vitals    Chronic hepatitis C  LFT's mildly elevated  No signs of acute decompensation  No acute issues   Follow up as outpatient with hepatology      COPD (chronic obstructive pulmonary disease)  No active wheezes  Duo nebs and oxygen PRN      Type 2 diabetes mellitus, without long-term current use of insulin  Last A1C WNL back in Feb 2019; check A1C  Not on any insulin or oral medications   Low dose correction scale   Cont blood glucose " monitoring   BG goal:  Preprandial blood glucose target <140 mg/dL  Random glucoses <180 mg/dL  Avoid hypoglycemia   ADA diet        VTE Risk Mitigation (From admission, onward)        Ordered     enoxaparin injection 40 mg  Daily      06/08/19 1547     IP VTE LOW RISK PATIENT  Once      06/08/19 1547     Place ARON hose  Until discontinued      06/08/19 1547     Place sequential compression device  Until discontinued      06/08/19 1547             Cristina Abdi MD  Department of Hospital Medicine   [facility]

## 2019-06-08 NOTE — ED NOTES
Bed: [facility]  Expected date:   Expected time:   Means of arrival:   Comments:

## 2019-06-08 NOTE — HPI
The patient is a 62 y/o male with PMH of T2DM, polysubstance abuse, and HCV who presents with seizures. Patient is a poor historian and no family at bedside. Thus, history taken mainly from chart review, nursing documention and ER physician report. He started shaking while in the ER lobby at around 12:55 PM then it recurred at 1 PM. Per ED reports, he was found to have keppra and xanax prescriptions in his possession with prescription date of 6/5. ER physician noted the following:     Patient with shaking of all his extremities, not urging of his back, no incontinence, no tongue biting.  At 1 point he was holding his right upper extremity up in the air and then his right upper extremity.  He was able to grab the railing and shake with the railing. Eyes were open not following commands however, he closed the close his eyes to protect his eyes from my hand. When he woke up he was not drowsy but confused to place and recent facts.  Patient had another episode in the emergency department similar to the initial shaking episode, shorter lasted, he was lying on the left side in bed with mild shaking of the upper extremities. He was able to open his eyes and answer questions when I arrived in the room.     At [facility] on May18 it was documented the patient was discharged with Xanax taper of 0.5 mg b.i.d. however patient reports that he is still on Xanax and he gets prescription for 60 or 30 days, he thinks the last dose of Xanax was 3 days ago.     Patient was agitated and angry about not receiving Xanax. Per psychiatry, patient should not be receiving Xanax. There is no hemodynamic evidence of withdraws. Seizure like episodes suspicious of malingering. Patient refused EEG monitoring and took off leads in order to capture events. Patient decided to leave hospital AMA. Now he presents back to ED with shaking episodes after leaving AMA.

## 2019-06-08 NOTE — ED PROVIDER NOTES
"Encounter Date: 6/8/2019       History     Chief Complaint   Patient presents with    Altered Mental Status     Shaking in wheelchair, eyes open, Not answering questions.      HPI   63 year old male with DM, HCV, and anxiety who was admitted to the hospital yesterday for seizure-like activity presenting to the ED with altered mental status.  The patient was undergoing evaluation of his seizure-like activity inpatient at this facility.  He left against medical advice 1 hour prior to representation.  He states he left because he was receiving the xanax he was told would be ordered. He was found in the ED waiting room sitting in wheelchair confused and shaking.  The patient says he was in the ED waiting room "trying to get my medicine."  When asked what medication he takes for seizures he was only able to recall xanax. He has no other complaints at this time.  He is agreeable to be readmitted to the hospital for work-up of his seizure-like activity.    Review of patient's allergies indicates:   Allergen Reactions    Mayonnaise Anaphylaxis     Past Medical History:   Diagnosis Date    Anxiety     Diabetes mellitus 2017    Diabetes mellitus, type 2     HCV (hepatitis C virus)     Kidney stone     s/p L ureteral stent placement on 5/29/2018. Infection cleared and he subsequently had a stent removal    Urinary tract infection      Past Surgical History:   Procedure Laterality Date    KIDNEY STONE SURGERY      NECK SURGERY      secondary to previous fall     Family History   Problem Relation Age of Onset    Hypertension Mother     Diabetes Mother     Stroke Mother     Coronary artery disease Father         CABG    Prostate cancer Neg Hx     Kidney disease Neg Hx     Colon cancer Neg Hx      Social History     Tobacco Use    Smoking status: Current Every Day Smoker     Packs/day: 1.50     Years: 40.00     Pack years: 60.00     Types: Cigarettes     Start date: 1973     Last attempt to quit: 2/21/2019     " Years since quittin.3    Smokeless tobacco: Never Used   Substance Use Topics    Alcohol use: No    Drug use: Yes     Types: Oxycodone     Review of Systems   Constitutional: Negative for diaphoresis and fever.   HENT: Negative for sore throat.    Eyes: Negative for visual disturbance.   Respiratory: Negative for chest tightness, shortness of breath and wheezing.    Cardiovascular: Negative for chest pain.   Gastrointestinal: Negative for abdominal pain, diarrhea, nausea and vomiting.   Genitourinary: Negative for dysuria.   Musculoskeletal: Negative for back pain.   Skin: Negative for rash.   Neurological: Positive for seizures. Negative for dizziness, weakness and headaches.   Hematological: Does not bruise/bleed easily.   Psychiatric/Behavioral: Negative for suicidal ideas. The patient is nervous/anxious.        Physical Exam     Initial Vitals   BP Pulse Resp Temp SpO2   19 1431 19 1431 19 1737 19 1737 19 1431   131/75 77 14 98 °F (36.7 °C) 97 %      MAP       --                Physical Exam    Nursing note and vitals reviewed.  Constitutional: He appears well-developed and well-nourished. He is not diaphoretic. No distress.   Disheveled appearing  male, appears older than stated age, sitting on stretcher, anxious-appearing.  No acute distress.   HENT:   Head: Normocephalic and atraumatic.   Nose: Nose normal.   Mouth/Throat: No oropharyngeal exudate.   Eyes: Conjunctivae and EOM are normal. Pupils are equal, round, and reactive to light.   Neck: Normal range of motion.   Cardiovascular: Normal rate, regular rhythm, normal heart sounds and intact distal pulses. Exam reveals no gallop and no friction rub.    No murmur heard.  Pulmonary/Chest: Breath sounds normal. No respiratory distress. He has no wheezes. He has no rhonchi. He has no rales. He exhibits no tenderness.   Abdominal: Soft. Bowel sounds are normal. He exhibits no distension and no mass. There is no  tenderness. There is no guarding.   Musculoskeletal: Normal range of motion. He exhibits no edema or tenderness.   Neurological: GCS eye subscore is 4. GCS verbal subscore is 5. GCS motor subscore is 6.   AAOx3, GCS 15  CN II-XII intact, EOMI, PERRLA, No visual field deficits  Sensation - Grossly intact  Strength   - LUE -  5/5, Arm Flexion 5/5, Arm Extension 5/5   - RUE -  5/5, Arm Flexion 5/5, Arm Extension 5/5   - LLE - Plantarflex 5/5, Dorsiflex 5/5, Leg Raise 5/5, Hip Flex 5/5   - RLE - Plantarflex 5/5, Dorsiflex 5/5, Leg Raise 5/5, Hip Flex 5/5  Hunched over posture  no gait abnormalities   Skin: Skin is warm and dry. Capillary refill takes less than 2 seconds. No rash noted. No erythema. No pallor.   Psychiatric: He has a normal mood and affect. His behavior is normal. Judgment and thought content normal.         ED Course   Procedures  Labs Reviewed - No data to display       Imaging Results    None       HO-4 MDM  63 year old male with DM, HCV, and anxiety who was admitted to the hospital yesterday for seizure-like activity presenting to the ED with altered mental status    DDx: post-ictal state, intoxication, malingering, benzodiazepine withdrawal    A/P: After a long discussion with the patient, he agrees to be readmitted to complete the work-up of his seizure-like activity.  He is currently hemodynamically stable without evidence of acute worsening of his presenting condition.  I contacted the internal medicine department, he will be readmitted for further work-up.  Deric Angel MD, PGY-4  [facility] Emergency Medicine/Pediatrics Resident  3:10 PM 06/16/2019                      Attending Attestation:   Physician Attestation Statement for Resident:  As the supervising MD   Physician Attestation Statement: I have personally seen and examined this patient.   I agree with the above history. -: 63M with PMH DM, HCV, anxiety presenting to ED after leaving AMA earlier today for seizure-like activity, after  he was admitted for possible seizure. Per review of records pt has had multiple episodes of seizure vs PNES. Pt left AMA today because he was not given xanax as requested. Pt denying any pain or distress, only requesting xanax.    As the supervising MD I agree with the above PE.   -: NAD, poorly groomed, NCAT, A&Ox3, PERRL and EOMI, neck supple/normal ROM, CTAB, RRR no mrg, normal extremity ROM/m/s, no tremors, no focal neuro deficits, abd s/nt/nd, skin dry and warm.    As the supervising MD I agree with the above treatment, course, plan, and disposition.   -: Re-admission for prior seizure workup  I have reviewed the following: old records at this facility.                       Clinical Impression:       ICD-10-CM ICD-9-CM   1. Seizure R56.9 780.39                                Deric Angel MD  Resident  06/08/19 6236       Yoko Nunez MD  06/16/19 2030

## 2019-06-08 NOTE — ED TRIAGE NOTES
"Pt presents after signing out AMA from 7th floor earlier today because he was ready to go home. Pt left to try and find ride home when found in ED waiting room confused/altered and supposedly seizing. Pt reports "I was ready to go home, just waiting on the dr to give me my ativan but she never came."  "

## 2019-06-09 NOTE — NURSING
Unable to do assessment on pt do to pt behavior. Pt requesting to leave AMA. Charge nurse at bedside, risks explained, paperwork signed. Med team G notified. IV discontinued. Pt walked out.

## 2019-06-09 NOTE — NURSING
"Responding to call from sitters in adjacent rooms pt ambulating [address] fully clothed asking sitter and housekeeping staff "take this IV out, I got a call to my son he is on his way to get me".  Pt fully clothed, had removed himself from IV pump, removed tele box and was ambulating room with steady gait. Call placed to MED team and discussed pts desire to leave AMA. Risks reviewed with pt and IV removed. AMA paperwork and releases signed by patient with nurse Hogan as a wittiness. Pt ambulated off the unit.    "

## 2019-06-09 NOTE — NURSING
Responding to call for help from security and nursing staff pt found to be laying half out of the bed gripping the rails of bed and shaking arms and legs. Attempting to return patient to bed Mr. Richter grabbed nurse Cornelius LEWIS by the shirt and yanked him into bed. Nurse was freed by 2 staff members. Pt lifted into bed and continued to kick legs and shake arms at staff. Arms extending straight out and falling to side when lifted in front of face. Pt blinking and diverting gaze to new staff members entering the room then clenching eyes shut. Pt had no period of incontinence or post ictal period. Security staff and pct had responded to bed alarm sounding and had reported him standing and shaking extremities before landing into the bed with upper portion of body. Call placed to med Team G. Orders received for IV haldol 5 mg and benadryl 50 mg. Administered and seizure pads put in place. Order obtained for a telesitter for direct visual observation and elopement risk. Pt clothing removed and gown placed on patient telemetry box replaced.

## 2019-06-09 NOTE — NURSING
"During medication administration, pt initially refused to take anti seizure medication. Pt stated "I feel like I am about to have a seizure." charge nurse at bedside to evaluate. Med team PABLO pagekurt. Pt non responsive for 30 seconds. Tremors noted while patient was holding right hand in the air. Pt returned to normal neuro status, AAOx4. VSS. Blood sugar 95. Convinced patient to take anti seizure medication. Will inform team and continue to monitor.   "

## 2019-06-09 NOTE — CARE UPDATE
Called by RN regarding seizure like activity. On my arrival to the room the patient turned his head to look at me and then closed his eyes. He was not responding to questions, and then turned away from me and clenched his eyes. Chart reviewed, concern for malingering, PNEE. Charge RN reports patient aggressive with RN just prior. Haldol and benadryl IV ordered to help calm patient. Ativan was not given.     Later in the evening, he eventually left AMA (for the second time today). Patient ambulated off the unit before I could speak with the patient. AMA paperwork signed by charge RN.    Discharge summary to be completed by primary team in AM.    William Campos PA-C  [facility]

## 2019-06-13 NOTE — HOSPITAL COURSE
6/8: Called by RN regarding seizure like activity. On my arrival to the room the patient turned his head to look at me and then closed his eyes. He was not responding to questions, and then turned away from me and clenched his eyes. Chart reviewed, concern for malingering, PNEE. Charge RN reports patient aggressive with RN just prior. Haldol and benadryl IV ordered to help calm patient. Ativan was not given.      Later in the evening, he eventually left AMA (for the second time today). Patient ambulated off the unit before I could speak with the patient. AMA paperwork signed by charge RN.

## 2019-06-13 NOTE — DISCHARGE SUMMARY
[facility] Hospital Medicine  Discharge Summary      Patient Name: Josiah Richter  MRN: [id number]  Admission Date: 6/8/2019  Hospital Length of Stay: 1 days  Discharge Date and Time: 6/8/2019 11:39 PM  Attending Physician: No att. providers found   Discharging Provider: Cristina Abdi MD  Primary Care Provider: Tierra Max MD  [facility] Team: [facility] MED  Cristina Abdi MD    HPI:   The patient is a 64 y/o male with PMH of T2DM, polysubstance abuse, and HCV who presents with seizures. Patient is a poor historian and no family at bedside. Thus, history taken mainly from chart review, nursing documention and ER physician report. He started shaking while in the ER lobby at around 12:55 PM then it recurred at 1 PM. Per ED reports, he was found to have keppra and xanax prescriptions in his possession with prescription date of 6/5. ER physician noted the following:     Patient with shaking of all his extremities, not urging of his back, no incontinence, no tongue biting.  At 1 point he was holding his right upper extremity up in the air and then his right upper extremity.  He was able to grab the railing and shake with the railing. Eyes were open not following commands however, he closed the close his eyes to protect his eyes from my hand. When he woke up he was not drowsy but confused to place and recent facts.  Patient had another episode in the emergency department similar to the initial shaking episode, shorter lasted, he was lying on the left side in bed with mild shaking of the upper extremities. He was able to open his eyes and answer questions when I arrived in the room.     At [facility] on May18 it was documented the patient was discharged with Xanax taper of 0.5 mg b.i.d. however patient reports that he is still on Xanax and he gets prescription for 60 or 30 days, he thinks the last dose of Xanax was 3 days ago.     Patient was agitated and angry about not receiving Xanax. Per  "psychiatry, patient should not be receiving Xanax. There is no hemodynamic evidence of withdraws. Seizure like episodes suspicious of malingering. Patient refused EEG monitoring and took off leads in order to capture events. Patient decided to leave hospital AMA. Now he presents back to ED with shaking episodes after leaving AMA.      * No surgery found *      Hospital Course:   6/8: Called by RN regarding seizure like activity. On my arrival to the room the patient turned his head to look at me and then closed his eyes. He was not responding to questions, and then turned away from me and clenched his eyes. Chart reviewed, concern for malingering, PNEE. Charge RN reports patient aggressive with RN just prior. Haldol and benadryl IV ordered to help calm patient. Ativan was not given.      Later in the evening, he eventually left AMA (for the second time today). Patient ambulated off the unit before I could speak with the patient. AMA paperwork signed by charge RN.     Consults:     * Psychogenic nonepileptic seizure  History suggestive of pseudoseizures   Unlikely benzo withdrawal seizures  CT head: No acute infarct or hemorrhage or mass or fracture.  UA positive for trace leukocytes  UDS positive for benzodiazepines  ammonia within normal limits  CBC with normal white count, hemoglobin and platelets  CMP with slight elevation of Sr Cr, bicarbonate 17. Improved.   ETOH is negative  CPK , TSH WNL  Prolactin slightly low   Given keppra 1g IV in the ER  Continue keppra 500mg BID   Seizure precautions   Neuro ICU consult - Patient stable for floor per staff  Neurology consult  1. EEG - official read pending. But per neurology note "No electrographic evidence of seizures"  2. No further recommendations at this time as patient w/o electrographic seizures  3. Cont Ativan PRN for seizures 2-3 minutes or for recurrent seizures without return to cognitive baseline    Polysubstance abuse  Per Psychiatry:  Per discussion with " patient's psychiatrist, Dr. Nahid Gardner, the patient has a habit of MD shopping and overusing Xanax, and should not be restarted on Xanax while in the hospital  Can use Vistaril 25-50mg PO Q6H PRN anxiety (not to exceed 100mg in 24-hour period)  Continue Ativan PRN seizures or unstable vitals    Chronic hepatitis C  LFT's mildly elevated  No signs of acute decompensation  No acute issues   Follow up as outpatient with hepatology      COPD (chronic obstructive pulmonary disease)  No active wheezes  Duo nebs and oxygen PRN      Type 2 diabetes mellitus, without long-term current use of insulin  Last A1C WNL back in Feb 2019; check A1C  Not on any insulin or oral medications   Low dose correction scale   Cont blood glucose monitoring   BG goal:  Preprandial blood glucose target <140 mg/dL  Random glucoses <180 mg/dL  Avoid hypoglycemia   ADA diet        Final Active Diagnoses:    Diagnosis Date Noted POA    PRINCIPAL PROBLEM:  Psychogenic nonepileptic seizure [F44.5] 06/07/2019 Yes    Polysubstance abuse [F19.10] 06/07/2019 Yes    Type 2 diabetes mellitus, without long-term current use of insulin [E11.9] 02/25/2019 Yes     Chronic    COPD (chronic obstructive pulmonary disease) [J44.9] 02/25/2019 Yes    Chronic hepatitis C [B18.2] 02/25/2019 Yes     Chronic      Problems Resolved During this Admission:       Discharged Condition: against medical advice    Disposition: Left Against Medical Adv*    Follow Up:    Patient Instructions:   No discharge procedures on file.    Significant Diagnostic Studies: Labs: BMP: No results for input(s): GLU, NA, K, CL, CO2, BUN, CREATININE, CALCIUM, MG in the last 48 hours. and CBC No results for input(s): WBC, HGB, HCT, PLT in the last 48 hours.    Pending Diagnostic Studies:     None         Medications:  Reconciled Home Medications:      Medication List      ASK your doctor about these medications    albuterol 90 mcg/actuation inhaler  Commonly known as:  PROVENTIL/VENTOLIN  HFA  Inhale 1-2 puffs into the lungs every 6 (six) hours as needed for Wheezing. Rescue     benzonatate 100 MG capsule  Commonly known as:  TESSALON PERLES  Take 1 capsule (100 mg total) by mouth 3 (three) times daily as needed for Cough.     ibuprofen 800 MG tablet  Commonly known as:  ADVIL,MOTRIN  Take 1 tablet (800 mg total) by mouth every 6 (six) hours as needed for Pain.     ipratropium 42 mcg (0.06 %) nasal spray  Commonly known as:  ATROVENT  2 sprays by Nasal route 4 (four) times daily.     SUBOXONE 8-2 mg Film  Generic drug:  buprenorphine-naloxone  DISSOLVE 1 FILM UNT TID            Indwelling Lines/Drains at time of discharge:   Lines/Drains/Airways          None          Time spent on the discharge of patient: 30 minutes  Patient was seen and examined on the date of discharge and determined to be suitable for discharge.         Cristina Abdi MD  Department of Hospital Medicine  [facility]

## 2019-08-11 ENCOUNTER — HOSPITAL ENCOUNTER (EMERGENCY)
Facility: HOSPITAL | Age: 64
Discharge: HOME | End: 2019-08-11
Attending: EMERGENCY MEDICINE
Payer: MEDICARE

## 2019-08-11 VITALS
TEMPERATURE: 98 F | RESPIRATION RATE: 18 BRPM | HEIGHT: 67 IN | DIASTOLIC BLOOD PRESSURE: 92 MMHG | SYSTOLIC BLOOD PRESSURE: 148 MMHG | BODY MASS INDEX: 25.58 KG/M2 | WEIGHT: 163 LBS | OXYGEN SATURATION: 99 % | HEART RATE: 99 BPM

## 2019-08-11 DIAGNOSIS — Z76.0 MEDICATION REFILL: Primary | ICD-10-CM

## 2019-08-11 PROCEDURE — 93010 EKG 12-LEAD: ICD-10-PCS | Mod: ,,, | Performed by: INTERNAL MEDICINE

## 2019-08-11 PROCEDURE — 93005 ELECTROCARDIOGRAM TRACING: CPT

## 2019-08-11 PROCEDURE — 99283 EMERGENCY DEPT VISIT LOW MDM: CPT | Mod: ,,, | Performed by: PHYSICIAN ASSISTANT

## 2019-08-11 PROCEDURE — 99283 EMERGENCY DEPT VISIT LOW MDM: CPT

## 2019-08-11 PROCEDURE — 99283 PR EMERGENCY DEPT VISIT,LEVEL III: ICD-10-PCS | Mod: ,,, | Performed by: PHYSICIAN ASSISTANT

## 2019-08-11 PROCEDURE — 93010 ELECTROCARDIOGRAM REPORT: CPT | Mod: ,,, | Performed by: INTERNAL MEDICINE

## 2019-08-11 RX ORDER — CLOPIDOGREL BISULFATE 75 MG/1
75 TABLET ORAL DAILY
Qty: 14 TABLET | Refills: 0 | Status: SHIPPED | OUTPATIENT
Start: 2019-08-11 | End: 2020-08-10
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
## 2019-08-11 NOTE — ED NOTES
LOC: The patient is awake, alert, and oriented to place, time, situation. Affect is appropriate.  Speech is difficult to understand.    APPEARANCE: Patient resting comfortably in no acute distress.      SKIN: The skin is warm and dry; color consistent with ethnicity.  Patient has normal skin turgor and moist mucus membranes.  Skin intact; no breakdown or bruising noted.     MUSCULOSKELETAL: Patient moving upper and lower extremities without difficulty.  Denies weakness.     RESPIRATORY: Airway is open and patent. Respirations spontaneous.  Patient has a normal effort and rate.  No accessory muscle use noted.    CARDIAC:    No peripheral edema noted. No complaints of chest pain.      ABDOMEN: Soft and non tender to palpation.  No distention noted.     NEUROLOGIC: Eyes open spontaneously.  Behavior appropriate to situation.  Follows commands; facial expression symmetrical.  Purposeful motor response noted; normal sensation in all extremities.

## 2019-09-11 ENCOUNTER — HOSPITAL ENCOUNTER (EMERGENCY)
Facility: OTHER | Age: 64
Discharge: HOME | End: 2019-09-11
Attending: EMERGENCY MEDICINE
Payer: MEDICARE

## 2019-09-11 VITALS
TEMPERATURE: 98 F | WEIGHT: 155 LBS | SYSTOLIC BLOOD PRESSURE: 148 MMHG | DIASTOLIC BLOOD PRESSURE: 69 MMHG | OXYGEN SATURATION: 97 % | BODY MASS INDEX: 24.33 KG/M2 | RESPIRATION RATE: 21 BRPM | HEART RATE: 76 BPM | HEIGHT: 67 IN

## 2019-09-11 DIAGNOSIS — R56.9 SEIZURE: ICD-10-CM

## 2019-09-11 DIAGNOSIS — R07.9 CHEST PAIN: ICD-10-CM

## 2019-09-11 DIAGNOSIS — R07.89 OTHER CHEST PAIN: Primary | ICD-10-CM

## 2019-09-11 LAB
ALBUMIN SERPL BCP-MCNC: 3.5 G/DL (ref 3.5–5.2)
ALP SERPL-CCNC: 102 U/L (ref 55–135)
ALT SERPL W/O P-5'-P-CCNC: 78 U/L (ref 10–44)
ANION GAP SERPL CALC-SCNC: 14 MMOL/L (ref 8–16)
AST SERPL-CCNC: 64 U/L (ref 10–40)
BASOPHILS # BLD AUTO: 0.04 K/UL (ref 0–0.2)
BASOPHILS NFR BLD: 0.4 % (ref 0–1.9)
BILIRUB SERPL-MCNC: 0.4 MG/DL (ref 0.1–1)
BNP SERPL-MCNC: <10 PG/ML (ref 0–99)
BUN SERPL-MCNC: 21 MG/DL (ref 8–23)
CALCIUM SERPL-MCNC: 9.3 MG/DL (ref 8.7–10.5)
CHLORIDE SERPL-SCNC: 109 MMOL/L (ref 95–110)
CO2 SERPL-SCNC: 16 MMOL/L (ref 23–29)
CREAT SERPL-MCNC: 1.3 MG/DL (ref 0.5–1.4)
DIFFERENTIAL METHOD: ABNORMAL
EOSINOPHIL # BLD AUTO: 0.2 K/UL (ref 0–0.5)
EOSINOPHIL NFR BLD: 2 % (ref 0–8)
ERYTHROCYTE [DISTWIDTH] IN BLOOD BY AUTOMATED COUNT: 13.8 % (ref 11.5–14.5)
EST. GFR  (AFRICAN AMERICAN): >60 ML/MIN/1.73 M^2
EST. GFR  (NON AFRICAN AMERICAN): 58 ML/MIN/1.73 M^2
GLUCOSE SERPL-MCNC: 159 MG/DL (ref 70–110)
HCT VFR BLD AUTO: 46.3 % (ref 40–54)
HGB BLD-MCNC: 15.4 G/DL (ref 14–18)
IMM GRANULOCYTES # BLD AUTO: 0.03 K/UL (ref 0–0.04)
IMM GRANULOCYTES NFR BLD AUTO: 0.3 % (ref 0–0.5)
LYMPHOCYTES # BLD AUTO: 2.8 K/UL (ref 1–4.8)
LYMPHOCYTES NFR BLD: 28.9 % (ref 18–48)
MAGNESIUM SERPL-MCNC: 2.2 MG/DL (ref 1.6–2.6)
MCH RBC QN AUTO: 32 PG (ref 27–31)
MCHC RBC AUTO-ENTMCNC: 33.3 G/DL (ref 32–36)
MCV RBC AUTO: 96 FL (ref 82–98)
MONOCYTES # BLD AUTO: 0.6 K/UL (ref 0.3–1)
MONOCYTES NFR BLD: 6.4 % (ref 4–15)
NEUTROPHILS # BLD AUTO: 6 K/UL (ref 1.8–7.7)
NEUTROPHILS NFR BLD: 62 % (ref 38–73)
NRBC BLD-RTO: 0 /100 WBC
PLATELET # BLD AUTO: 151 K/UL (ref 150–350)
PMV BLD AUTO: 10.3 FL (ref 9.2–12.9)
POCT GLUCOSE: 158 MG/DL (ref 70–110)
POTASSIUM SERPL-SCNC: 4.5 MMOL/L (ref 3.5–5.1)
PROT SERPL-MCNC: 8.6 G/DL (ref 6–8.4)
RBC # BLD AUTO: 4.81 M/UL (ref 4.6–6.2)
SODIUM SERPL-SCNC: 139 MMOL/L (ref 136–145)
TROPONIN I SERPL DL<=0.01 NG/ML-MCNC: <0.006 NG/ML (ref 0–0.03)
WBC # BLD AUTO: 9.63 K/UL (ref 3.9–12.7)

## 2019-09-11 PROCEDURE — 84484 ASSAY OF TROPONIN QUANT: CPT

## 2019-09-11 PROCEDURE — 83880 ASSAY OF NATRIURETIC PEPTIDE: CPT

## 2019-09-11 PROCEDURE — 83735 ASSAY OF MAGNESIUM: CPT

## 2019-09-11 PROCEDURE — 25000003 PHARM REV CODE 250: Performed by: EMERGENCY MEDICINE

## 2019-09-11 PROCEDURE — 96374 THER/PROPH/DIAG INJ IV PUSH: CPT

## 2019-09-11 PROCEDURE — 63600175 PHARM REV CODE 636 W HCPCS

## 2019-09-11 PROCEDURE — 80053 COMPREHEN METABOLIC PANEL: CPT

## 2019-09-11 PROCEDURE — 85025 COMPLETE CBC W/AUTO DIFF WBC: CPT

## 2019-09-11 PROCEDURE — 93010 EKG 12-LEAD: ICD-10-PCS | Mod: ,,, | Performed by: INTERNAL MEDICINE

## 2019-09-11 PROCEDURE — 36415 COLL VENOUS BLD VENIPUNCTURE: CPT

## 2019-09-11 PROCEDURE — 82962 GLUCOSE BLOOD TEST: CPT

## 2019-09-11 PROCEDURE — 99284 EMERGENCY DEPT VISIT MOD MDM: CPT | Mod: 25

## 2019-09-11 PROCEDURE — 93005 ELECTROCARDIOGRAM TRACING: CPT

## 2019-09-11 PROCEDURE — 96361 HYDRATE IV INFUSION ADD-ON: CPT

## 2019-09-11 PROCEDURE — 63600175 PHARM REV CODE 636 W HCPCS: Performed by: EMERGENCY MEDICINE

## 2019-09-11 PROCEDURE — 93010 ELECTROCARDIOGRAM REPORT: CPT | Mod: 76,,, | Performed by: INTERNAL MEDICINE

## 2019-09-11 PROCEDURE — 96376 TX/PRO/DX INJ SAME DRUG ADON: CPT

## 2019-09-11 RX ORDER — LEVETIRACETAM 500 MG/1
500 TABLET ORAL
Status: COMPLETED | OUTPATIENT
Start: 2019-09-11 | End: 2019-09-11

## 2019-09-11 RX ORDER — ALPRAZOLAM 1 MG/1
1 TABLET ORAL
COMMUNITY
Start: 2019-08-27 | End: 2019-09-14

## 2019-09-11 RX ORDER — ASPIRIN 325 MG
325 TABLET ORAL
Status: COMPLETED | OUTPATIENT
Start: 2019-09-11 | End: 2019-09-11

## 2019-09-11 RX ORDER — LORAZEPAM 2 MG/ML
INJECTION INTRAMUSCULAR
Status: COMPLETED
Start: 2019-09-11 | End: 2019-09-11

## 2019-09-11 RX ORDER — IBUPROFEN 400 MG/1
800 TABLET ORAL
Status: COMPLETED | OUTPATIENT
Start: 2019-09-11 | End: 2019-09-11

## 2019-09-11 RX ORDER — IBUPROFEN 800 MG/1
800 TABLET ORAL EVERY 6 HOURS PRN
Qty: 20 TABLET | Refills: 0 | Status: SHIPPED | OUTPATIENT
Start: 2019-09-11

## 2019-09-11 RX ORDER — LEVETIRACETAM 500 MG/1
TABLET ORAL
COMMUNITY
Start: 2019-06-05 | End: 2020-08-28 | Stop reason: SDUPTHER

## 2019-09-11 RX ORDER — LORAZEPAM 2 MG/ML
INJECTION INTRAMUSCULAR
Status: DISCONTINUED
Start: 2019-09-11 | End: 2019-09-11 | Stop reason: WASHOUT

## 2019-09-11 RX ADMIN — LORAZEPAM 2 MG: 2 INJECTION INTRAMUSCULAR; INTRAVENOUS at 01:09

## 2019-09-11 RX ADMIN — LORAZEPAM: 2 INJECTION INTRAMUSCULAR; INTRAVENOUS at 01:09

## 2019-09-11 RX ADMIN — ASPIRIN 325 MG ORAL TABLET 325 MG: 325 PILL ORAL at 02:09

## 2019-09-11 RX ADMIN — LORAZEPAM 2 MG: 2 INJECTION INTRAMUSCULAR; INTRAVENOUS at 03:09

## 2019-09-11 RX ADMIN — SODIUM CHLORIDE 1000 ML: 0.9 INJECTION, SOLUTION INTRAVENOUS at 02:09

## 2019-09-11 RX ADMIN — LEVETIRACETAM 500 MG: 500 TABLET, FILM COATED ORAL at 05:09

## 2019-09-11 RX ADMIN — IBUPROFEN 800 MG: 400 TABLET, FILM COATED ORAL at 05:09

## 2019-09-11 NOTE — ED NOTES
"RN asked, "What happened about 5 minutes ago?"  Pt states, "I had a seizure and chest pain."  Answering questions appropriately at this time.  aao x 4.  "

## 2019-09-11 NOTE — ED NOTES
"Pt able to be redirected after ativan administration.  States, "My chest.  My chest hurts."  MD informed.  Pt does not appear to be postictal.  No urinary or bowel incontinence.  Following directions at this time.  Respirations even and unlabored at this time.  "

## 2019-09-11 NOTE — ED NOTES
Pt having seizure-like activity in waiting room.  Pt was lifted onto stretcher in the waiting room.  As ER team moved pt to ED, pt was grabbing side rail and shaking whole body.  No secretions noted to mouth.

## 2019-09-11 NOTE — ED NOTES
MD at bedside to assess pt.  Pt ambulated with steady gait, no assistance needed.  O2 sats 98% on RA.  HR 84, RR 18.

## 2019-09-11 NOTE — ED PROVIDER NOTES
"Takes tepro Encounter Date: 9/11/2019    SCRIBE #1 NOTE: I, Yasmine Garcia, am scribing for, and in the presence of, Dr. Adams .       History     Chief Complaint   Patient presents with    Chest Pain     chest pain x 2 weeks. reports pain worse today and that he is SOB     Time seen by provider: 3:08 PM    This is a 64 y.o. male with a hx of seizures who presents with complaint of chest pressure which feel like a elephant is sitting on his chest and associated sob. The patient reports that he has been experiencing chest discomfort for 4 months but it has worsened over the past week.  He was seen at [facility] for similar symptoms and they decided to place a heart monitor. They later found that he had a "hole in his heart" so he is scheduled for an appointment to get it corrected. He came here for pain management. He reports not having an appetite and having problems keeping his balance. He denies using any drugs or alcohol. He takes Keppra for his seizures but he admits to noncompliance.  Denies any recent illness.  Denies any urinary symptoms. Denies any nausea/vomiting/diarrhea.      The history is provided by the patient.     Review of patient's allergies indicates:   Allergen Reactions    Mayonnaise Anaphylaxis     Past Medical History:   Diagnosis Date    Anxiety     Diabetes mellitus 2017    Diabetes mellitus, type 2     HCV (hepatitis C virus)     Heart damage     "hole in heart"    Kidney stone     s/p L ureteral stent placement on 5/29/2018. Infection cleared and he subsequently had a stent removal    Urinary tract infection      Past Surgical History:   Procedure Laterality Date    KIDNEY STONE SURGERY      NECK SURGERY      secondary to previous fall     Family History   Problem Relation Age of Onset    Hypertension Mother     Diabetes Mother     Stroke Mother     Coronary artery disease Father         CABG    Prostate cancer Neg Hx     Kidney disease Neg Hx     Colon cancer Neg Hx  " "    Social History     Tobacco Use    Smoking status: Former Smoker     Packs/day: 1.50     Years: 40.00     Pack years: 60.00     Types: Cigarettes     Start date:      Last attempt to quit: 2019     Years since quittin.5    Smokeless tobacco: Never Used    Tobacco comment: pt states he "quit 2 weeks ago"   Substance Use Topics    Alcohol use: No    Drug use: Not Currently     Types: Oxycodone     Review of Systems   Constitutional: Positive for appetite change. Negative for chills and fever.   HENT: Negative for congestion, rhinorrhea and sore throat.    Eyes: Negative for visual disturbance.   Respiratory: Positive for chest tightness and shortness of breath. Negative for cough.    Cardiovascular: Positive for chest pain.   Gastrointestinal: Negative for abdominal pain, diarrhea, nausea and vomiting.   Genitourinary: Negative for dysuria.   Musculoskeletal: Negative for back pain.   Skin: Negative for rash.   Neurological: Negative for dizziness, weakness and light-headedness.        Positive for lack of balance.    Psychiatric/Behavioral: Negative for confusion.       Physical Exam     Initial Vitals [19 1331]   BP Pulse Resp Temp SpO2   109/71 108 18 97.8 °F (36.6 °C) 95 %      MAP       --         Physical Exam    Nursing note and vitals reviewed.  Constitutional: He appears well-developed and well-nourished. He is not diaphoretic. No distress.   HENT:   Head: Normocephalic and atraumatic.   Right Ear: External ear normal.   Left Ear: External ear normal.   Eyes: Conjunctivae are normal. Right eye exhibits no discharge. Left eye exhibits no discharge.   Neck: Normal range of motion. Neck supple.   Cardiovascular: Regular rhythm, normal heart sounds and intact distal pulses.   Tachycardic.  2+ DP/PT pulses bilaterally.   Pulmonary/Chest: Breath sounds normal. No respiratory distress. He has no wheezes. He has no rhonchi. He has no rales. He exhibits no tenderness.   Abdominal: Soft. " Bowel sounds are normal. He exhibits no distension. There is no tenderness. There is no rebound and no guarding.   Musculoskeletal: Normal range of motion. He exhibits no edema or tenderness.   Lymphadenopathy:     He has no cervical adenopathy.   Neurological: He is alert and oriented to person, place, and time. He has normal strength. No sensory deficit.   Skin: Skin is warm and dry. No rash noted. No erythema.   Psychiatric: He has a normal mood and affect. His behavior is normal. Judgment and thought content normal.         ED Course   Procedures  Labs Reviewed   COMPREHENSIVE METABOLIC PANEL - Abnormal; Notable for the following components:       Result Value    CO2 16 (*)     Glucose 159 (*)     Total Protein 8.6 (*)     AST 64 (*)     ALT 78 (*)     eGFR if non  58 (*)     All other components within normal limits   CBC W/ AUTO DIFFERENTIAL - Abnormal; Notable for the following components:    Mean Corpuscular Hemoglobin 32.0 (*)     All other components within normal limits   POCT GLUCOSE - Abnormal; Notable for the following components:    POCT Glucose 158 (*)     All other components within normal limits   MAGNESIUM   TROPONIN I   B-TYPE NATRIURETIC PEPTIDE   POCT GLUCOSE MONITORING CONTINUOUS     EKG Readings: (Independently Interpreted)   1:31PM:  Rate of 101.  Sinus tach.  Normal axis.  Normal intervals.  No ST or ischemic changes.       Imaging Results          X-Ray Chest AP Portable (Final result)  Result time 09/11/19 14:42:08    Final result by Dexter Ott MD (09/11/19 14:42:08)                 Impression:      No acute abnormality.      Electronically signed by: Dexter Ott MD  Date:    09/11/2019  Time:    14:42             Narrative:    EXAMINATION:  XR CHEST AP PORTABLE    CLINICAL HISTORY:  . Chest pain, unspecified    TECHNIQUE:  Single frontal portable view of the chest was performed.    COMPARISON:  02/25/2019    FINDINGS:  Previously identified right pleural  effusion has apparently resolved.The lungs are clear, with normal appearance of pulmonary vasculature and no pleural effusion or pneumothorax.    The cardiac silhouette is normal in size. The hilar and mediastinal contours are unremarkable.    Bones are intact.                                 Medical Decision Making:   History:   Old Medical Records: I decided to obtain old medical records.  Old Records Summarized: other records and records from previous admission(s).  Initial Assessment:   3:08PM:  Patient is a 64-year-old male who presents to the emergency department with chest pain.  Patient has been having chest pain for 4 months now, currently getting worked up at [facility].  Upon initial arrival in the waiting room, patient had seizure-like activity.  However patient was responding to pain and withdrawing to pain while placing an IV, while he was seizing.  Patient does have a documented history of pseudoseizures.  He will need a cardiac evaluation.  Will continue to follow.  Independently Interpreted Test(s):   I have ordered and independently interpreted X-rays - see prior notes.  I have ordered and independently interpreted EKG Reading(s) - see prior notes  Clinical Tests:   Lab Tests: Ordered and Reviewed  Radiological Study: Ordered and Reviewed  Medical Tests: Ordered and Reviewed    4:54PM:  Patient doing well, he is feeling better.  He is requesting to go home.  His labs and EKG are all unremarkable.  His troponin is negative as well.  Given that his pain has been persistent for months now, I have a low suspicion that this pain is cardiac in nature.  He was able to ambulate with a steady gait with no issues.  I did give him a dose of ibuprofen for his pain along with a dose of Keppra.  He does understand the need to follow up with his cardiologist as planned.  I do not feel that further workup is indicated at this time.  I updated the patient regarding results and I counseled the patient regarding supportive  care measures.            Scribe Attestation:   Scribe #1: I performed the above scribed service and the documentation accurately describes the services I performed. I attest to the accuracy of the note.    Attending Attestation:           Physician Attestation for Scribe:  Physician Attestation Statement for Scribe #1: I, Dr. Adams, reviewed documentation, as scribed by Yasmine Garcia in my presence, and it is both accurate and complete.                    Clinical Impression:     1. Other chest pain    2. Chest pain    3. Seizure                                 Micki Adams MD  09/11/19 0608

## 2019-09-11 NOTE — ED TRIAGE NOTES
"Pt presents to ED with c/o chest pain x2-3 weeks. Pt states he "feels like an elephant is siting on his chest." Pt reports SOB. Pt states he is "seeing a doctor here and will have surgery this month to fix the hole in his heart." Pt also states that he had a seizure in the waiting room. Pt non compliant with medications. Reports hx of seizures (last one about 2 months ago), but states he hasn't taken his seizure medications in over a month b/c he "doesn't like the way it makes him feel and he doesn't have seizures often." Pt reports current SOB and is on 2L O2 nasal cannula.   "

## 2019-09-11 NOTE — ED NOTES
Pt having seizure-like activity, tremors noted to arms.  Pt was turned onto his side.  Pt holding side of stretcher and clutching cell phone.  Not answering questions.  MD at bedside to assess, requesting 2 mg Ativan IV.

## 2019-09-11 NOTE — ED NOTES
"Pt states, "I think I'm going into another seizure.  I can't stop shaking."  RN states, "Can you turn on your side for me?"  Pt states, "Okay," and turned onto his L side.  Pt states, "I feel like I can't take a deep breath."  RN offered oxygen via nasal canula, pt lifted head to assist RN in placing canula.  Pt awake and alert, answering questions, pt holding stretcher side rails and shaking.  "

## 2019-09-13 ENCOUNTER — TELEPHONE (OUTPATIENT)
Dept: PAIN MEDICINE | Facility: CLINIC | Age: 64
End: 2019-09-13

## 2019-09-13 NOTE — TELEPHONE ENCOUNTER
"Contacted patient regarding his appointment on Monday with Dr. Corona. The reason listed is Heart Monitor, we don't treat this issue. There was no answer left message.     Spoke to his daughter on mobile line and she stated " I thought that was something strange, can you cancel that appointment and I will get in touch him and let him know you called."     appt cancelled.   "

## 2019-10-14 ENCOUNTER — EDUCATION (OUTPATIENT)
Dept: CARDIOLOGY | Facility: CLINIC | Age: 64
End: 2019-10-14

## 2019-10-14 ENCOUNTER — LAB VISIT (OUTPATIENT)
Dept: LAB | Facility: HOSPITAL | Age: 64
End: 2019-10-14
Payer: MEDICARE

## 2019-10-14 ENCOUNTER — INITIAL CONSULT (OUTPATIENT)
Dept: CARDIOLOGY | Facility: CLINIC | Age: 64
End: 2019-10-14
Payer: MEDICARE

## 2019-10-14 VITALS
OXYGEN SATURATION: 96 % | SYSTOLIC BLOOD PRESSURE: 138 MMHG | HEART RATE: 71 BPM | HEIGHT: 67 IN | DIASTOLIC BLOOD PRESSURE: 79 MMHG | WEIGHT: 160.5 LBS | BODY MASS INDEX: 25.19 KG/M2

## 2019-10-14 DIAGNOSIS — Q21.12 PFO (PATENT FORAMEN OVALE): ICD-10-CM

## 2019-10-14 DIAGNOSIS — Q21.12 PFO (PATENT FORAMEN OVALE): Primary | ICD-10-CM

## 2019-10-14 DIAGNOSIS — I63.9 CEREBROVASCULAR ACCIDENT (CVA), UNSPECIFIED MECHANISM: ICD-10-CM

## 2019-10-14 DIAGNOSIS — R07.9 CHEST PAIN, UNSPECIFIED TYPE: ICD-10-CM

## 2019-10-14 DIAGNOSIS — G45.9 TIA (TRANSIENT ISCHEMIC ATTACK): ICD-10-CM

## 2019-10-14 LAB
ANION GAP SERPL CALC-SCNC: 7 MMOL/L (ref 8–16)
BUN SERPL-MCNC: 17 MG/DL (ref 8–23)
CALCIUM SERPL-MCNC: 9.2 MG/DL (ref 8.7–10.5)
CHLORIDE SERPL-SCNC: 110 MMOL/L (ref 95–110)
CO2 SERPL-SCNC: 25 MMOL/L (ref 23–29)
CREAT SERPL-MCNC: 1 MG/DL (ref 0.5–1.4)
ERYTHROCYTE [DISTWIDTH] IN BLOOD BY AUTOMATED COUNT: 14.1 % (ref 11.5–14.5)
EST. GFR  (AFRICAN AMERICAN): >60 ML/MIN/1.73 M^2
EST. GFR  (NON AFRICAN AMERICAN): >60 ML/MIN/1.73 M^2
GLUCOSE SERPL-MCNC: 80 MG/DL (ref 70–110)
HCT VFR BLD AUTO: 48.9 % (ref 40–54)
HGB BLD-MCNC: 15.6 G/DL (ref 14–18)
INR PPP: 1.1 (ref 0.8–1.2)
MCH RBC QN AUTO: 32.4 PG (ref 27–31)
MCHC RBC AUTO-ENTMCNC: 31.9 G/DL (ref 32–36)
MCV RBC AUTO: 102 FL (ref 82–98)
PLATELET # BLD AUTO: 109 K/UL (ref 150–350)
PMV BLD AUTO: 10.3 FL (ref 9.2–12.9)
POTASSIUM SERPL-SCNC: 3.9 MMOL/L (ref 3.5–5.1)
PROTHROMBIN TIME: 11.6 SEC (ref 9–12.5)
RBC # BLD AUTO: 4.82 M/UL (ref 4.6–6.2)
SODIUM SERPL-SCNC: 142 MMOL/L (ref 136–145)
WBC # BLD AUTO: 5.84 K/UL (ref 3.9–12.7)

## 2019-10-14 PROCEDURE — 99204 PR OFFICE/OUTPT VISIT, NEW, LEVL IV, 45-59 MIN: ICD-10-PCS | Mod: S$PBB,,, | Performed by: INTERNAL MEDICINE

## 2019-10-14 PROCEDURE — 36415 COLL VENOUS BLD VENIPUNCTURE: CPT

## 2019-10-14 PROCEDURE — 85610 PROTHROMBIN TIME: CPT

## 2019-10-14 PROCEDURE — 80048 BASIC METABOLIC PNL TOTAL CA: CPT

## 2019-10-14 PROCEDURE — 99999 PR PBB SHADOW E&M-EST. PATIENT-LVL IV: CPT | Mod: PBBFAC,,, | Performed by: INTERNAL MEDICINE

## 2019-10-14 PROCEDURE — 99204 OFFICE O/P NEW MOD 45 MIN: CPT | Mod: S$PBB,,, | Performed by: INTERNAL MEDICINE

## 2019-10-14 PROCEDURE — 99999 PR PBB SHADOW E&M-EST. PATIENT-LVL IV: ICD-10-PCS | Mod: PBBFAC,,, | Performed by: INTERNAL MEDICINE

## 2019-10-14 PROCEDURE — 99214 OFFICE O/P EST MOD 30 MIN: CPT | Mod: PBBFAC | Performed by: INTERNAL MEDICINE

## 2019-10-14 PROCEDURE — 85027 COMPLETE CBC AUTOMATED: CPT

## 2019-10-14 RX ORDER — GABAPENTIN 300 MG/1
CAPSULE ORAL
Refills: 0 | COMMUNITY
Start: 2019-10-09 | End: 2020-05-04 | Stop reason: SDUPTHER

## 2019-10-14 RX ORDER — ATORVASTATIN CALCIUM 20 MG/1
20 TABLET, FILM COATED ORAL
COMMUNITY
Start: 2019-06-20

## 2019-10-14 RX ORDER — DEXTROSE MONOHYDRATE AND SODIUM CHLORIDE 5; .45 G/100ML; G/100ML
INJECTION, SOLUTION INTRAVENOUS CONTINUOUS
Status: CANCELLED | OUTPATIENT
Start: 2019-10-14

## 2019-10-14 RX ORDER — DIPHENHYDRAMINE HCL 25 MG
50 CAPSULE ORAL ONCE
Status: CANCELLED | OUTPATIENT
Start: 2019-10-14 | End: 2019-10-14

## 2019-10-14 NOTE — PROGRESS NOTES
OUTPATIENT CATHETERIZATION INSTRUCTIONS    You have been scheduled for a procedure in the catheterization lab on Thursday, October 17, 2019.     Please report to the Cardiology Waiting Area on the Third floor of the hospital and check in at 11 AM.   You will then be taken to the SSCU (Short Stay Cardiac Unit) and prepared for your procedure. Please be aware that this is not the time of your procedure but the time you are to arrive. The procedures are scheduled on an hourly basis; however, emergency cases take precedence over all other cases.       You may not have anything to eat or drink after midnight the night before your test. You may take your regular morning medications with water. If there are any medications that you should not take you will be instructed to hold them that morning. If you are diabetic and on Metformin (Glucophage) do not take it the day before, the day of, and for 2 days after your procedure.      The procedure will take 1-2 hours to perform. After the procedure, you will return to SSCU on the third floor of the hospital. You will need to lie still (or keep your arm still) for the next 4 to 6 hours to minimize bleeding from the puncture site. Your family may remain in the room with you during this time.       You may be able to be discharged home that same afternoon if there is someone to drive you home and there were no complications. If you have one of the balloon, stent, or device procedures you may spend the night in the hospital. Your doctor will determine, based on your progress, the date and time of your discharge. The results of your procedure will be discussed with you before you are discharged. Any further testing or procedures will be scheduled for you either before you leave or you will be called with these appointments.       If you should have any questions, concerns, or need to change the date of your procedure, please call ELI Chávez @ [phone]    Special  Instructions:  Continue all medications        THE ABOVE INSTRUCTIONS WERE GIVEN TO THE PATIENT VERBALLY AND THEY VERBALIZED UNDERSTANDING.  THEY DO NOT REQUIRE ANY SPECIAL NEEDS AND DO NOT HAVE ANY LEARNING BARRIERS.          Directions for Reporting to Cardiology Waiting Area in the Hospital  If you park in the Parking Garage:  Take elevators to the1st floor of the parking garage.  Continue past the gift shop, coffee shop, and piano.  Take a right and go to the gold elevators. (Elevator B)  Take the elevator to the 3rd floor.  Follow the arrow on the sign on the wall that says Cath Lab Registration/EP Lab Registration.  Follow the long hallway all the way around until you come to a big open area.  This is the registration area.  Check in at Reception Desk.    OR    If family is dropping you off:  Have them drop you off at the front of the Hospital under the green overhang.  Enter through the doors and take a right.  Take the E elevators to the 3rd floor Cardiology Waiting Area.  Check in at the Reception Desk in the waiting room.

## 2019-10-14 NOTE — PROGRESS NOTES
PCP - Tierra Max MD   Referred by Dr. Yazan Galvan    Subjective:   Patient ID:  Josiah Richter is a 64 y.o. y.o. male who presents for  PFO closure evaluation.    - PMHx of cryptogenic TIA/CVA without residual deficits x2 on Plavix therapy, s/p Medtronic REVEAL recorder 08/27/19), NIDDM type II, Epilepsy, Hep C with chronic thrombocytopenia and chronic elevated transaminases, polysubstance abuse (Cocaine / THC) on Suboxone   - Family Hx of CVA x3 (mother), MI (Father @ 68)   - Social Hx of former smoker (1.5ppd x 20 years), Retired from construction    Presented to [facility] with dysrthria and focal weakness consistent with TIC, MRI was negative for acute CVA, seen by neurologist Dr. Tony though to be a TIA started on ASA / Statin. TTE with bubble positive for intra-atrial shunt. Then again presented on 06/20/19 with transient left sided weakness and slurred speech, consistent with TIA symptoms. Not compliant with Keppra for seizures, last seizure. Following this underwent YADI which demonstrated PFO and ILR placement. Patient doing well. No further recurrence of neurological symptoms. However having off / on left sided chest pain at rest and with exertional activity, discomfort could last for hours. Not relieved by anything. No prior coronary angiogram    Stress test: Negative for recurrent left sided chest pain  06/05/2019: CT head: stable, cerebellar rand cerebral atrophy without focal abnormality   06/10/19 LE doppler: Negative DVT LE  08/27/19 YADI: patent PFO, EF 60%,   07/10/19 TTE with bubble study: + intra-atrial shunt  06/20/19: Brain MRI no evidence of stroke  Carotid US: Minimal proximal internal carotid artery plqaue 0-49% stenosis   Loop 09/7/19: No arrhythmias    EKG: Sinus tachycardiac  BNP <10   Plt 120s  Cr 0.9 -> 1.3        History:     Social History     Tobacco Use    Smoking status: Former Smoker     Packs/day: 1.50     Years: 40.00     Pack years: 60.00     Types: Cigarettes     Start  "date:      Last attempt to quit: 2019     Years since quittin.6    Smokeless tobacco: Never Used    Tobacco comment: pt states he "quit 2 weeks ago"   Substance Use Topics    Alcohol use: No     Family History   Problem Relation Age of Onset    Hypertension Mother     Diabetes Mother     Stroke Mother     Coronary artery disease Father         CABG    Prostate cancer Neg Hx     Kidney disease Neg Hx     Colon cancer Neg Hx        Meds:     Review of patient's allergies indicates:   Allergen Reactions    Mayonnaise Anaphylaxis       Current Outpatient Medications:     albuterol (PROVENTIL/VENTOLIN HFA) 90 mcg/actuation inhaler, Inhale 1-2 puffs into the lungs every 6 (six) hours as needed for Wheezing. Rescue, Disp: 1 Inhaler, Rfl: 0    atorvastatin (LIPITOR) 20 MG tablet, Take 20 mg by mouth., Disp: , Rfl:     gabapentin (NEURONTIN) 300 MG capsule, TK 1 C PO TID PRN, Disp: , Rfl: 0    SUBOXONE 8-2 mg Film, DISSOLVE 1 FILM UNT TID, Disp: , Rfl: 0    benzonatate (TESSALON PERLES) 100 MG capsule, Take 1 capsule (100 mg total) by mouth 3 (three) times daily as needed for Cough. (Patient not taking: Reported on 10/14/2019), Disp: 30 capsule, Rfl: 1    clopidogrel (PLAVIX) 75 mg tablet, Take 1 tablet (75 mg total) by mouth once daily. (Patient not taking: Reported on 10/14/2019), Disp: 14 tablet, Rfl: 0    ibuprofen (ADVIL,MOTRIN) 800 MG tablet, Take 1 tablet (800 mg total) by mouth every 6 (six) hours as needed for Pain. (Patient not taking: Reported on 10/14/2019), Disp: 20 tablet, Rfl: 0    ipratropium (ATROVENT) 42 mcg (0.06 %) nasal spray, 2 sprays by Nasal route 4 (four) times daily. (Patient not taking: Reported on 10/14/2019), Disp: 15 mL, Rfl: 3    levETIRAcetam (KEPPRA) 500 MG Tab, Take by mouth., Disp: , Rfl:       Review of Systems   Constitutional: Negative for chills, fever, malaise/fatigue and weight loss.   Respiratory: Negative for cough, sputum production and shortness of " "breath.    Cardiovascular: Negative for chest pain, palpitations, orthopnea, claudication, leg swelling and PND.   Gastrointestinal: Negative for abdominal pain, diarrhea, nausea and vomiting.   Genitourinary: Negative for dysuria, hematuria and urgency.   Neurological: Positive for sensory change, focal weakness, seizures and weakness.       Objective:   /79 (BP Location: Right arm, Patient Position: Sitting, BP Method: Large (Automatic))   Pulse 71   Ht 5' 7" (1.702 m)   Wt 72.8 kg (160 lb 7.9 oz)   SpO2 96%   BMI 25.14 kg/m²   Physical Exam   Constitutional: He is oriented to person, place, and time. No distress.   HENT:   Head: Normocephalic and atraumatic.   Neck: Normal range of motion. Neck supple. No JVD present.   Cardiovascular: Normal rate, regular rhythm, normal heart sounds and intact distal pulses. Exam reveals no gallop and no friction rub.   No murmur heard.  Pulmonary/Chest: Effort normal and breath sounds normal. No respiratory distress. He has no wheezes. He has no rales.   Abdominal: Soft. Bowel sounds are normal. He exhibits no distension. There is no tenderness.   Musculoskeletal: Normal range of motion.   Neurological: He is alert and oriented to person, place, and time.   Skin: Skin is warm and dry. He is not diaphoretic.       Labs:     Lab Results   Component Value Date     09/11/2019    K 4.5 09/11/2019     09/11/2019    CO2 16 (L) 09/11/2019    BUN 21 09/11/2019    CREATININE 1.3 09/11/2019    ANIONGAP 14 09/11/2019     Lab Results   Component Value Date    HGBA1C 5.1 06/07/2019     Lab Results   Component Value Date    BNP <10 09/11/2019    BNP 34 02/03/2019       Lab Results   Component Value Date    WBC 9.63 09/11/2019    HGB 15.4 09/11/2019    HCT 46.3 09/11/2019     09/11/2019    GRAN 6.0 09/11/2019    GRAN 62.0 09/11/2019     Lab Results   Component Value Date    CHOL 119 (L) 03/13/2015    HDL 28 (L) 03/13/2015    LDLCALC 71.4 03/13/2015    TRIG 98 " 03/13/2015       Lab Results   Component Value Date     09/11/2019    K 4.5 09/11/2019     09/11/2019    CO2 16 (L) 09/11/2019    BUN 21 09/11/2019    CREATININE 1.3 09/11/2019    ANIONGAP 14 09/11/2019     Lab Results   Component Value Date    HGBA1C 5.1 06/07/2019     Lab Results   Component Value Date    BNP <10 09/11/2019    BNP 34 02/03/2019    Lab Results   Component Value Date    WBC 9.63 09/11/2019    HGB 15.4 09/11/2019    HCT 46.3 09/11/2019     09/11/2019    GRAN 6.0 09/11/2019    GRAN 62.0 09/11/2019     Lab Results   Component Value Date    CHOL 119 (L) 03/13/2015    HDL 28 (L) 03/13/2015    LDLCALC 71.4 03/13/2015    TRIG 98 03/13/2015          Cardiovascular Imaging:     Assessment & Plan:     Patient Active Problem List   Diagnosis    Type 2 diabetes mellitus, without long-term current use of insulin    COPD (chronic obstructive pulmonary disease)    Heavy smoker (more than 20 cigarettes per day)    Pneumonia    Chronic hepatitis C    Moderate malnutrition    Opiate dependence    Macrocytic anemia    Other insomnia    Chronic back pain    Psychogenic nonepileptic seizure    JOLEEN (acute kidney injury)    Transaminitis    Polysubstance abuse    Stroke    Chest pain        Josiah Richter is a 64 y.o. y.o. male who presents for  PFO closure evaluation for cryptogenic TIA/CVA without residual deficits x2 on Plavix therapy,    - S/p Medtronic REVEAL recorder 08/27/19, no arrhythmias as of 09/2019   - Family Hx of CVA x3 (mother), MI (Father @ 68)   - MRI / CT head / Carotids negative 06/20/19    - Need ILR recording upon scheduling to make sure no further arrhthymias identified  -- PFO closure with ICE, diagnostic LHC for chest pain  - Anti-platelet Therapy: Plavix only (thrombocytopenic)  - Access: R CFA / R CFV  - Catheters:  JR / JL, Super core, Pigtail, Transseptal puncture needle  - Creatinine/CrCl: 1.1  - Allergies: no contrast allergy  - Pre-Hydration: D5 1/2NS  125cc/hr  - Pre-Op Med: Benadryl  - All patient's questions were answered.  -The risks, benefits and alternatives of the procedure were explained to the patient.   -The risks of coronary angiography include but are not limited to: bleeding, infection, heart rhythm abnormalities, allergic reactions, kidney injury and potential need for dialysis, stroke and death.   - Should stenting be indicated, the patient has agreed to dual anti-platelet therapy for 1-consecutive year with a drug-eluting stent and a minimum of 1-month with the use of a bare metal stent  - Additionally, pt is aware that non-compliance is likely to result in stent clotting with heart attack, heart failure, and/or death  -The risks of moderate sedation include hypotension, respiratory depression, arrhythmias, bronchospasm, and death.   - Informed consent was obtained and the  patient is agreeable to proceed with the procedure.              Signed:  Juan Ramon Kulkarni M.D.  Page # [phone]  Cardiovascular Fellow PGY-IV  [facility]     Staff:  I have personally taken the history and examined this patient and agree with the fellow's note as stated above and amended it accordingly :-) He has had a YADI which isn't available at this moment, but I would like to review it or my notes on it as to whether there is also an ASA with the PFO and R to L shunt by bubble study.  Will review before the case.  His symptoms are unusual but have been labeled recurrent TIA's.  Since they occurred in the identical area of the brain both times, this would be unusual.

## 2019-10-14 NOTE — H&P (VIEW-ONLY)
PCP - Tierra Max MD   Referred by Dr. Yazan Galvan    Subjective:   Patient ID:  Josiah Richter is a 64 y.o. y.o. male who presents for  PFO closure evaluation.    - PMHx of cryptogenic TIA/CVA without residual deficits x2 on Plavix therapy, s/p Medtronic REVEAL recorder 08/27/19), NIDDM type II, Epilepsy, Hep C with chronic thrombocytopenia and chronic elevated transaminases, polysubstance abuse (Cocaine / THC) on Suboxone   - Family Hx of CVA x3 (mother), MI (Father @ 68)   - Social Hx of former smoker (1.5ppd x 20 years), Retired from construction    Presented to [facility] with dysrthria and focal weakness consistent with TIC, MRI was negative for acute CVA, seen by neurologist Dr. Tony though to be a TIA started on ASA / Statin. TTE with bubble positive for intra-atrial shunt. Then again presented on 06/20/19 with transient left sided weakness and slurred speech, consistent with TIA symptoms. Not compliant with Keppra for seizures, last seizure. Following this underwent YADI which demonstrated PFO and ILR placement. Patient doing well. No further recurrence of neurological symptoms. However having off / on left sided chest pain at rest and with exertional activity, discomfort could last for hours. Not relieved by anything. No prior coronary angiogram    Stress test: Negative for recurrent left sided chest pain  06/05/2019: CT head: stable, cerebellar rand cerebral atrophy without focal abnormality   06/10/19 LE doppler: Negative DVT LE  08/27/19 YADI: patent PFO, EF 60%,   07/10/19 TTE with bubble study: + intra-atrial shunt  06/20/19: Brain MRI no evidence of stroke  Carotid US: Minimal proximal internal carotid artery plqaue 0-49% stenosis   Loop 09/7/19: No arrhythmias    EKG: Sinus tachycardiac  BNP <10   Plt 120s  Cr 0.9 -> 1.3        History:     Social History     Tobacco Use    Smoking status: Former Smoker     Packs/day: 1.50     Years: 40.00     Pack years: 60.00     Types: Cigarettes     Start  "date:      Last attempt to quit: 2019     Years since quittin.6    Smokeless tobacco: Never Used    Tobacco comment: pt states he "quit 2 weeks ago"   Substance Use Topics    Alcohol use: No     Family History   Problem Relation Age of Onset    Hypertension Mother     Diabetes Mother     Stroke Mother     Coronary artery disease Father         CABG    Prostate cancer Neg Hx     Kidney disease Neg Hx     Colon cancer Neg Hx        Meds:     Review of patient's allergies indicates:   Allergen Reactions    Mayonnaise Anaphylaxis       Current Outpatient Medications:     albuterol (PROVENTIL/VENTOLIN HFA) 90 mcg/actuation inhaler, Inhale 1-2 puffs into the lungs every 6 (six) hours as needed for Wheezing. Rescue, Disp: 1 Inhaler, Rfl: 0    atorvastatin (LIPITOR) 20 MG tablet, Take 20 mg by mouth., Disp: , Rfl:     gabapentin (NEURONTIN) 300 MG capsule, TK 1 C PO TID PRN, Disp: , Rfl: 0    SUBOXONE 8-2 mg Film, DISSOLVE 1 FILM UNT TID, Disp: , Rfl: 0    benzonatate (TESSALON PERLES) 100 MG capsule, Take 1 capsule (100 mg total) by mouth 3 (three) times daily as needed for Cough. (Patient not taking: Reported on 10/14/2019), Disp: 30 capsule, Rfl: 1    clopidogrel (PLAVIX) 75 mg tablet, Take 1 tablet (75 mg total) by mouth once daily. (Patient not taking: Reported on 10/14/2019), Disp: 14 tablet, Rfl: 0    ibuprofen (ADVIL,MOTRIN) 800 MG tablet, Take 1 tablet (800 mg total) by mouth every 6 (six) hours as needed for Pain. (Patient not taking: Reported on 10/14/2019), Disp: 20 tablet, Rfl: 0    ipratropium (ATROVENT) 42 mcg (0.06 %) nasal spray, 2 sprays by Nasal route 4 (four) times daily. (Patient not taking: Reported on 10/14/2019), Disp: 15 mL, Rfl: 3    levETIRAcetam (KEPPRA) 500 MG Tab, Take by mouth., Disp: , Rfl:       Review of Systems   Constitutional: Negative for chills, fever, malaise/fatigue and weight loss.   Respiratory: Negative for cough, sputum production and shortness of " "breath.    Cardiovascular: Negative for chest pain, palpitations, orthopnea, claudication, leg swelling and PND.   Gastrointestinal: Negative for abdominal pain, diarrhea, nausea and vomiting.   Genitourinary: Negative for dysuria, hematuria and urgency.   Neurological: Positive for sensory change, focal weakness, seizures and weakness.       Objective:   /79 (BP Location: Right arm, Patient Position: Sitting, BP Method: Large (Automatic))   Pulse 71   Ht 5' 7" (1.702 m)   Wt 72.8 kg (160 lb 7.9 oz)   SpO2 96%   BMI 25.14 kg/m²   Physical Exam   Constitutional: He is oriented to person, place, and time. No distress.   HENT:   Head: Normocephalic and atraumatic.   Neck: Normal range of motion. Neck supple. No JVD present.   Cardiovascular: Normal rate, regular rhythm, normal heart sounds and intact distal pulses. Exam reveals no gallop and no friction rub.   No murmur heard.  Pulmonary/Chest: Effort normal and breath sounds normal. No respiratory distress. He has no wheezes. He has no rales.   Abdominal: Soft. Bowel sounds are normal. He exhibits no distension. There is no tenderness.   Musculoskeletal: Normal range of motion.   Neurological: He is alert and oriented to person, place, and time.   Skin: Skin is warm and dry. He is not diaphoretic.       Labs:     Lab Results   Component Value Date     09/11/2019    K 4.5 09/11/2019     09/11/2019    CO2 16 (L) 09/11/2019    BUN 21 09/11/2019    CREATININE 1.3 09/11/2019    ANIONGAP 14 09/11/2019     Lab Results   Component Value Date    HGBA1C 5.1 06/07/2019     Lab Results   Component Value Date    BNP <10 09/11/2019    BNP 34 02/03/2019       Lab Results   Component Value Date    WBC 9.63 09/11/2019    HGB 15.4 09/11/2019    HCT 46.3 09/11/2019     09/11/2019    GRAN 6.0 09/11/2019    GRAN 62.0 09/11/2019     Lab Results   Component Value Date    CHOL 119 (L) 03/13/2015    HDL 28 (L) 03/13/2015    LDLCALC 71.4 03/13/2015    TRIG 98 " 03/13/2015       Lab Results   Component Value Date     09/11/2019    K 4.5 09/11/2019     09/11/2019    CO2 16 (L) 09/11/2019    BUN 21 09/11/2019    CREATININE 1.3 09/11/2019    ANIONGAP 14 09/11/2019     Lab Results   Component Value Date    HGBA1C 5.1 06/07/2019     Lab Results   Component Value Date    BNP <10 09/11/2019    BNP 34 02/03/2019    Lab Results   Component Value Date    WBC 9.63 09/11/2019    HGB 15.4 09/11/2019    HCT 46.3 09/11/2019     09/11/2019    GRAN 6.0 09/11/2019    GRAN 62.0 09/11/2019     Lab Results   Component Value Date    CHOL 119 (L) 03/13/2015    HDL 28 (L) 03/13/2015    LDLCALC 71.4 03/13/2015    TRIG 98 03/13/2015          Cardiovascular Imaging:     Assessment & Plan:     Patient Active Problem List   Diagnosis    Type 2 diabetes mellitus, without long-term current use of insulin    COPD (chronic obstructive pulmonary disease)    Heavy smoker (more than 20 cigarettes per day)    Pneumonia    Chronic hepatitis C    Moderate malnutrition    Opiate dependence    Macrocytic anemia    Other insomnia    Chronic back pain    Psychogenic nonepileptic seizure    JOLEEN (acute kidney injury)    Transaminitis    Polysubstance abuse    Stroke    Chest pain        Josiah Richter is a 64 y.o. y.o. male who presents for  PFO closure evaluation for cryptogenic TIA/CVA without residual deficits x2 on Plavix therapy,    - S/p Medtronic REVEAL recorder 08/27/19, no arrhythmias as of 09/2019   - Family Hx of CVA x3 (mother), MI (Father @ 68)   - MRI / CT head / Carotids negative 06/20/19    - Need ILR recording upon scheduling to make sure no further arrhthymias identified  -- PFO closure with ICE, diagnostic LHC for chest pain  - Anti-platelet Therapy: Plavix only (thrombocytopenic)  - Access: R CFA / R CFV  - Catheters:  JR / JL, Super core, Pigtail, Transseptal puncture needle  - Creatinine/CrCl: 1.1  - Allergies: no contrast allergy  - Pre-Hydration: D5 1/2NS  125cc/hr  - Pre-Op Med: Benadryl  - All patient's questions were answered.  -The risks, benefits and alternatives of the procedure were explained to the patient.   -The risks of coronary angiography include but are not limited to: bleeding, infection, heart rhythm abnormalities, allergic reactions, kidney injury and potential need for dialysis, stroke and death.   - Should stenting be indicated, the patient has agreed to dual anti-platelet therapy for 1-consecutive year with a drug-eluting stent and a minimum of 1-month with the use of a bare metal stent  - Additionally, pt is aware that non-compliance is likely to result in stent clotting with heart attack, heart failure, and/or death  -The risks of moderate sedation include hypotension, respiratory depression, arrhythmias, bronchospasm, and death.   - Informed consent was obtained and the  patient is agreeable to proceed with the procedure.              Signed:  Juan Ramon Kulkarni M.D.  Page # [phone]  Cardiovascular Fellow PGY-IV  [facility]     Staff:  I have personally taken the history and examined this patient and agree with the fellow's note as stated above and amended it accordingly :-) He has had a YADI which isn't available at this moment, but I would like to review it or my notes on it as to whether there is also an ASA with the PFO and R to L shunt by bubble study.  Will review before the case.  His symptoms are unusual but have been labeled recurrent TIA's.  Since they occurred in the identical area of the brain both times, this would be unusual.

## 2019-10-14 NOTE — LETTER
October 14, 2019      Yazan Galvan MD  [address]  [place] [address]           [facility]  [address]  [facility] [address]  Phone: [phone]          Patient: Josiah Richter   MR Number: [id number]   YOB: 1955   Date of Visit: 10/14/2019       Dear Dr. Yazan Galvan:    Thank you for referring Josiah Richter to me for evaluation. Attached you will find relevant portions of my assessment and plan of care.    If you have questions, please do not hesitate to call me. I look forward to following Josiah Richter along with you.    Sincerely,    Juan Ramon Kulkarni MD    Enclosure  CC:  No Recipients    If you would like to receive this communication electronically, please contact [email or web address] or [phone] to request more information on [employer] Link access.    For providers and/or their staff who would like to refer a patient to [facility], please contact us through our one-stop-shop provider referral line, [facility], at [phone].    If you feel you have received this communication in error or would no longer like to receive these types of communications, please e-mail [email or web address]

## 2019-10-17 ENCOUNTER — HOSPITAL ENCOUNTER (OUTPATIENT)
Facility: HOSPITAL | Age: 64
Discharge: HOME | End: 2019-10-17
Attending: INTERNAL MEDICINE | Admitting: INTERNAL MEDICINE
Payer: MEDICARE

## 2019-10-17 VITALS
BODY MASS INDEX: 25.11 KG/M2 | TEMPERATURE: 98 F | RESPIRATION RATE: 18 BRPM | HEIGHT: 67 IN | SYSTOLIC BLOOD PRESSURE: 97 MMHG | HEART RATE: 61 BPM | DIASTOLIC BLOOD PRESSURE: 61 MMHG | OXYGEN SATURATION: 95 % | WEIGHT: 160 LBS

## 2019-10-17 DIAGNOSIS — R07.9 CHEST PAIN, UNSPECIFIED TYPE: ICD-10-CM

## 2019-10-17 DIAGNOSIS — Q21.12 PFO (PATENT FORAMEN OVALE): ICD-10-CM

## 2019-10-17 LAB
ABO + RH BLD: NORMAL
ANION GAP SERPL CALC-SCNC: 8 MMOL/L (ref 8–16)
BLD GP AB SCN CELLS X3 SERPL QL: NORMAL
BUN SERPL-MCNC: 15 MG/DL (ref 8–23)
CALCIUM SERPL-MCNC: 9.1 MG/DL (ref 8.7–10.5)
CHLORIDE SERPL-SCNC: 105 MMOL/L (ref 95–110)
CO2 SERPL-SCNC: 25 MMOL/L (ref 23–29)
CREAT SERPL-MCNC: 1.2 MG/DL (ref 0.5–1.4)
ERYTHROCYTE [DISTWIDTH] IN BLOOD BY AUTOMATED COUNT: 14.2 % (ref 11.5–14.5)
EST. GFR  (AFRICAN AMERICAN): >60 ML/MIN/1.73 M^2
EST. GFR  (NON AFRICAN AMERICAN): >60 ML/MIN/1.73 M^2
GLUCOSE SERPL-MCNC: 121 MG/DL (ref 70–110)
HCT VFR BLD AUTO: 42.2 % (ref 40–54)
HGB BLD-MCNC: 13.8 G/DL (ref 14–18)
INR PPP: 1.2 (ref 0.8–1.2)
MCH RBC QN AUTO: 32.3 PG (ref 27–31)
MCHC RBC AUTO-ENTMCNC: 32.7 G/DL (ref 32–36)
MCV RBC AUTO: 99 FL (ref 82–98)
PLATELET # BLD AUTO: 94 K/UL (ref 150–350)
PMV BLD AUTO: 10.5 FL (ref 9.2–12.9)
POCT GLUCOSE: 121 MG/DL (ref 70–110)
POTASSIUM SERPL-SCNC: 3.8 MMOL/L (ref 3.5–5.1)
PROTHROMBIN TIME: 12 SEC (ref 9–12.5)
RBC # BLD AUTO: 4.27 M/UL (ref 4.6–6.2)
SODIUM SERPL-SCNC: 138 MMOL/L (ref 136–145)
WBC # BLD AUTO: 6.52 K/UL (ref 3.9–12.7)

## 2019-10-17 PROCEDURE — 80048 BASIC METABOLIC PNL TOTAL CA: CPT

## 2019-10-17 PROCEDURE — 93662 PR INTRACARD ECHO, THER/DX INTERVENT: ICD-10-PCS | Mod: 26,,, | Performed by: INTERNAL MEDICINE

## 2019-10-17 PROCEDURE — 25000003 PHARM REV CODE 250: Performed by: INTERNAL MEDICINE

## 2019-10-17 PROCEDURE — 93010 EKG 12-LEAD: ICD-10-PCS | Mod: ,,, | Performed by: INTERNAL MEDICINE

## 2019-10-17 PROCEDURE — 85027 COMPLETE CBC AUTOMATED: CPT

## 2019-10-17 PROCEDURE — 99152 PR MOD CONSCIOUS SEDATION, SAME PHYS, 5+ YRS, FIRST 15 MIN: ICD-10-PCS | Mod: ,,, | Performed by: INTERNAL MEDICINE

## 2019-10-17 PROCEDURE — 86850 RBC ANTIBODY SCREEN: CPT

## 2019-10-17 PROCEDURE — 99153 MOD SED SAME PHYS/QHP EA: CPT | Performed by: INTERNAL MEDICINE

## 2019-10-17 PROCEDURE — C1769 GUIDE WIRE: HCPCS | Performed by: INTERNAL MEDICINE

## 2019-10-17 PROCEDURE — 93580 PR PERC CLOS,CONG INTERATRIAL COMMUN W/IMPL: ICD-10-PCS | Mod: ,,, | Performed by: INTERNAL MEDICINE

## 2019-10-17 PROCEDURE — 93005 ELECTROCARDIOGRAM TRACING: CPT | Mod: 59

## 2019-10-17 PROCEDURE — 82962 GLUCOSE BLOOD TEST: CPT

## 2019-10-17 PROCEDURE — 99152 MOD SED SAME PHYS/QHP 5/>YRS: CPT | Mod: ,,, | Performed by: INTERNAL MEDICINE

## 2019-10-17 PROCEDURE — S5010 5% DEXTROSE AND 0.45% SALINE: HCPCS | Performed by: INTERNAL MEDICINE

## 2019-10-17 PROCEDURE — 99152 MOD SED SAME PHYS/QHP 5/>YRS: CPT | Performed by: INTERNAL MEDICINE

## 2019-10-17 PROCEDURE — 93454 CORONARY ARTERY ANGIO S&I: CPT | Mod: 59 | Performed by: INTERNAL MEDICINE

## 2019-10-17 PROCEDURE — 93580 TRANSCATH CLOSURE OF ASD: CPT | Mod: ,,, | Performed by: INTERNAL MEDICINE

## 2019-10-17 PROCEDURE — 63600175 PHARM REV CODE 636 W HCPCS: Performed by: INTERNAL MEDICINE

## 2019-10-17 PROCEDURE — 93454 PR CATH PLACE/CORONARY ANGIO, IMG SUPER/INTERP: ICD-10-PCS | Mod: 26,59,51, | Performed by: INTERNAL MEDICINE

## 2019-10-17 PROCEDURE — 93454 CORONARY ARTERY ANGIO S&I: CPT | Mod: 26,59,51, | Performed by: INTERNAL MEDICINE

## 2019-10-17 PROCEDURE — 85610 PROTHROMBIN TIME: CPT

## 2019-10-17 PROCEDURE — C1760 CLOSURE DEV, VASC: HCPCS | Performed by: INTERNAL MEDICINE

## 2019-10-17 PROCEDURE — C1817 SEPTAL DEFECT IMP SYS: HCPCS | Performed by: INTERNAL MEDICINE

## 2019-10-17 PROCEDURE — 93010 ELECTROCARDIOGRAM REPORT: CPT | Mod: ,,, | Performed by: INTERNAL MEDICINE

## 2019-10-17 PROCEDURE — 93662 INTRACARDIAC ECG (ICE): CPT | Performed by: INTERNAL MEDICINE

## 2019-10-17 PROCEDURE — C1894 INTRO/SHEATH, NON-LASER: HCPCS | Performed by: INTERNAL MEDICINE

## 2019-10-17 PROCEDURE — 93662 INTRACARDIAC ECG (ICE): CPT | Mod: 26,,, | Performed by: INTERNAL MEDICINE

## 2019-10-17 PROCEDURE — 27201423 OPTIME MED/SURG SUP & DEVICES STERILE SUPPLY: Performed by: INTERNAL MEDICINE

## 2019-10-17 PROCEDURE — 93580 TRANSCATH CLOSURE OF ASD: CPT | Performed by: INTERNAL MEDICINE

## 2019-10-17 PROCEDURE — C1753 CATH, INTRAVAS ULTRASOUND: HCPCS | Performed by: INTERNAL MEDICINE

## 2019-10-17 DEVICE — IMPLANTABLE DEVICE
Type: IMPLANTABLE DEVICE | Site: HEART | Status: FUNCTIONAL
Brand: AMPLATZER™

## 2019-10-17 RX ORDER — CEFAZOLIN SODIUM 1 G/3ML
INJECTION, POWDER, FOR SOLUTION INTRAMUSCULAR; INTRAVENOUS
Status: DISCONTINUED | OUTPATIENT
Start: 2019-10-17 | End: 2019-10-17 | Stop reason: HOSPADM

## 2019-10-17 RX ORDER — DEXTROSE MONOHYDRATE AND SODIUM CHLORIDE 5; .45 G/100ML; G/100ML
INJECTION, SOLUTION INTRAVENOUS CONTINUOUS
Status: DISCONTINUED | OUTPATIENT
Start: 2019-10-17 | End: 2019-10-17 | Stop reason: HOSPADM

## 2019-10-17 RX ORDER — DIPHENHYDRAMINE HCL 50 MG
50 CAPSULE ORAL ONCE
Status: COMPLETED | OUTPATIENT
Start: 2019-10-17 | End: 2019-10-17

## 2019-10-17 RX ORDER — MIDAZOLAM HYDROCHLORIDE 1 MG/ML
INJECTION, SOLUTION INTRAMUSCULAR; INTRAVENOUS
Status: DISCONTINUED | OUTPATIENT
Start: 2019-10-17 | End: 2019-10-17 | Stop reason: HOSPADM

## 2019-10-17 RX ORDER — LIDOCAINE HYDROCHLORIDE 20 MG/ML
INJECTION, SOLUTION INFILTRATION; PERINEURAL
Status: DISCONTINUED | OUTPATIENT
Start: 2019-10-17 | End: 2019-10-17 | Stop reason: HOSPADM

## 2019-10-17 RX ORDER — FENTANYL CITRATE 50 UG/ML
INJECTION, SOLUTION INTRAMUSCULAR; INTRAVENOUS
Status: DISCONTINUED | OUTPATIENT
Start: 2019-10-17 | End: 2019-10-17 | Stop reason: HOSPADM

## 2019-10-17 RX ORDER — HEPARIN SODIUM 200 [USP'U]/100ML
INJECTION, SOLUTION INTRAVENOUS
Status: DISCONTINUED | OUTPATIENT
Start: 2019-10-17 | End: 2019-10-17 | Stop reason: HOSPADM

## 2019-10-17 RX ORDER — HEPARIN SODIUM 1000 [USP'U]/ML
INJECTION, SOLUTION INTRAVENOUS; SUBCUTANEOUS
Status: DISCONTINUED | OUTPATIENT
Start: 2019-10-17 | End: 2019-10-17 | Stop reason: HOSPADM

## 2019-10-17 RX ADMIN — DIPHENHYDRAMINE HYDROCHLORIDE 50 MG: 50 CAPSULE ORAL at 11:10

## 2019-10-17 RX ADMIN — DEXTROSE AND SODIUM CHLORIDE: 5; .45 INJECTION, SOLUTION INTRAVENOUS at 11:10

## 2019-10-17 NOTE — INTERVAL H&P NOTE
The patient has been examined and the H&P has been reviewed:    I concur with the findings and no changes have occurred since H&P was written.    Anesthesia/Surgery risks, benefits and alternative options discussed and understood by patient/family.    R CFA diagnostic coronary angiogram   Consent in media   NPO since MN  Took Plavix 75mg yesterday           There are no hospital problems to display for this patient.

## 2019-10-17 NOTE — DISCHARGE SUMMARY
[facility]  Interventional Cardiology  Discharge Summary      Patient Name: Josiah Richter  MRN: [id number]  Admission Date: 10/17/2019  Hospital Length of Stay: 0 days  Discharge Date and Time:  10/17/2019 5:01 PM  Attending Physician: William Edmonds MD  Discharging Provider: Juan Ramon Estrada MD  Primary Care Physician: Tierra Max MD    HPI:  Josiah Richter is a 64 y.o. y.o. male who presents for  PFO closure evaluation.               - PMHx of cryptogenic TIA/CVA without residual deficits x2 on Plavix therapy, s/p [employer] REVEAL recorder 08/27/19), NIDDM type II, Epilepsy, Hep C with chronic thrombocytopenia and chronic elevated transaminases, polysubstance abuse (Cocaine / THC) on Suboxone              - Family Hx of CVA x3 (mother), MI (Father @ 68)              - Social Hx of former smoker (1.5ppd x 20 years), Retired from construction     Presented to [facility] with dysrthria and focal weakness consistent with TIC, MRI was negative for acute CVA, seen by neurologist Dr. Tony though to be a TIA started on ASA / Statin. TTE with bubble positive for intra-atrial shunt. Then again presented on 06/20/19 with transient left sided weakness and slurred speech, consistent with TIA symptoms. Not compliant with Keppra for seizures, last seizure. Following this underwent YADI which demonstrated PFO and ILR placement. Patient doing well. No further recurrence of neurological symptoms. However having off / on left sided chest pain at rest and with exertional activity, discomfort could last for hours. Not relieved by anything. No prior coronary angiogram    Procedure(s) (LRB):  Left heart cath (N/A)     Hospital Course:  Mr. Richter is a 64 yoM, underwent R CFA coronary angiogram which demonstrated no significant coronary disease, luminal irregularity in the proximal and mid LAD.  There is also multiple areas of calcification in the ascending and descending aorta. S/p successful closure of a 5 mm PFO  with an 18 mm Amplatzer PFO closure device with intracardiac echo used for placement. Post device no right to left shunt post closure.   3.  Intracardiac echo was used and there was no right-to-left shunt post closure.    1.  Plavix anti-platelet therapy for 6 months (Hep C with thrombocytopenia)  2.  Trans thoracic echo with bubble study and follow-up with me in 6 months  3. Follow up with Dr. Galvan    Significant Diagnostic Studies: Labs:   CMP   Recent Labs   Lab 10/17/19  1049      K 3.8      CO2 25   *   BUN 15   CREATININE 1.2   CALCIUM 9.1   ANIONGAP 8   ESTGFRAFRICA >60.0   EGFRNONAA >60.0   , CBC   Recent Labs   Lab 10/17/19  1050   WBC 6.52   HGB 13.8*   HCT 42.2   PLT 94*   , INR   Lab Results   Component Value Date    INR 1.2 10/17/2019    INR 1.1 10/14/2019    INR 1.1 06/07/2019    and Lipid Panel   Lab Results   Component Value Date    CHOL 119 (L) 03/13/2015    HDL 28 (L) 03/13/2015    LDLCALC 71.4 03/13/2015    TRIG 98 03/13/2015    CHOLHDL 23.5 03/13/2015       Pending Diagnostic Studies:     None        No new Assessment & Plan notes have been filed under this hospital service since the last note was generated.  Service: Interventional Cardiology      Discharged Condition: good    Follow Up:    Patient Instructions:   No discharge procedures on file.  Medications:  Reconciled Home Medications:      Medication List      CONTINUE taking these medications    albuterol 90 mcg/actuation inhaler  Commonly known as:  PROVENTIL/VENTOLIN HFA  Inhale 1-2 puffs into the lungs every 6 (six) hours as needed for Wheezing. Rescue     atorvastatin 20 MG tablet  Commonly known as:  LIPITOR  Take 20 mg by mouth.     benzonatate 100 MG capsule  Commonly known as:  TESSALON PERLES  Take 1 capsule (100 mg total) by mouth 3 (three) times daily as needed for Cough.     clopidogrel 75 mg tablet  Commonly known as:  PLAVIX  Take 1 tablet (75 mg total) by mouth once daily.     gabapentin 300 MG  capsule  Commonly known as:  NEURONTIN  TK 1 C PO TID PRN     ibuprofen 800 MG tablet  Commonly known as:  ADVIL,MOTRIN  Take 1 tablet (800 mg total) by mouth every 6 (six) hours as needed for Pain.     ipratropium 42 mcg (0.06 %) nasal spray  Commonly known as:  ATROVENT  2 sprays by Nasal route 4 (four) times daily.     levETIRAcetam 500 MG Tab  Commonly known as:  KEPPRA  Take by mouth.     SUBOXONE 8-2 mg Film  Generic drug:  buprenorphine-naloxone  DISSOLVE 1 FILM UNT TID            Time spent on the discharge of patient: 45 minutes    Juan Ramon Estrada MD  Interventional Cardiology  [facility]

## 2019-10-17 NOTE — NURSING
Ooze to rt groin.  Gauze dressing saturated.  Dr Kulkarni at bedside, holding pressure to rt groin x20 minutes.

## 2019-10-17 NOTE — NURSING
Slow track ooze to rt groin, Dr Kulkarni at bedside, pressure held x5 minutes, glue placed.  Report given to Rosa.

## 2020-01-03 DIAGNOSIS — E11.9 TYPE 2 DIABETES MELLITUS WITHOUT COMPLICATION: ICD-10-CM

## 2020-02-04 ENCOUNTER — TELEPHONE (OUTPATIENT)
Dept: PHYSICAL MEDICINE AND REHAB | Facility: CLINIC | Age: 65
End: 2020-02-04

## 2020-02-04 NOTE — TELEPHONE ENCOUNTER
New Patient.  Patient has a scheduled appointment.  Patient added to wait list for a cancellation.      ----- Message from Maricarmen Lira sent at 2/4/2020  9:09 AM CST -----  Contact: [phone]  Patient is requesting a call back concerning scheduling a new pt appt sooner than the given one on 4/27. Please call

## 2020-02-06 ENCOUNTER — HOSPITAL ENCOUNTER (EMERGENCY)
Facility: HOSPITAL | Age: 65
Discharge: HOME | End: 2020-02-06
Attending: EMERGENCY MEDICINE
Payer: MEDICARE

## 2020-02-06 VITALS
HEIGHT: 67 IN | WEIGHT: 170 LBS | BODY MASS INDEX: 26.68 KG/M2 | DIASTOLIC BLOOD PRESSURE: 58 MMHG | TEMPERATURE: 98 F | OXYGEN SATURATION: 91 % | HEART RATE: 76 BPM | SYSTOLIC BLOOD PRESSURE: 111 MMHG | RESPIRATION RATE: 20 BRPM

## 2020-02-06 DIAGNOSIS — R10.9 RIGHT FLANK PAIN: Primary | ICD-10-CM

## 2020-02-06 DIAGNOSIS — N39.0 URINARY TRACT INFECTION WITHOUT HEMATURIA, SITE UNSPECIFIED: ICD-10-CM

## 2020-02-06 DIAGNOSIS — S39.012A LUMBAR STRAIN, INITIAL ENCOUNTER: ICD-10-CM

## 2020-02-06 LAB
ALBUMIN SERPL BCP-MCNC: 3.7 G/DL (ref 3.5–5.2)
ALP SERPL-CCNC: 80 U/L (ref 55–135)
ALT SERPL W/O P-5'-P-CCNC: 27 U/L (ref 10–44)
ANION GAP SERPL CALC-SCNC: 9 MMOL/L (ref 8–16)
AST SERPL-CCNC: 30 U/L (ref 10–40)
BACTERIA #/AREA URNS AUTO: ABNORMAL /HPF
BASOPHILS # BLD AUTO: 0.03 K/UL (ref 0–0.2)
BASOPHILS NFR BLD: 0.3 % (ref 0–1.9)
BILIRUB SERPL-MCNC: 0.7 MG/DL (ref 0.1–1)
BILIRUB UR QL STRIP: NEGATIVE
BUN SERPL-MCNC: 16 MG/DL (ref 8–23)
CALCIUM SERPL-MCNC: 8.9 MG/DL (ref 8.7–10.5)
CHLORIDE SERPL-SCNC: 107 MMOL/L (ref 95–110)
CLARITY UR REFRACT.AUTO: ABNORMAL
CO2 SERPL-SCNC: 25 MMOL/L (ref 23–29)
COLOR UR AUTO: YELLOW
CREAT SERPL-MCNC: 1.2 MG/DL (ref 0.5–1.4)
DIFFERENTIAL METHOD: ABNORMAL
EOSINOPHIL # BLD AUTO: 0.2 K/UL (ref 0–0.5)
EOSINOPHIL NFR BLD: 2 % (ref 0–8)
ERYTHROCYTE [DISTWIDTH] IN BLOOD BY AUTOMATED COUNT: 13.2 % (ref 11.5–14.5)
EST. GFR  (AFRICAN AMERICAN): >60 ML/MIN/1.73 M^2
EST. GFR  (NON AFRICAN AMERICAN): >60 ML/MIN/1.73 M^2
GLUCOSE SERPL-MCNC: 189 MG/DL (ref 70–110)
GLUCOSE UR QL STRIP: ABNORMAL
HCT VFR BLD AUTO: 46.5 % (ref 40–54)
HGB BLD-MCNC: 15.1 G/DL (ref 14–18)
HGB UR QL STRIP: ABNORMAL
IMM GRANULOCYTES # BLD AUTO: 0.03 K/UL (ref 0–0.04)
IMM GRANULOCYTES NFR BLD AUTO: 0.3 % (ref 0–0.5)
KETONES UR QL STRIP: NEGATIVE
LEUKOCYTE ESTERASE UR QL STRIP: ABNORMAL
LYMPHOCYTES # BLD AUTO: 2.3 K/UL (ref 1–4.8)
LYMPHOCYTES NFR BLD: 24.8 % (ref 18–48)
MCH RBC QN AUTO: 33 PG (ref 27–31)
MCHC RBC AUTO-ENTMCNC: 32.5 G/DL (ref 32–36)
MCV RBC AUTO: 102 FL (ref 82–98)
MICROSCOPIC COMMENT: ABNORMAL
MONOCYTES # BLD AUTO: 0.6 K/UL (ref 0.3–1)
MONOCYTES NFR BLD: 5.8 % (ref 4–15)
NEUTROPHILS # BLD AUTO: 6.3 K/UL (ref 1.8–7.7)
NEUTROPHILS NFR BLD: 66.8 % (ref 38–73)
NITRITE UR QL STRIP: NEGATIVE
NRBC BLD-RTO: 0 /100 WBC
PH UR STRIP: 6 [PH] (ref 5–8)
PLATELET # BLD AUTO: 103 K/UL (ref 150–350)
PMV BLD AUTO: 10.5 FL (ref 9.2–12.9)
POTASSIUM SERPL-SCNC: 4.3 MMOL/L (ref 3.5–5.1)
PROT SERPL-MCNC: 8 G/DL (ref 6–8.4)
PROT UR QL STRIP: NEGATIVE
RBC # BLD AUTO: 4.58 M/UL (ref 4.6–6.2)
RBC #/AREA URNS AUTO: 67 /HPF (ref 0–4)
SODIUM SERPL-SCNC: 141 MMOL/L (ref 136–145)
SP GR UR STRIP: 1.02 (ref 1–1.03)
SQUAMOUS #/AREA URNS AUTO: 2 /HPF
URN SPEC COLLECT METH UR: ABNORMAL
WBC # BLD AUTO: 9.43 K/UL (ref 3.9–12.7)
WBC #/AREA URNS AUTO: 14 /HPF (ref 0–5)

## 2020-02-06 PROCEDURE — 85025 COMPLETE CBC W/AUTO DIFF WBC: CPT

## 2020-02-06 PROCEDURE — 87086 URINE CULTURE/COLONY COUNT: CPT

## 2020-02-06 PROCEDURE — 87088 URINE BACTERIA CULTURE: CPT

## 2020-02-06 PROCEDURE — 96376 TX/PRO/DX INJ SAME DRUG ADON: CPT

## 2020-02-06 PROCEDURE — 96375 TX/PRO/DX INJ NEW DRUG ADDON: CPT

## 2020-02-06 PROCEDURE — 99285 PR EMERGENCY DEPT VISIT,LEVEL V: ICD-10-PCS | Mod: ,,, | Performed by: EMERGENCY MEDICINE

## 2020-02-06 PROCEDURE — 99285 EMERGENCY DEPT VISIT HI MDM: CPT | Mod: ,,, | Performed by: EMERGENCY MEDICINE

## 2020-02-06 PROCEDURE — 63600175 PHARM REV CODE 636 W HCPCS: Performed by: EMERGENCY MEDICINE

## 2020-02-06 PROCEDURE — 96374 THER/PROPH/DIAG INJ IV PUSH: CPT

## 2020-02-06 PROCEDURE — 96361 HYDRATE IV INFUSION ADD-ON: CPT

## 2020-02-06 PROCEDURE — 87077 CULTURE AEROBIC IDENTIFY: CPT

## 2020-02-06 PROCEDURE — 81001 URINALYSIS AUTO W/SCOPE: CPT

## 2020-02-06 PROCEDURE — 87186 SC STD MICRODIL/AGAR DIL: CPT

## 2020-02-06 PROCEDURE — 99284 EMERGENCY DEPT VISIT MOD MDM: CPT | Mod: 25

## 2020-02-06 PROCEDURE — 80053 COMPREHEN METABOLIC PANEL: CPT

## 2020-02-06 RX ORDER — ONDANSETRON 2 MG/ML
4 INJECTION INTRAMUSCULAR; INTRAVENOUS
Status: COMPLETED | OUTPATIENT
Start: 2020-02-06 | End: 2020-02-06

## 2020-02-06 RX ORDER — CYCLOBENZAPRINE HCL 10 MG
10 TABLET ORAL 3 TIMES DAILY PRN
Qty: 9 TABLET | Refills: 0 | Status: SHIPPED | OUTPATIENT
Start: 2020-02-06 | End: 2020-02-11

## 2020-02-06 RX ORDER — KETOROLAC TROMETHAMINE 30 MG/ML
15 INJECTION, SOLUTION INTRAMUSCULAR; INTRAVENOUS
Status: COMPLETED | OUTPATIENT
Start: 2020-02-06 | End: 2020-02-06

## 2020-02-06 RX ORDER — KETOROLAC TROMETHAMINE 10 MG/1
10 TABLET, FILM COATED ORAL EVERY 6 HOURS
Qty: 15 TABLET | Refills: 0 | Status: SHIPPED | OUTPATIENT
Start: 2020-02-06

## 2020-02-06 RX ORDER — CIPROFLOXACIN 500 MG/1
500 TABLET ORAL 2 TIMES DAILY
Qty: 20 TABLET | Refills: 0 | Status: SHIPPED | OUTPATIENT
Start: 2020-02-06 | End: 2020-02-07

## 2020-02-06 RX ORDER — HYDROCODONE BITARTRATE AND ACETAMINOPHEN 7.5; 325 MG/1; MG/1
1 TABLET ORAL EVERY 6 HOURS PRN
Qty: 10 TABLET | Refills: 0 | Status: SHIPPED | OUTPATIENT
Start: 2020-02-06

## 2020-02-06 RX ORDER — MORPHINE SULFATE 4 MG/ML
4 INJECTION, SOLUTION INTRAMUSCULAR; INTRAVENOUS
Status: COMPLETED | OUTPATIENT
Start: 2020-02-06 | End: 2020-02-06

## 2020-02-06 RX ADMIN — ONDANSETRON 4 MG: 2 INJECTION INTRAMUSCULAR; INTRAVENOUS at 07:02

## 2020-02-06 RX ADMIN — SODIUM CHLORIDE 1000 ML: 0.9 INJECTION, SOLUTION INTRAVENOUS at 07:02

## 2020-02-06 RX ADMIN — MORPHINE SULFATE 4 MG: 4 INJECTION, SOLUTION INTRAMUSCULAR; INTRAVENOUS at 09:02

## 2020-02-06 RX ADMIN — MORPHINE SULFATE 4 MG: 4 INJECTION, SOLUTION INTRAMUSCULAR; INTRAVENOUS at 07:02

## 2020-02-06 RX ADMIN — KETOROLAC TROMETHAMINE 15 MG: 30 INJECTION, SOLUTION INTRAMUSCULAR; INTRAVENOUS at 07:02

## 2020-02-06 NOTE — ED NOTES
Pt resting on recliner in NAD, respirations even and unlabored. IV fluids infusing. Will continue to monitor and update pt on plan of care.

## 2020-02-06 NOTE — ED NOTES
Pt restless seated in recliner, stomping feet, pacing throughout unit. Pt reports pain 10/10 at this time. MD notified

## 2020-02-06 NOTE — ED NOTES
Patient identifiers verified and correct for Mr Richter  C/C: Right flank pain SEE NN  APPEARANCE: awake and alert in NAD.  SKIN: warm, dry and intact. No breakdown or bruising.  MUSCULOSKELETAL: Patient moving all extremities spontaneously, no obvious swelling or deformities noted. Ambulates independently.  RESPIRATORY: Denies shortness of breath.Respirations unlabored.   CARDIAC: Denies CP, 2+ distal pulses; no peripheral edema  ABDOMEN: Right flank pain positive nausea, no emesis  : voids spontaneously, denies difficulty  Neurologic: AAO x 4; follows commands equal strength in all extremities; denies numbness/tingling. Denies dizziness Denies weakness

## 2020-02-06 NOTE — ED NOTES
"Pt calling out, standing hunched over in middle of walk way, pt states "I can't stand it anymore, I need something". RN escorted patient back to recliner, instructed to stay seated to avoid risk of falling. MD informed, will wait for further orders.   "

## 2020-02-06 NOTE — ED TRIAGE NOTES
Patient states onset right flank pain onset this am, vomiting last night. States difficulty with urination,No OTC meds. Denies suboxone use

## 2020-02-06 NOTE — ED NOTES
Pt at nurses station reporting pain level increased to 10/10, pt requesting update on time frame for results. RN informed patient on wait for CT results, evaluation by MD. Will continue to monitor and update pt on plan of care.

## 2020-02-06 NOTE — ED NOTES
Pt requesting update on results, RN informed patient of wait time for results. MD informed patient of wait time for results and reavaluation. Pt standing, pacing. IV infusion continuous. Will continue to monitor and update pt on plan of care.

## 2020-02-06 NOTE — ED PROVIDER NOTES
"Encounter Date: 2/6/2020       History     Chief Complaint   Patient presents with    Flank Pain     R sided that began this AM when he woke up, history of kidney stones. reports some nausea     The history is provided by the patient.   Male  Problem   Primary symptoms include no dysuria, no penile discharge, no scrotal pain. This is a new problem. The current episode started yesterday. The problem occurs intermittently. The problem has been gradually worsening. Context: after throwing up. Associated symptoms include nausea and vomiting. Pertinent negatives include no anorexia, no abdominal pain, no abdominal swelling, no constipation and no diarrhea. He has tried nothing for the symptoms.     Patient states he had 1 episode of emesis yesterday and after he threw up he started having a right sided low back pain. He has a history of kidney stones multiple times in the past and he states this is similar to the pain that he had when he has had kidney stones in the past.      Review of patient's allergies indicates:   Allergen Reactions    Eggs [egg derived] Anaphylaxis    Mayonnaise Anaphylaxis    Milk containing products Anaphylaxis    Mustard Anaphylaxis     Past Medical History:   Diagnosis Date    Anxiety     Cirrhosis     Diabetes mellitus 2017    Diabetes mellitus, type 2     HCV (hepatitis C virus)     Heart damage     "hole in heart"    Kidney stone     s/p L ureteral stent placement on 5/29/2018. Infection cleared and he subsequently had a stent removal    Urinary tract infection      Past Surgical History:   Procedure Laterality Date    INTRACARDIAC ECHOCARDIOGRAPHY N/A 10/17/2019    Procedure: Intracardiac echocardiogram;  Surgeon: William Edmonds MD;  Location: [facility] CATH LAB;  Service: Cardiology;  Laterality: N/A;    KIDNEY STONE SURGERY      LEFT HEART CATHETERIZATION N/A 10/17/2019    Procedure: Left heart cath;  Surgeon: William Edmonds MD;  Location: [facility] CATH LAB;  Service: " "Cardiology;  Laterality: N/A;    NECK SURGERY      secondary to previous fall     Family History   Problem Relation Age of Onset    Hypertension Mother     Diabetes Mother     Stroke Mother     Coronary artery disease Father         CABG    Prostate cancer Neg Hx     Kidney disease Neg Hx     Colon cancer Neg Hx      Social History     Tobacco Use    Smoking status: Current Every Day Smoker     Packs/day: 0.50     Years: 40.00     Pack years: 20.00     Types: Cigarettes     Start date:      Last attempt to quit: 2019     Years since quittin.9    Smokeless tobacco: Never Used    Tobacco comment: states "very few"    Substance Use Topics    Alcohol use: No    Drug use: Not Currently     Types: Oxycodone     Review of Systems   Constitutional: Negative for fever.   HENT: Negative for sore throat.    Respiratory: Negative for shortness of breath.    Cardiovascular: Negative for chest pain.   Gastrointestinal: Positive for nausea and vomiting. Negative for abdominal pain, anorexia, constipation and diarrhea.   Genitourinary: Positive for flank pain. Negative for dysuria, hematuria, penile discharge, testicular pain and urgency.   Musculoskeletal: Negative for back pain.   Skin: Negative for rash.   Neurological: Negative for weakness and headaches.   Hematological: Does not bruise/bleed easily.   All other systems reviewed and are negative.      Physical Exam     Initial Vitals [20 0651]   BP Pulse Resp Temp SpO2   (!) 146/70 87 18 97.6 °F (36.4 °C) 98 %      MAP       --         Vitals:    20 0651 20 0800   BP: (!) 146/70 (!) 111/58   BP Location: Right arm Right arm   Patient Position: Sitting Sitting   Pulse: 87 76   Resp: 18 20   Temp: 97.6 °F (36.4 °C)    TempSrc: Oral    SpO2: 98% (!) 91%   Weight: 77.1 kg (170 lb)    Height: 5' 7" (1.702 m)      Physical Exam    Nursing note and vitals reviewed.  Constitutional: He appears well-developed and well-nourished. "   uncomfortable   HENT:   Head: Normocephalic and atraumatic.   Mouth/Throat: Oropharynx is clear and moist.   Eyes: EOM are normal. Pupils are equal, round, and reactive to light.   Neck: Normal range of motion. Neck supple.   Cardiovascular: Normal rate, regular rhythm, normal heart sounds and intact distal pulses.   Pulmonary/Chest: Breath sounds normal. No respiratory distress. He has no wheezes. He has no rhonchi.   Abdominal: Soft. Bowel sounds are normal. There is no rebound.   Musculoskeletal: Normal range of motion. He exhibits no edema.        Lumbar back: He exhibits tenderness, pain and spasm. He exhibits normal range of motion, no bony tenderness, no swelling and no laceration.        Back:    Neurological: He is alert and oriented to person, place, and time. He has normal strength. No cranial nerve deficit or sensory deficit.   Skin: Skin is warm and dry. Capillary refill takes less than 2 seconds. No rash noted.   Psychiatric: He has a normal mood and affect. His behavior is normal. Judgment and thought content normal.         ED Course   Procedures  Labs Reviewed   CBC W/ AUTO DIFFERENTIAL - Abnormal; Notable for the following components:       Result Value    RBC 4.58 (*)     Mean Corpuscular Volume 102 (*)     Mean Corpuscular Hemoglobin 33.0 (*)     Platelets 103 (*)     All other components within normal limits   COMPREHENSIVE METABOLIC PANEL - Abnormal; Notable for the following components:    Glucose 189 (*)     All other components within normal limits   URINALYSIS, REFLEX TO URINE CULTURE - Abnormal; Notable for the following components:    Appearance, UA Hazy (*)     Glucose, UA 2+ (*)     Occult Blood UA 2+ (*)     Leukocytes, UA Trace (*)     All other components within normal limits    Narrative:     Preferred Collection Type->Urine, Clean Catch   URINALYSIS MICROSCOPIC - Abnormal; Notable for the following components:    RBC, UA 67 (*)     WBC, UA 14 (*)     All other components within  normal limits    Narrative:     Preferred Collection Type->Urine, Clean Catch   CULTURE, URINE         Results for orders placed or performed during the hospital encounter of 02/06/20   CBC auto differential   Result Value Ref Range    WBC 9.43 3.90 - 12.70 K/uL    RBC 4.58 (L) 4.60 - 6.20 M/uL    Hemoglobin 15.1 14.0 - 18.0 g/dL    Hematocrit 46.5 40.0 - 54.0 %    Mean Corpuscular Volume 102 (H) 82 - 98 fL    Mean Corpuscular Hemoglobin 33.0 (H) 27.0 - 31.0 pg    Mean Corpuscular Hemoglobin Conc 32.5 32.0 - 36.0 g/dL    RDW 13.2 11.5 - 14.5 %    Platelets 103 (L) 150 - 350 K/uL    MPV 10.5 9.2 - 12.9 fL    Immature Granulocytes 0.3 0.0 - 0.5 %    Gran # (ANC) 6.3 1.8 - 7.7 K/uL    Immature Grans (Abs) 0.03 0.00 - 0.04 K/uL    Lymph # 2.3 1.0 - 4.8 K/uL    Mono # 0.6 0.3 - 1.0 K/uL    Eos # 0.2 0.0 - 0.5 K/uL    Baso # 0.03 0.00 - 0.20 K/uL    nRBC 0 0 /100 WBC    Gran% 66.8 38.0 - 73.0 %    Lymph% 24.8 18.0 - 48.0 %    Mono% 5.8 4.0 - 15.0 %    Eosinophil% 2.0 0.0 - 8.0 %    Basophil% 0.3 0.0 - 1.9 %    Differential Method Automated    Comprehensive metabolic panel   Result Value Ref Range    Sodium 141 136 - 145 mmol/L    Potassium 4.3 3.5 - 5.1 mmol/L    Chloride 107 95 - 110 mmol/L    CO2 25 23 - 29 mmol/L    Glucose 189 (H) 70 - 110 mg/dL    BUN, Bld 16 8 - 23 mg/dL    Creatinine 1.2 0.5 - 1.4 mg/dL    Calcium 8.9 8.7 - 10.5 mg/dL    Total Protein 8.0 6.0 - 8.4 g/dL    Albumin 3.7 3.5 - 5.2 g/dL    Total Bilirubin 0.7 0.1 - 1.0 mg/dL    Alkaline Phosphatase 80 55 - 135 U/L    AST 30 10 - 40 U/L    ALT 27 10 - 44 U/L    Anion Gap 9 8 - 16 mmol/L    eGFR if African American >60.0 >60 mL/min/1.73 m^2    eGFR if non African American >60.0 >60 mL/min/1.73 m^2   Urinalysis, Reflex to Urine Culture Urine, Clean Catch   Result Value Ref Range    Specimen UA Urine, Clean Catch     Color, UA Yellow Yellow, Straw, Bambi    Appearance, UA Hazy (A) Clear    pH, UA 6.0 5.0 - 8.0    Specific Gravity, UA 1.020 1.005 - 1.030     Protein, UA Negative Negative    Glucose, UA 2+ (A) Negative    Ketones, UA Negative Negative    Bilirubin (UA) Negative Negative    Occult Blood UA 2+ (A) Negative    Nitrite, UA Negative Negative    Leukocytes, UA Trace (A) Negative   Urinalysis Microscopic   Result Value Ref Range    RBC, UA 67 (H) 0 - 4 /hpf    WBC, UA 14 (H) 0 - 5 /hpf    Bacteria Occasional None-Occ /hpf    Squam Epithel, UA 2 /hpf    Microscopic Comment SEE COMMENT         Imaging Results          CT Renal Stone Study ABD Pelvis WO (Final result)  Result time 02/06/20 09:05:10    Final result by Rigo Crockett MD (02/06/20 09:05:10)                 Impression:      1. Bilateral nephrolithiasis with no evidence of hydronephrosis or ureteral stones.  Right kidney is smaller in size with cortical irregularity.  2. Fibrotic bands in the right middle lobe, consistent with scarring tissue.  3. Prominent common hepatic duct with no evidence of choledocholithiasis.  4. Cystic and degenerative disease of the left femoral head.  5. Additional findings as above.    Electronically signed by resident: Jack Leonard  Date:    02/06/2020  Time:    08:28    Electronically signed by: Rigo Crockett MD  Date:    02/06/2020  Time:    09:05             Narrative:    EXAMINATION:  CT RENAL STONE STUDY ABD PELVIS WO    CLINICAL HISTORY:  Flank pain, recurrent stone disease suspected;    TECHNIQUE:  Low dose axial images, sagittal and coronal reformations were obtained from the lung bases to the pubic symphysis.  Contrast was not administered.    COMPARISON:  CT renal stone study: 07/18/2004.  CT chest: 02/25/2019.    FINDINGS:  Heart: Metallic device is identified in this patient with a reported history of patent foramen ovale s/p Amplatzer plug.  No pericardial effusion.    Lung Bases: Fibrotic bands are identified along the medial segment of the right middle lobe (axial series 2, image 8-12) consistent with scarring tissue.  Otherwise, lung bases are well aerated  with no evidence of pleural fluid.    Liver: Normal in size and attenuation, with no focal hepatic lesions.    Gallbladder: No calcified gallstones.    Bile Ducts: Prominent common hepatic duct, measures 0.9 cm (coronal series 4, image 19).  No evidence of choledocholithiasis.    Pancreas: No mass or peripancreatic fat stranding.    Spleen: At the upper limits of normal in size.    Adrenals: Unremarkable.    Kidneys/ Ureters:    Right kidney: Smaller in size with cortical irregularity.  There are multiple hypodense lesions, too small to characterize but most likely represent renal cysts.  0.4 cm nonobstructive calculus is identified in the inferior pole (axial series 2, image 43).  No evidence of ureteral stones or hydronephrosis.    Left kidney: Normal in size and configuration.  There are 2 nonobstructive calculi, the largest located in the inferior pole and measures 0.6 cm (axial series 2, image 46).  No evidence of ureteral stones or hydronephrosis.    Bladder: No evidence of wall thickening.    Reproductive organs: Prostate calcifications.  Otherwise, unremarkable.    GI Tract/Mesentery: No evidence of bowel obstruction or inflammation.  Normal appearing appendix.    Peritoneal Space: No ascites. No free air.    Retroperitoneum: No significant adenopathy.    Abdominal wall: Unremarkable.    Vasculature: Atherosclerotic disease of the abdominal aorta with some loss of normal tapering but no evidence of aneurysm.    Bones: Degenerative changes of the left femoral head with small bone island noted.  Degenerative disease of the spine with no evidence of acute fracture or lytic lesions.                                                   ED Course as of Feb 06 2204   Thu Feb 06, 2020   0822 CT Renal Stone Study ABD Pelvis WO [NEDRA]      ED Course User Index  [NEDRA] Jose Henao MD        Pt's pain improved c tx in ED however still sig discomfort.  Pt continually requesting narcotic pain meds - I ? Drug seeking behavior.   We had a long discussion regarding diff dx including msk back pain, kidney stone, pyelo, etc.  PE has exact reproducable pain so I suspect msk back pain.  Pt is amenable to symptomatic tx at this time and will f/u c PCP in next 2-3 days for re-check.  Will return to ED for any worsening sx/s            Clinical Impression:       ICD-10-CM ICD-9-CM   1. Right flank pain R10.9 789.09   2. Lumbar strain, initial encounter S39.012A 847.2   3. Urinary tract infection without hematuria, site unspecified N39.0 599.0         Disposition:   Disposition: Discharged  Condition: Stable                     Jose Henao MD  02/06/20 2922

## 2020-02-07 ENCOUNTER — HOSPITAL ENCOUNTER (EMERGENCY)
Facility: HOSPITAL | Age: 65
Discharge: HOME | End: 2020-02-07
Attending: EMERGENCY MEDICINE
Payer: MEDICARE

## 2020-02-07 VITALS
HEIGHT: 67 IN | HEART RATE: 68 BPM | SYSTOLIC BLOOD PRESSURE: 143 MMHG | TEMPERATURE: 99 F | RESPIRATION RATE: 17 BRPM | BODY MASS INDEX: 26.68 KG/M2 | OXYGEN SATURATION: 98 % | WEIGHT: 170 LBS | DIASTOLIC BLOOD PRESSURE: 79 MMHG

## 2020-02-07 DIAGNOSIS — R10.9 RIGHT FLANK PAIN: ICD-10-CM

## 2020-02-07 DIAGNOSIS — R10.9 FLANK PAIN: ICD-10-CM

## 2020-02-07 DIAGNOSIS — N30.00 ACUTE CYSTITIS WITHOUT HEMATURIA: Primary | ICD-10-CM

## 2020-02-07 PROCEDURE — 99284 EMERGENCY DEPT VISIT MOD MDM: CPT | Mod: ,,, | Performed by: EMERGENCY MEDICINE

## 2020-02-07 PROCEDURE — 99284 PR EMERGENCY DEPT VISIT,LEVEL IV: ICD-10-PCS | Mod: ,,, | Performed by: EMERGENCY MEDICINE

## 2020-02-07 PROCEDURE — 99284 EMERGENCY DEPT VISIT MOD MDM: CPT

## 2020-02-07 RX ORDER — LIDOCAINE 50 MG/G
1 PATCH TOPICAL DAILY
Qty: 10 PATCH | Refills: 0 | Status: SHIPPED | OUTPATIENT
Start: 2020-02-07 | End: 2020-02-17

## 2020-02-07 RX ORDER — ONDANSETRON 4 MG/1
4 TABLET, FILM COATED ORAL EVERY 6 HOURS
Qty: 12 TABLET | Refills: 0 | Status: SHIPPED | OUTPATIENT
Start: 2020-02-07

## 2020-02-07 RX ORDER — CIPROFLOXACIN 500 MG/1
500 TABLET ORAL EVERY 12 HOURS
Qty: 20 TABLET | Refills: 0 | Status: SHIPPED | OUTPATIENT
Start: 2020-02-07 | End: 2020-02-17

## 2020-02-07 NOTE — ED TRIAGE NOTES
"Pt reports L sided flank pain x 4 days; pt expresses concern that known kidney stones are "moving down"; pt A&Ox4, respirations even, unlabored; pt ambulating with steady gait  "

## 2020-02-07 NOTE — ED PROVIDER NOTES
"Encounter Date: 2/7/2020    SCRIBE #1 NOTE: I, Ofelia Meredith, am scribing for, and in the presence of,  Dr. Cuauhtemoc Lyon. I have scribed the following portions of the note - Other sections scribed: HPI and ROS.       History     Chief Complaint   Patient presents with    Flank Pain     states kidney stones both kidneys--- pain meds not working     The patient was seen by the provider at 10:39 AM.    The patient is a 64 year old male with a past medical history of kidney stones, diabetes, and cirrhosis with complaints of flank pain, nausea, and vomiting. The patient reports the right sided constant flank pain began two days ago with associated nausea and vomiting. He states he visited the ED yesterday for his symptoms and was not diagnosed with a kidney stone. The patient reports he is having difficulty sleeping due to increased flank pain and has been taking the pain medications at the last ED visit as prescribed. He states he has a history of kidney stones and has attempted to treat the flank pain with topical pain medications. He denies fevers, chills, and muscle relaxer use.     The history is provided by the patient and medical records.     Review of patient's allergies indicates:   Allergen Reactions    Eggs [egg derived] Anaphylaxis    Mayonnaise Anaphylaxis    Milk containing products Anaphylaxis    Mustard Anaphylaxis     Past Medical History:   Diagnosis Date    Anxiety     Cirrhosis     Diabetes mellitus 2017    Diabetes mellitus, type 2     HCV (hepatitis C virus)     Heart damage     "hole in heart"    Kidney stone     s/p L ureteral stent placement on 5/29/2018. Infection cleared and he subsequently had a stent removal    Urinary tract infection      Past Surgical History:   Procedure Laterality Date    INTRACARDIAC ECHOCARDIOGRAPHY N/A 10/17/2019    Procedure: Intracardiac echocardiogram;  Surgeon: William Edmonds MD;  Location: [facility] CATH LAB;  Service: Cardiology;  Laterality: N/A;    " "KIDNEY STONE SURGERY      LEFT HEART CATHETERIZATION N/A 10/17/2019    Procedure: Left heart cath;  Surgeon: William Edmonds MD;  Location: [facility] CATH LAB;  Service: Cardiology;  Laterality: N/A;    NECK SURGERY      secondary to previous fall     Family History   Problem Relation Age of Onset    Hypertension Mother     Diabetes Mother     Stroke Mother     Coronary artery disease Father         CABG    Prostate cancer Neg Hx     Kidney disease Neg Hx     Colon cancer Neg Hx      Social History     Tobacco Use    Smoking status: Current Every Day Smoker     Packs/day: 0.50     Years: 40.00     Pack years: 20.00     Types: Cigarettes     Start date:      Last attempt to quit: 2019     Years since quittin.9    Smokeless tobacco: Never Used    Tobacco comment: states "very few"    Substance Use Topics    Alcohol use: No    Drug use: Not Currently     Types: Oxycodone     Review of Systems   Constitutional: Negative for chills and fever.   HENT: Negative for rhinorrhea and sore throat.    Eyes: Negative for visual disturbance.   Respiratory: Negative for shortness of breath.    Cardiovascular: Negative for chest pain.   Gastrointestinal: Positive for nausea and vomiting.   Genitourinary: Positive for flank pain. Negative for hematuria.   Musculoskeletal: Negative for back pain and neck pain.   Skin: Negative for rash.   Neurological: Negative for weakness and numbness.   Hematological: Does not bruise/bleed easily.       Physical Exam     Initial Vitals [20 1025]   BP Pulse Resp Temp SpO2   (!) 143/79 68 17 98.9 °F (37.2 °C) 98 %      MAP       --         Physical Exam    Nursing note and vitals reviewed.    Gen/Constitutional: Interactive. No acute distress  Head: Normocephalic, Atraumatic  Neck: supple, no masses or LAD  Eyes: PERRLA, conjunctiva clear  Ears, Nose and Throat: No rhinorrhea or stridor.  Cardiac: Reg Rhythm, No murmur  Pulmonary: CTA Bilat, no wheezes, rhonchi, " rales.  GI: Abdomen soft, non-tender, non-distended; no rebound or guarding  : No CVA tenderness.  Musculoskeletal: Extremities warm, well perfused, no erythema, no edema  Skin: No rashes  Neuro: Alert and Oriented x 3; No focal motor or sensory deficits.    Psych: Normal affect      ED Course   Procedures  Labs Reviewed - No data to display       Imaging Results    None          Medical Decision Making:   History:   Old Medical Records: I decided to obtain old medical records.  Old Records Summarized: records from clinic visits and records from previous admission(s).  Initial Assessment:   The patient is a 64 year old male with a past medical history of kidney stones, diabetes, and cirrhosis with complaints of flank pain, nausea, and vomiting.   Differential Diagnosis:   Differential diagnosis includes but is not limited to:  UTI, pyelonephritis, nephrolithiasis, ureterolithiasis, aortic pathology, muscle sprain, lumbar spine      Urgent evaluation of patient presenting with flank pain, nausea and vomiting.  He is afebrile, vital signs are stable. Physical exam findings with no significant abdominal peritoneal findings, no focal neurologic deficits, lung sounds clear, cardiac exam unremarkable. Patient was seen yesterday for similar findings.  He was diagnosed with right flank pain, lumbar strain and given medication and plans for outpatient follow-up.  He now continues and has not filled his prescriptions prior to being seen in the ED yesterday.  I reviewed his UA from yesterday which was sent for a culture in showed enterococcus faecalis greater than 100 1000 units.  I gave him a prescription for Cipro with outpatient follow-up and establishment with our Internal Medicine team for ambulatory referral.  I also instructed him to use Lidoderm patches for his lumbar pain, heating pads and other conservative measures for his lumbar strain.  The remainder is workup yesterday which include a CT scan and labs did not  show any other acute findings.  I reviewed his CT from yesterday, and did not see any aneurysmal disease of his aorta or iliac system.  On physical exam today bilateral equal pulses, no tachycardia, no hypertension, doubt acute aortic pathology.  I do not suspect acute ureterolithiasis, obstructive uropathy or acute pyelonephritis.  Patient discharged home with follow-up plan in place, antibiotics,lidoderm patch and Zofran as an antiemetic.  No high risk features to suggest acute infectious etiology, sepsis, pyelonephritis or acute pathology.  Of note, patient did make comments and have drug-seeking behavior by requesting various opiates by name. Patient agreeable to discharge plan. Strict ED precautions and return instructions discussed at length and patient verbalized understanding. All questions were answered and ample time was given for questions.      Complexity:  Moderate high          Scribe Attestation:   Scribe #1: I performed the above scribed service and the documentation accurately describes the services I performed. I attest to the accuracy of the note.    I, Dr. Cuauhtemoc Lyon, personally performed the services described in this documentation. All medical record entries made by the scribe were at my direction and in my presence.  I have reviewed the chart and agree that the record reflects my personal performance and is accurate and complete.                         Clinical Impression:       ICD-10-CM ICD-9-CM   1. Acute cystitis without hematuria N30.00 595.0   2. Flank pain R10.9 789.09   3. Right flank pain R10.9 789.09         Disposition:   Disposition: Discharged  Condition: Stable            Cuauhtemoc Lyon DO  Dept of Emergency Medicine   [facility]  Spectralink: [phone]           Cuauhtemoc Lyon DO  02/09/20 1221

## 2020-02-07 NOTE — DISCHARGE INSTRUCTIONS
Please follow-up with your primary care physician, and take medications as prescribed..    Our goal in the emergency department is to always give you outstanding care and exceptional service. You may receive a survey by mail or e-mail in the next week regarding your experience in our ED. We would greatly appreciate your completing and returning the survey. Your feedback provides us with a way to recognize our staff who give very good care and it helps us learn how to improve when your experience was below our aspiration of excellence.

## 2020-02-08 LAB — BACTERIA UR CULT: ABNORMAL

## 2020-02-14 NOTE — ED NOTES
"Presents to the ER with c/o uncontrolled blood sugar. Patient states, " I am supposed to take metformin twice a day, but I haven't taken it." Patient reports that while living in [place], he was eating properly and exercising, but since moving back to [place] he stopped. Blood sugar resulted in triage was 100. Associated complaints are weakness, not feeling well and "Falling asleep while walking." Patient denies any ETOH or drug use. Mucous membranes are pink and moist. Skin is warm, dry and intact. Lungs are clear bilaterally, respirations are regular and unlabored. Denies cough, congestion, rhinorrhea or SOB. BS active x4, no tenderness with palpation, abd is soft and not distended. Denies any appetite or activity change. S1S2, capillary refill is < 2 seconds. Denies dysuria, difficulty urinating, frequency, numbness, tingling or weakness. JOSÉ LUIS BLACKS    "
AMA paperwork has been signed by all parties. Patient ambulated to the lobby without difficulty with the instructions to return to the ED if symptoms persist or worsen. Patient verbalizes the understanding to return to have labs done tomorrow. Gait is steady. AAOx4.   
Patient has been updated on plan of care.   
Yes

## 2020-03-19 ENCOUNTER — NURSE TRIAGE (OUTPATIENT)
Dept: ADMINISTRATIVE | Facility: CLINIC | Age: 65
End: 2020-03-19

## 2020-03-19 ENCOUNTER — TELEPHONE (OUTPATIENT)
Dept: INTERNAL MEDICINE | Facility: CLINIC | Age: 65
End: 2020-03-19

## 2020-03-19 RX ORDER — PROMETHAZINE HYDROCHLORIDE AND CODEINE PHOSPHATE 6.25; 1 MG/5ML; MG/5ML
5 SOLUTION ORAL EVERY 4 HOURS PRN
OUTPATIENT
Start: 2020-03-19 | End: 2020-03-29

## 2020-03-19 NOTE — TELEPHONE ENCOUNTER
Called twice no answer, lvm let us know what pharmacy he is requesting also to call 911 if his symptoms worsen such as chest pain and sob

## 2020-03-19 NOTE — TELEPHONE ENCOUNTER
Attempted to call patient. No answer. Left a message.    Need to confirm sx, hx of COVID testing.  No COVID testing was done here and did not see on Care Everywhere.      Does meet criteria for COVID testing by sx, fever, lung dz/DM.  Trying to determine if he needs testing at the drive through vs. go to the ED for presumably worsened sx.

## 2020-03-19 NOTE — TELEPHONE ENCOUNTER
Pt states he was seen per UC yesterday (pt does not know UC name) and had temp over 103 and cough, HA, sore throat, nausea, SOB, and weakness. Pt was advised per UC to go home and isolate d/t possible coronavirus. Pt states he is requesting codeine cough syrup per PCP. Pt states he feels more confused than usual. Pt advised per protocol to call 911. Pt refuses. Pt states he was told he should not come to hospital unless temperature is over 104. Pt advised again to call 911 and pt refuses. Pt states he is following other provider's advice to stay home. Pt continues to request cough syrup. Message sent to PCP staff.     Reason for Disposition   Difficult to awaken or acting confused (e.g., disoriented, slurred speech)    Protocols used: FEVER-A-OH

## 2020-03-19 NOTE — TELEPHONE ENCOUNTER
----- Message from Madina Garcia sent at 3/19/2020 10:49 AM CDT -----  Contact: Self  Pt is calling to speak with Staff because he was recently diagnosed with Covid-19 and having problems.  He says he has a fever of 102 and was advised to call ER if his temperature goes up one degree.  Pt is requesting medication for his cough & was given two months worth prescriptions.  He is requesting a returned call, as soon as possible.  Pt says he health is not in the best condition & needs help.    He can be reached at [phone].    Thank you.

## 2020-04-17 ENCOUNTER — DOCUMENTATION ONLY (OUTPATIENT)
Dept: CARDIOLOGY | Facility: CLINIC | Age: 65
End: 2020-04-17

## 2020-04-20 ENCOUNTER — OFFICE VISIT (OUTPATIENT)
Dept: CARDIOLOGY | Facility: CLINIC | Age: 65
End: 2020-04-20
Payer: MEDICARE

## 2020-04-20 DIAGNOSIS — I63.9 CEREBROVASCULAR ACCIDENT (CVA), UNSPECIFIED MECHANISM: Primary | ICD-10-CM

## 2020-04-20 PROCEDURE — 99441 PR PHYSICIAN TELEPHONE EVALUATION 5-10 MIN: CPT | Mod: 95,,, | Performed by: INTERNAL MEDICINE

## 2020-04-20 PROCEDURE — 99441 PR PHYSICIAN TELEPHONE EVALUATION 5-10 MIN: ICD-10-PCS | Mod: 95,,, | Performed by: INTERNAL MEDICINE

## 2020-04-20 NOTE — PROGRESS NOTES
"  This is a telemedicine visit because of COVID 19 epidemic    PCP - Tierra Max MD   Referred by Dr. Yazan Galvan    Subjective:   Patient ID:  Josiah Richter is a 64 y.o. y.o. male who presents six months after PFO closure for multiple embolic strokes.  He has non-obstructive CAD.  He has no cardiac symptoms.    History:     Social History     Tobacco Use    Smoking status: Current Every Day Smoker     Packs/day: 0.50     Years: 40.00     Pack years: 20.00     Types: Cigarettes     Start date:      Last attempt to quit: 2019     Years since quittin.1    Smokeless tobacco: Never Used    Tobacco comment: states "very few"    Substance Use Topics    Alcohol use: No     Family History   Problem Relation Age of Onset    Hypertension Mother     Diabetes Mother     Stroke Mother     Coronary artery disease Father         CABG    Prostate cancer Neg Hx     Kidney disease Neg Hx     Colon cancer Neg Hx        Meds:     Review of patient's allergies indicates:   Allergen Reactions    Eggs [egg derived] Anaphylaxis    Mayonnaise Anaphylaxis    Milk containing products Anaphylaxis    Mustard Anaphylaxis       Current Outpatient Medications:     albuterol (PROVENTIL/VENTOLIN HFA) 90 mcg/actuation inhaler, Inhale 1-2 puffs into the lungs every 6 (six) hours as needed for Wheezing. Rescue, Disp: 1 Inhaler, Rfl: 0    atorvastatin (LIPITOR) 20 MG tablet, Take 20 mg by mouth., Disp: , Rfl:     clopidogrel (PLAVIX) 75 mg tablet, Take 1 tablet (75 mg total) by mouth once daily., Disp: 14 tablet, Rfl: 0    gabapentin (NEURONTIN) 300 MG capsule, TK 1 C PO TID PRN, Disp: , Rfl: 0    HYDROcodone-acetaminophen (NORCO) 7.5-325 mg per tablet, Take 1 tablet by mouth every 6 (six) hours as needed for Pain. (Patient not taking: Reported on 2020), Disp: 10 tablet, Rfl: 0    ibuprofen (ADVIL,MOTRIN) 800 MG tablet, Take 1 tablet (800 mg total) by mouth every 6 (six) hours as needed for Pain. (Patient " not taking: Reported on 4/17/2020), Disp: 20 tablet, Rfl: 0    ipratropium (ATROVENT) 42 mcg (0.06 %) nasal spray, 2 sprays by Nasal route 4 (four) times daily. (Patient not taking: Reported on 10/14/2019), Disp: 15 mL, Rfl: 3    ketorolac (TORADOL) 10 mg tablet, Take 1 tablet (10 mg total) by mouth every 6 (six) hours. (Patient not taking: Reported on 4/17/2020), Disp: 15 tablet, Rfl: 0    levETIRAcetam (KEPPRA) 500 MG Tab, Take by mouth. , Disp: , Rfl:     ondansetron (ZOFRAN) 4 MG tablet, Take 1 tablet (4 mg total) by mouth every 6 (six) hours. (Patient not taking: Reported on 4/17/2020), Disp: 12 tablet, Rfl: 0    SUBOXONE 8-2 mg Film, DISSOLVE 1 FILM UNT TID, Disp: , Rfl: 0      Review of Systems   Constitutional: Negative for chills, fever, malaise/fatigue and weight loss.   Respiratory: Negative for cough, sputum production and shortness of breath.    Cardiovascular: Negative for chest pain, palpitations, orthopnea, claudication, leg swelling and PND.   Gastrointestinal: Negative for abdominal pain, diarrhea, nausea and vomiting.   Genitourinary: Negative for dysuria, hematuria and urgency.   Neurological: Positive for sensory change, focal weakness, seizures and weakness.       Objective:   There were no vitals taken for this visit.  Deffered as video visit    Labs:     Lab Results   Component Value Date     02/06/2020    K 4.3 02/06/2020     02/06/2020    CO2 25 02/06/2020    BUN 16 02/06/2020    CREATININE 1.2 02/06/2020    ANIONGAP 9 02/06/2020     Lab Results   Component Value Date    HGBA1C 5.1 06/07/2019     Lab Results   Component Value Date    BNP <10 09/11/2019    BNP 34 02/03/2019       Lab Results   Component Value Date    WBC 9.43 02/06/2020    HGB 15.1 02/06/2020    HCT 46.5 02/06/2020     (L) 02/06/2020    GRAN 6.3 02/06/2020    GRAN 66.8 02/06/2020     Lab Results   Component Value Date    CHOL 119 (L) 03/13/2015    HDL 28 (L) 03/13/2015    LDLCALC 71.4 03/13/2015     TRIG 98 03/13/2015       Lab Results   Component Value Date     02/06/2020    K 4.3 02/06/2020     02/06/2020    CO2 25 02/06/2020    BUN 16 02/06/2020    CREATININE 1.2 02/06/2020    ANIONGAP 9 02/06/2020     Lab Results   Component Value Date    HGBA1C 5.1 06/07/2019     Lab Results   Component Value Date    BNP <10 09/11/2019    BNP 34 02/03/2019    Lab Results   Component Value Date    WBC 9.43 02/06/2020    HGB 15.1 02/06/2020    HCT 46.5 02/06/2020     (L) 02/06/2020    GRAN 6.3 02/06/2020    GRAN 66.8 02/06/2020     Lab Results   Component Value Date    CHOL 119 (L) 03/13/2015    HDL 28 (L) 03/13/2015    LDLCALC 71.4 03/13/2015    TRIG 98 03/13/2015          Cardiovascular Imaging:     TTE with bubble study pending    Assessment & Plan:     Patient Active Problem List   Diagnosis    Type 2 diabetes mellitus, without long-term current use of insulin    COPD (chronic obstructive pulmonary disease)    Heavy smoker (more than 20 cigarettes per day)    Pneumonia    Chronic hepatitis C    Moderate malnutrition    Opiate dependence    Macrocytic anemia    Other insomnia    Chronic back pain    Psychogenic nonepileptic seizure    JOLEEN (acute kidney injury)    Transaminitis    Polysubstance abuse    Stroke    Chest pain    PFO (patent foramen ovale): Successfully closed six months ago.       Plan:    TTE with bubble study in 1-2months.  D/C plavix.  F/u by phone.  F/u with Dr. Galvan.

## 2020-04-23 ENCOUNTER — TELEPHONE (OUTPATIENT)
Dept: PHYSICAL MEDICINE AND REHAB | Facility: CLINIC | Age: 65
End: 2020-04-23

## 2020-04-23 NOTE — TELEPHONE ENCOUNTER
"Called and spoke with patient daughter Nadeen.  Patient lost his phone and the number I called was his daughters" number.  Nadeen will give her Dad the message in regards to his appointment.  Patient to decide if he wants to reschedule or have Virtual Visit.  "

## 2020-05-04 RX ORDER — GABAPENTIN 300 MG/1
CAPSULE ORAL
Qty: 90 CAPSULE | Refills: 2 | Status: SHIPPED | OUTPATIENT
Start: 2020-05-04

## 2020-05-04 NOTE — TELEPHONE ENCOUNTER
----- Message from Hailey Casanova, Patient Care Assistant sent at 5/4/2020 10:10 AM CDT -----  Contact: IRWIN HASKINS [[id number]]  Can the clinic reply in [facility]: No    Please refill the medication(s) listed below. The patient can be reached at this phone number once it is called into the pharmacy.[phone]    Medication #1gabapentin (NEURONTIN) 300 MG capsule    Medication #2    Preferred Pharmacy:[facility] [address] [facility] [phone]

## 2020-05-04 NOTE — TELEPHONE ENCOUNTER
Contacted patient and informed medication sent to pharmacy. Scheduled appointment and placed appointment reminder in [facility].

## 2020-05-04 NOTE — TELEPHONE ENCOUNTER
----- Message from Hailey Casanova, Patient Care Assistant sent at 5/4/2020 10:10 AM CDT -----  Contact: IRWIN HASKINS [[id number]]  Can the clinic reply in [facility]: No    Please refill the medication(s) listed below. The patient can be reached at this phone number once it is called into the pharmacy.[phone]    Medication #1gabapentin (NEURONTIN) 300 MG capsule    Medication #2    Preferred Pharmacy:[facility] [address] [facility] [phone]

## 2020-05-06 RX ORDER — ALBUTEROL SULFATE 90 UG/1
AEROSOL, METERED RESPIRATORY (INHALATION)
Qty: 18 G | Refills: 0 | Status: SHIPPED | OUTPATIENT
Start: 2020-05-06

## 2020-05-28 DIAGNOSIS — Z12.11 COLON CANCER SCREENING: ICD-10-CM

## 2020-06-18 ENCOUNTER — HOSPITAL ENCOUNTER (EMERGENCY)
Facility: HOSPITAL | Age: 65
Discharge: LEFT BEFORE DISCHARGE | End: 2020-06-18
Attending: EMERGENCY MEDICINE
Payer: MEDICARE

## 2020-06-18 VITALS
WEIGHT: 170 LBS | OXYGEN SATURATION: 97 % | RESPIRATION RATE: 16 BRPM | DIASTOLIC BLOOD PRESSURE: 59 MMHG | HEART RATE: 70 BPM | TEMPERATURE: 98 F | BODY MASS INDEX: 26.68 KG/M2 | HEIGHT: 67 IN | SYSTOLIC BLOOD PRESSURE: 107 MMHG

## 2020-06-18 DIAGNOSIS — R51.9 ACUTE NONINTRACTABLE HEADACHE, UNSPECIFIED HEADACHE TYPE: Primary | ICD-10-CM

## 2020-06-18 DIAGNOSIS — Z84.89 FAMILY HISTORY OF HEADACHES: ICD-10-CM

## 2020-06-18 DIAGNOSIS — F17.200 REFUSED SMOKING CESSATION EDUCATIONAL MATERIALS: ICD-10-CM

## 2020-06-18 DIAGNOSIS — Z87.891 HISTORY OF SMOKING: ICD-10-CM

## 2020-06-18 PROCEDURE — 96374 THER/PROPH/DIAG INJ IV PUSH: CPT

## 2020-06-18 PROCEDURE — 99284 EMERGENCY DEPT VISIT MOD MDM: CPT | Mod: 25

## 2020-06-18 PROCEDURE — 63600175 PHARM REV CODE 636 W HCPCS: Performed by: EMERGENCY MEDICINE

## 2020-06-18 PROCEDURE — 25000003 PHARM REV CODE 250: Performed by: EMERGENCY MEDICINE

## 2020-06-18 PROCEDURE — 99284 PR EMERGENCY DEPT VISIT,LEVEL IV: ICD-10-PCS | Mod: ,,, | Performed by: EMERGENCY MEDICINE

## 2020-06-18 PROCEDURE — 96375 TX/PRO/DX INJ NEW DRUG ADDON: CPT

## 2020-06-18 PROCEDURE — 96361 HYDRATE IV INFUSION ADD-ON: CPT

## 2020-06-18 PROCEDURE — 99284 EMERGENCY DEPT VISIT MOD MDM: CPT | Mod: ,,, | Performed by: EMERGENCY MEDICINE

## 2020-06-18 RX ORDER — BUTALBITAL, ACETAMINOPHEN AND CAFFEINE 50; 325; 40 MG/1; MG/1; MG/1
2 TABLET ORAL
Status: COMPLETED | OUTPATIENT
Start: 2020-06-18 | End: 2020-06-18

## 2020-06-18 RX ORDER — ONDANSETRON 2 MG/ML
4 INJECTION INTRAMUSCULAR; INTRAVENOUS
Status: COMPLETED | OUTPATIENT
Start: 2020-06-18 | End: 2020-06-18

## 2020-06-18 RX ORDER — KETOROLAC TROMETHAMINE 30 MG/ML
10 INJECTION, SOLUTION INTRAMUSCULAR; INTRAVENOUS
Status: COMPLETED | OUTPATIENT
Start: 2020-06-18 | End: 2020-06-18

## 2020-06-18 RX ORDER — METOCLOPRAMIDE HYDROCHLORIDE 5 MG/ML
10 INJECTION INTRAMUSCULAR; INTRAVENOUS
Status: COMPLETED | OUTPATIENT
Start: 2020-06-18 | End: 2020-06-18

## 2020-06-18 RX ADMIN — KETOROLAC TROMETHAMINE 10 MG: 30 INJECTION, SOLUTION INTRAMUSCULAR at 10:06

## 2020-06-18 RX ADMIN — METOCLOPRAMIDE 10 MG: 5 INJECTION, SOLUTION INTRAMUSCULAR; INTRAVENOUS at 10:06

## 2020-06-18 RX ADMIN — SODIUM CHLORIDE, SODIUM LACTATE, POTASSIUM CHLORIDE, AND CALCIUM CHLORIDE 1000 ML: .6; .31; .03; .02 INJECTION, SOLUTION INTRAVENOUS at 10:06

## 2020-06-18 RX ADMIN — BUTALBITAL, ACETAMINOPHEN, AND CAFFEINE 2 TABLET: 50; 325; 40 TABLET ORAL at 09:06

## 2020-06-18 RX ADMIN — ONDANSETRON HYDROCHLORIDE 4 MG: 2 SOLUTION INTRAMUSCULAR; INTRAVENOUS at 10:06

## 2020-06-18 NOTE — ED TRIAGE NOTES
Pt with migraine x 3 days, states he has been given gabapentin in the past for the same.  Pt states pain whole head. 10/10 headache

## 2020-06-18 NOTE — ED PROVIDER NOTES
"Encounter Date: 6/18/2020       History     Chief Complaint   Patient presents with    Migraine     Pt with migraine x 3 days, states he has been given gabapentin in the past for the same     HPI   63 Y/O M smoker C/O of non-radiating, band-like HA with no associated visual changes, dizziness, N/V, photophobia, phonophobia, and reports no neck pain/stiffness, no fever or chills and no recent travel. No reported alleviating or aggravating factors. He reports HA was gradual in onset and similar to multiple HAs he's had in the past. No reported Hx of DVT/PE or other complaints.  He does report taking gabapentin today p.r.n., which he does not take on a daily basis.  He denies taking any other analgesics.  Otherwise no recent illness, sore throat, runny nose, any other upper respiratory complaints, no urinary complaints or GI complaints.    Review of patient's allergies indicates:   Allergen Reactions    Eggs [egg derived] Anaphylaxis    Mayonnaise Anaphylaxis    Milk containing products Anaphylaxis    Mustard Anaphylaxis     Past Medical History:   Diagnosis Date    Anxiety     Cirrhosis     Diabetes mellitus 2017    Diabetes mellitus, type 2     HCV (hepatitis C virus)     Heart damage     "hole in heart"    Kidney stone     s/p L ureteral stent placement on 5/29/2018. Infection cleared and he subsequently had a stent removal    Urinary tract infection      Past Surgical History:   Procedure Laterality Date    INTRACARDIAC ECHOCARDIOGRAPHY N/A 10/17/2019    Procedure: Intracardiac echocardiogram;  Surgeon: William Edmonds MD;  Location: [facility] CATH LAB;  Service: Cardiology;  Laterality: N/A;    KIDNEY STONE SURGERY      LEFT HEART CATHETERIZATION N/A 10/17/2019    Procedure: Left heart cath;  Surgeon: William Edmonds MD;  Location: [facility] CATH LAB;  Service: Cardiology;  Laterality: N/A;    NECK SURGERY      secondary to previous fall     Family History   Problem Relation Age of Onset    " "Hypertension Mother     Diabetes Mother     Stroke Mother     Coronary artery disease Father         CABG    Prostate cancer Neg Hx     Kidney disease Neg Hx     Colon cancer Neg Hx      Social History     Tobacco Use    Smoking status: Current Every Day Smoker     Packs/day: 0.50     Years: 40.00     Pack years: 20.00     Types: Cigarettes     Start date:      Last attempt to quit: 2019     Years since quittin.3    Smokeless tobacco: Never Used    Tobacco comment: states "very few"    Substance Use Topics    Alcohol use: No    Drug use: Not Currently     Types: Oxycodone     Review of Systems  CONST: No fever, chills, weight change, or fatigue.  HEENT: + headache, but no blurry vision/change in vision, sore throat, ear pain, eye pain,  otorrhea, rhinorrhea, tooth pain, swelling, or voice changes.  NECK: No pain, masses, trauma, or redness.  HEART: No pain, palpitations, diaphoresis, nausea, or vomiting  LUNG: No SOB, cough, orthopnea, BARBA or other complaints.  ABDOMEN: No pain, nausea, vomiting, diarrhea, constipation, or flank pain; No black or  bloody stools.  : No discharge, dysuria, lesions, rashes, masses, sores, or hematuria  EXTREMITIES: FROM with No swelling, redness, injuries/trauma, lesions, sores,  weakness, numbness, or tingling  NEURO: No dizziness, weakness, fatigue, tremors, headache, change in vision or  disturbances of balance or coordination  SKIN: No lesions, rashes, trauma or other complaints.    Physical Exam     Initial Vitals [20 0928]   BP Pulse Resp Temp SpO2   (!) 141/75 88 16 98 °F (36.7 °C) 96 %      MAP       --         Physical Exam  GENERAL: Well-appearing and Non-Toxic; Well-Nourished; NAD.  HEENT: AT/NC; PERRL, EOMI, Acuity & Fields WNL; TA region with no TTP, no nodularity or pulse asymmetry to TA; speaking full sentences with no drooling.  NECK: Supple, FROM with no meningismus, no accessory muscle use. No carotid bruits B/L.  HEART: Regular rate " and rhythm, no M/G/T.   LUNGS: No Tachypnea, No Retractions, and CTA B/L with no W/R/R.  ABDOMEN: +BS, Soft, ND, NTTP. No rigidity. No guarding. NEG Pierre's, Rovsing's, Psoas', Obturator's and McBurney's Signs.  BACK: Atraumatic, No midline TTP to C/T/LS spine; No CVA tenderness B/L. SLRT NEG.  EXTREMITIES: FROM. Strength 5/5. Symmetrical Sensorium and with no deficits. Soft Comparments.  SKIN: Warm, Dry, No Skin Tears or Rashes.  VASCULAR: 2+ pulses Prox/Dist &amp; Symm with no delay.  NEUROLOGIC: AAOx3, No Receptive or Expressive Aphasia, Answering Questions Appropriately, Recent & Remote Memory Intact, No Visual or Tactile Agnosia to B/L UE; CN/PN Intact, Strength 5/5, Sens Symmetrical to UE & LE, No Ataxia, NEG Romberg's and WNL FTN & HTS. Intact Marching in place.    ED Course   Procedures  Labs Reviewed - No data to display       Imaging Results    None          Medical Decision Making:   History:   Old Medical Records: I decided to obtain old medical records.  Initial Assessment:   Well appearing, afebrile, hemodynamically stable and neurovascularly intact male presents with typical headache with no red flags warranting emergent CT, LP, MRI at this time.  Will provide analgesia and close reassessed.  ____________________  Paulo Cabrera MD, [facility]  Emergency Medicine Staff  9:36 AM 6/18/2020    RN reported patient refused further care and requested IV removal.  I went to re-evaluate and he requested no additional medications besides Demerol.  I attempted to provide alternate analgesia, which he refused.  He abandoned our care prior to disposition.  ____________________  Paulo Cabrera MD, [facility]  Emergency Medicine Staff  11:31 AM 6/18/2020                                 Clinical Impression:       ICD-10-CM ICD-9-CM   1. Acute nonintractable headache, unspecified headache type  R51 784.0   2. Family history of headaches  Z84.89 V19.8   3. History of smoking  Z87.891 V15.82   4. Refused smoking cessation  educational materials  Z87.891 V15.82           Disposition:   Disposition: Eloped  Condition: Stable     ED Disposition Condition    Skyoped                           Tej Cabrera MD  06/18/20 1132

## 2020-07-08 ENCOUNTER — HOSPITAL ENCOUNTER (EMERGENCY)
Facility: HOSPITAL | Age: 65
Discharge: HOME | End: 2020-07-08
Attending: EMERGENCY MEDICINE
Payer: MEDICARE

## 2020-07-08 ENCOUNTER — TELEPHONE (OUTPATIENT)
Dept: DERMATOLOGY | Facility: CLINIC | Age: 65
End: 2020-07-08

## 2020-07-08 VITALS
DIASTOLIC BLOOD PRESSURE: 72 MMHG | WEIGHT: 160 LBS | RESPIRATION RATE: 18 BRPM | TEMPERATURE: 98 F | HEIGHT: 66 IN | SYSTOLIC BLOOD PRESSURE: 132 MMHG | OXYGEN SATURATION: 97 % | BODY MASS INDEX: 25.71 KG/M2 | HEART RATE: 67 BPM

## 2020-07-08 DIAGNOSIS — D17.1 LIPOMA OF BACK: Primary | ICD-10-CM

## 2020-07-08 PROCEDURE — 99282 EMERGENCY DEPT VISIT SF MDM: CPT | Mod: ,,, | Performed by: EMERGENCY MEDICINE

## 2020-07-08 PROCEDURE — 99282 EMERGENCY DEPT VISIT SF MDM: CPT

## 2020-07-08 PROCEDURE — 99282 PR EMERGENCY DEPT VISIT,LEVEL II: ICD-10-PCS | Mod: ,,, | Performed by: EMERGENCY MEDICINE

## 2020-07-08 RX ORDER — ALPRAZOLAM 2 MG/1
2 TABLET ORAL
COMMUNITY
Start: 2020-05-13

## 2020-07-08 NOTE — TELEPHONE ENCOUNTER
Scheduled pt an appointment to see AR[facility] 08/24/20.     TB      ----- Message from Nathalie Merino sent at 7/8/2020 12:44 PM CDT -----  Regarding: pt  Derm - pt Patient Requesting Sooner Appointment.     Reason for sooner appt.: pt is calling to speak with the nurse pt was seen today in the er for a rash pt was told to follow up in a few days   When is the first available appointment? 8/2020  Communication Preference: can you please call pt at [phone]  Additional Information: none    NEDRA

## 2020-07-08 NOTE — ED TRIAGE NOTES
Patient states he has a mass on his right shoulder off and on for 10 years. He states it is getting bigger and doesn't look right. Mass is not painful. Patient has bruising to both arms and wants to know what it is. Denies n/v/d. Denies chest pain and shortness of breath.

## 2020-07-08 NOTE — ED PROVIDER NOTES
"Encounter Date: 7/8/2020    SCRIBE #1 NOTE: I, Josiah Cook, am scribing for, and in the presence of,  Noah Richards. I have scribed the following portions of the note - Other sections scribed: JOJO PE.       History     Chief Complaint   Patient presents with    Rash     both arm and knot to r upper shoulder     63 yo M with pmhx HCV cirrhosis, DM, anxiety, nephrolithiasis presents with a chief complaint of mass.  Patient reports a knot to the right upper shoulder.  It has been present for the past 10 years.  It has been waxing and waning in severity.  He believes that over the past 6 months, it seems to be worsening in size.  No trauma or pain.  Symptoms are not necessarily acutely worsened today but it has been going on too long so he presents.  No fevers.  No IVDA.  Patient also reported some rashes to bilateral upper extremities at triage.  Patient clarifies this has been chronic bruising secondary to old age." He is not concerned about the bruising.        Review of patient's allergies indicates:   Allergen Reactions    Eggs [egg derived] Anaphylaxis    Mayonnaise Anaphylaxis    Milk containing products Anaphylaxis    Mustard Anaphylaxis     Past Medical History:   Diagnosis Date    Anxiety     Cirrhosis     Diabetes mellitus 2017    Diabetes mellitus, type 2     HCV (hepatitis C virus)     Heart damage     "hole in heart"    Kidney stone     s/p L ureteral stent placement on 5/29/2018. Infection cleared and he subsequently had a stent removal    Urinary tract infection      Past Surgical History:   Procedure Laterality Date    INTRACARDIAC ECHOCARDIOGRAPHY N/A 10/17/2019    Procedure: Intracardiac echocardiogram;  Surgeon: William Edmonds MD;  Location: [facility] CATH LAB;  Service: Cardiology;  Laterality: N/A;    KIDNEY STONE SURGERY      LEFT HEART CATHETERIZATION N/A 10/17/2019    Procedure: Left heart cath;  Surgeon: William Edmonds MD;  Location: [facility] CATH LAB;  Service: Cardiology;  " "Laterality: N/A;    NECK SURGERY      secondary to previous fall     Family History   Problem Relation Age of Onset    Hypertension Mother     Diabetes Mother     Stroke Mother     Coronary artery disease Father         CABG    Prostate cancer Neg Hx     Kidney disease Neg Hx     Colon cancer Neg Hx      Social History     Tobacco Use    Smoking status: Current Every Day Smoker     Packs/day: 0.50     Years: 40.00     Pack years: 20.00     Types: Cigarettes     Start date:      Last attempt to quit: 2019     Years since quittin.3    Smokeless tobacco: Never Used    Tobacco comment: states "very few"    Substance Use Topics    Alcohol use: No    Drug use: Not Currently     Types: Oxycodone     Review of Systems   Constitutional: Negative for fever.   Skin: Negative for wound.        Mass superior to right scapula       Physical Exam     Initial Vitals [20 1206]   BP Pulse Resp Temp SpO2   132/72 67 18 97.7 °F (36.5 °C) 97 %      MAP       --         Physical Exam    Nursing note and vitals reviewed.  Constitutional: He appears well-developed and well-nourished. He is not diaphoretic. No distress.   HENT:   Head: Normocephalic.   Neck: Neck supple.   Cardiovascular: Normal rate.   Pulmonary/Chest: No respiratory distress.   Musculoskeletal:      Comments: Full active range of motion of right shoulder.   Neurological: He is alert.   Skin: Skin is warm.   5 cm firm, mobile mass superior to the right scapula.  No erythema, warmth, tenderness, or fluctuance.  No bruit auscultated over mass  Some bruising to bilateral arms without any tenderness         ED Course   Procedures  Labs Reviewed - No data to display       Imaging Results    None          Medical Decision Making:   History:   Old Medical Records: I decided to obtain old medical records.  Initial Assessment:   65 yo M with pmhx HCV cirrhosis, DM, anxiety, nephrolithiasis presents with a chief complaint of mass.   Differential " Diagnosis:   Lipoma, abscess, mass  ED Management:  No skin changes, tenderness, warmth to suggest abscess.  No trauma to suggest fracture or hematoma.  Overall consistent with a lipoma.  Patient instructed to follow-up with dermatology for definitive management.  Ambulatory referral provided.  Return precautions.            Scribe Attestation:   Scribe #1: I performed the above scribed service and the documentation accurately describes the services I performed. I attest to the accuracy of the note.    Attending Attestation:           Physician Attestation for Scribe:      Comments: I, Dr. Noah Richards, personally performed the services described in this documentation. All medical record entries made by the scribe were at my direction and in my presence.  I have reviewed the chart and agree that the record reflects my personal performance and is accurate and complete. Noah Richards MD.  12:39 PM 07/08/2020                            Clinical Impression:       ICD-10-CM ICD-9-CM   1. Lipoma of back  D17.1 214.8             ED Disposition Condition    Discharge Stable        ED Prescriptions     None        Follow-up Information     Follow up With Specialties Details Why Contact Info Additional Information    [facility] Dermatology Schedule an appointment as soon as possible for a visit   [id number]  [facility] [id number] [phone] [facility] [place], [address] - [address]                                      Noah Richards MD  07/08/20 4260

## 2020-07-09 PROBLEM — J18.9 PNEUMONIA: Status: RESOLVED | Noted: 2019-02-25 | Resolved: 2020-07-09

## 2020-07-09 PROBLEM — I25.10 CAD (CORONARY ARTERY DISEASE): Status: ACTIVE | Noted: 2020-07-09

## 2020-07-09 PROBLEM — N17.9 AKI (ACUTE KIDNEY INJURY): Status: RESOLVED | Noted: 2019-06-07 | Resolved: 2020-07-09

## 2020-07-09 PROBLEM — N20.0 NEPHROLITHIASIS: Status: ACTIVE | Noted: 2020-07-09

## 2020-07-09 PROBLEM — E78.2 MIXED HYPERLIPIDEMIA: Status: ACTIVE | Noted: 2020-07-09

## 2020-07-09 PROBLEM — R74.01 TRANSAMINITIS: Status: RESOLVED | Noted: 2019-06-07 | Resolved: 2020-07-09

## 2020-07-09 PROBLEM — Z86.73 HISTORY OF STROKE: Status: ACTIVE | Noted: 2019-10-14

## 2020-07-23 ENCOUNTER — PATIENT OUTREACH (OUTPATIENT)
Dept: EMERGENCY MEDICINE | Facility: HOSPITAL | Age: 65
End: 2020-07-23

## 2020-08-27 ENCOUNTER — HOSPITAL ENCOUNTER (EMERGENCY)
Facility: HOSPITAL | Age: 65
Discharge: HOME | End: 2020-08-28
Attending: EMERGENCY MEDICINE
Payer: MEDICARE

## 2020-08-27 DIAGNOSIS — M25.559 HIP PAIN: ICD-10-CM

## 2020-08-27 DIAGNOSIS — M54.2 NECK PAIN: ICD-10-CM

## 2020-08-27 PROCEDURE — 96375 TX/PRO/DX INJ NEW DRUG ADDON: CPT

## 2020-08-27 PROCEDURE — 99284 EMERGENCY DEPT VISIT MOD MDM: CPT | Mod: ,,, | Performed by: EMERGENCY MEDICINE

## 2020-08-27 PROCEDURE — 99285 EMERGENCY DEPT VISIT HI MDM: CPT | Mod: 25

## 2020-08-27 PROCEDURE — 99284 PR EMERGENCY DEPT VISIT,LEVEL IV: ICD-10-PCS | Mod: ,,, | Performed by: EMERGENCY MEDICINE

## 2020-08-27 PROCEDURE — 96361 HYDRATE IV INFUSION ADD-ON: CPT

## 2020-08-27 PROCEDURE — 96365 THER/PROPH/DIAG IV INF INIT: CPT

## 2020-08-28 VITALS
DIASTOLIC BLOOD PRESSURE: 83 MMHG | RESPIRATION RATE: 22 BRPM | HEIGHT: 66 IN | HEART RATE: 90 BPM | WEIGHT: 165 LBS | TEMPERATURE: 99 F | BODY MASS INDEX: 26.52 KG/M2 | SYSTOLIC BLOOD PRESSURE: 134 MMHG | OXYGEN SATURATION: 98 %

## 2020-08-28 LAB
ALBUMIN SERPL BCP-MCNC: 3 G/DL (ref 3.5–5.2)
ALP SERPL-CCNC: 64 U/L (ref 55–135)
ALT SERPL W/O P-5'-P-CCNC: 31 U/L (ref 10–44)
AMPHET+METHAMPHET UR QL: NORMAL
ANION GAP SERPL CALC-SCNC: 10 MMOL/L (ref 8–16)
AST SERPL-CCNC: 38 U/L (ref 10–40)
BACTERIA #/AREA URNS AUTO: NORMAL /HPF
BARBITURATES UR QL SCN>200 NG/ML: NEGATIVE
BASOPHILS # BLD AUTO: 0.02 K/UL (ref 0–0.2)
BASOPHILS NFR BLD: 0.5 % (ref 0–1.9)
BENZODIAZ UR QL SCN>200 NG/ML: NEGATIVE
BILIRUB SERPL-MCNC: 0.6 MG/DL (ref 0.1–1)
BILIRUB UR QL STRIP: NEGATIVE
BUN SERPL-MCNC: 16 MG/DL (ref 8–23)
BZE UR QL SCN: NEGATIVE
CALCIUM SERPL-MCNC: 7.8 MG/DL (ref 8.7–10.5)
CANNABINOIDS UR QL SCN: NEGATIVE
CHLORIDE SERPL-SCNC: 116 MMOL/L (ref 95–110)
CK SERPL-CCNC: 111 U/L (ref 20–200)
CLARITY UR REFRACT.AUTO: CLEAR
CO2 SERPL-SCNC: 16 MMOL/L (ref 23–29)
COLOR UR AUTO: YELLOW
CREAT SERPL-MCNC: 1.1 MG/DL (ref 0.5–1.4)
CREAT UR-MCNC: 80 MG/DL (ref 23–375)
DIFFERENTIAL METHOD: ABNORMAL
EOSINOPHIL # BLD AUTO: 0 K/UL (ref 0–0.5)
EOSINOPHIL NFR BLD: 1 % (ref 0–8)
ERYTHROCYTE [DISTWIDTH] IN BLOOD BY AUTOMATED COUNT: 14.1 % (ref 11.5–14.5)
EST. GFR  (AFRICAN AMERICAN): >60 ML/MIN/1.73 M^2
EST. GFR  (NON AFRICAN AMERICAN): >60 ML/MIN/1.73 M^2
ETHANOL SERPL-MCNC: <10 MG/DL
ETHANOL UR-MCNC: <10 MG/DL
GLUCOSE SERPL-MCNC: 86 MG/DL (ref 70–110)
GLUCOSE UR QL STRIP: NEGATIVE
HCT VFR BLD AUTO: 39.2 % (ref 40–54)
HGB BLD-MCNC: 13.2 G/DL (ref 14–18)
HGB UR QL STRIP: ABNORMAL
IMM GRANULOCYTES # BLD AUTO: 0.01 K/UL (ref 0–0.04)
IMM GRANULOCYTES NFR BLD AUTO: 0.2 % (ref 0–0.5)
KETONES UR QL STRIP: ABNORMAL
LACTATE SERPL-SCNC: 1.6 MMOL/L (ref 0.5–2.2)
LEUKOCYTE ESTERASE UR QL STRIP: NEGATIVE
LYMPHOCYTES # BLD AUTO: 1.3 K/UL (ref 1–4.8)
LYMPHOCYTES NFR BLD: 31.6 % (ref 18–48)
MCH RBC QN AUTO: 32.8 PG (ref 27–31)
MCHC RBC AUTO-ENTMCNC: 33.7 G/DL (ref 32–36)
MCV RBC AUTO: 98 FL (ref 82–98)
METHADONE UR QL SCN>300 NG/ML: NEGATIVE
MICROSCOPIC COMMENT: NORMAL
MONOCYTES # BLD AUTO: 0.3 K/UL (ref 0.3–1)
MONOCYTES NFR BLD: 8.5 % (ref 4–15)
NEUTROPHILS # BLD AUTO: 2.3 K/UL (ref 1.8–7.7)
NEUTROPHILS NFR BLD: 58.2 % (ref 38–73)
NITRITE UR QL STRIP: NEGATIVE
NRBC BLD-RTO: 0 /100 WBC
OPIATES UR QL SCN: NORMAL
PCP UR QL SCN>25 NG/ML: NEGATIVE
PH UR STRIP: 6 [PH] (ref 5–8)
PLATELET # BLD AUTO: 79 K/UL (ref 150–350)
PMV BLD AUTO: 12.3 FL (ref 9.2–12.9)
POTASSIUM SERPL-SCNC: 3.6 MMOL/L (ref 3.5–5.1)
PROT SERPL-MCNC: 6.7 G/DL (ref 6–8.4)
PROT UR QL STRIP: NEGATIVE
RBC # BLD AUTO: 4.02 M/UL (ref 4.6–6.2)
RBC #/AREA URNS AUTO: 1 /HPF (ref 0–4)
SARS-COV-2 RDRP RESP QL NAA+PROBE: NEGATIVE
SODIUM SERPL-SCNC: 142 MMOL/L (ref 136–145)
SP GR UR STRIP: 1.01 (ref 1–1.03)
SQUAMOUS #/AREA URNS AUTO: 0 /HPF
TOXICOLOGY INFORMATION: NORMAL
TSH SERPL DL<=0.005 MIU/L-ACNC: 0.77 UIU/ML (ref 0.4–4)
URN SPEC COLLECT METH UR: ABNORMAL
WBC # BLD AUTO: 4.02 K/UL (ref 3.9–12.7)
WBC #/AREA URNS AUTO: 1 /HPF (ref 0–5)

## 2020-08-28 PROCEDURE — 63600175 PHARM REV CODE 636 W HCPCS: Performed by: EMERGENCY MEDICINE

## 2020-08-28 PROCEDURE — 84443 ASSAY THYROID STIM HORMONE: CPT

## 2020-08-28 PROCEDURE — 80053 COMPREHEN METABOLIC PANEL: CPT

## 2020-08-28 PROCEDURE — 80307 DRUG TEST PRSMV CHEM ANLYZR: CPT

## 2020-08-28 PROCEDURE — U0002 COVID-19 LAB TEST NON-CDC: HCPCS

## 2020-08-28 PROCEDURE — 85025 COMPLETE CBC W/AUTO DIFF WBC: CPT

## 2020-08-28 PROCEDURE — 80320 DRUG SCREEN QUANTALCOHOLS: CPT

## 2020-08-28 PROCEDURE — 25000003 PHARM REV CODE 250: Performed by: EMERGENCY MEDICINE

## 2020-08-28 PROCEDURE — 82550 ASSAY OF CK (CPK): CPT

## 2020-08-28 PROCEDURE — 83605 ASSAY OF LACTIC ACID: CPT

## 2020-08-28 PROCEDURE — 81001 URINALYSIS AUTO W/SCOPE: CPT

## 2020-08-28 RX ORDER — ACETAMINOPHEN 500 MG
1000 TABLET ORAL
Status: COMPLETED | OUTPATIENT
Start: 2020-08-28 | End: 2020-08-28

## 2020-08-28 RX ORDER — LEVETIRACETAM 15 MG/ML
1500 INJECTION INTRAVASCULAR
Status: COMPLETED | OUTPATIENT
Start: 2020-08-28 | End: 2020-08-28

## 2020-08-28 RX ORDER — ALPRAZOLAM 0.5 MG/1
1 TABLET ORAL
Status: COMPLETED | OUTPATIENT
Start: 2020-08-28 | End: 2020-08-28

## 2020-08-28 RX ORDER — LORAZEPAM 2 MG/ML
1 INJECTION INTRAMUSCULAR
Status: COMPLETED | OUTPATIENT
Start: 2020-08-28 | End: 2020-08-28

## 2020-08-28 RX ORDER — LEVETIRACETAM 500 MG/1
500 TABLET ORAL 2 TIMES DAILY
Qty: 60 TABLET | Refills: 0 | OUTPATIENT
Start: 2020-08-28 | End: 2020-08-29

## 2020-08-28 RX ADMIN — LEVETIRACETAM INJECTION 1500 MG: 15 INJECTION INTRAVENOUS at 12:08

## 2020-08-28 RX ADMIN — ACETAMINOPHEN 1000 MG: 500 TABLET ORAL at 12:08

## 2020-08-28 RX ADMIN — ALPRAZOLAM 1 MG: 0.5 TABLET ORAL at 01:08

## 2020-08-28 RX ADMIN — LORAZEPAM 1 MG: 2 INJECTION INTRAMUSCULAR; INTRAVENOUS at 04:08

## 2020-08-28 RX ADMIN — SODIUM CHLORIDE 1000 ML: 0.9 INJECTION, SOLUTION INTRAVENOUS at 12:08

## 2020-08-28 NOTE — ED NOTES
"Pt continuously removing monitoring, BP cuff noted to be on floor, pulse oximeter to R index finger removed and cardiac leads off of pt. Pt refuses to keep monitoring placed after repeated education. Pt states "I didn't do that".   "

## 2020-08-28 NOTE — ED NOTES
"At 0205- Report received from ELI Lombardi. Assume care of pt. Pt noted to be restless in stretcher, pt states "I just cant stay still".  NAD noted at this time. Respirations even and unlabored and visible chest rise noted. Urinal at bedside and pt educated on the need for urine sample.Pt on continuous cardiac, BP, and O2 monitoring. Call light within reach.  No needs at this time. Will continue to monitor.     "

## 2020-08-28 NOTE — ED NOTES
"Upon arrival into ED room from MRI pt states "when will the doctor be back to see me because I want my xanax". Educated pt that he previously received his home dose of xanax. Pt stats "well I need more". Pt refusing to wear c-collar, pt refused monitoring. Pt hospital gown noted to be on the floor, pt states "I dont need that either". Respirations even and unlabored. NAD noted. MD Rick updated on pt status.   "

## 2020-08-28 NOTE — ED NOTES
At 0600- per MRI tech, unable to obtain complete MRI due to pt uncooperative and moving during scan. MD Rick and MD Kia made aware.

## 2020-08-28 NOTE — DISCHARGE INSTRUCTIONS
Tests today showed:   Labs Reviewed   CBC W/ AUTO DIFFERENTIAL - Abnormal; Notable for the following components:       Result Value    RBC 4.02 (*)     Hemoglobin 13.2 (*)     Hematocrit 39.2 (*)     Mean Corpuscular Hemoglobin 32.8 (*)     Platelets 79 (*)     All other components within normal limits   URINALYSIS, REFLEX TO URINE CULTURE - Abnormal; Notable for the following components:    Ketones, UA Trace (*)     Occult Blood UA 1+ (*)     All other components within normal limits    Narrative:     Specimen Source->Urine   COMPREHENSIVE METABOLIC PANEL - Abnormal; Notable for the following components:    Chloride 116 (*)     CO2 16 (*)     Calcium 7.8 (*)     Albumin 3.0 (*)     All other components within normal limits   LACTIC ACID, PLASMA   SARS-COV-2 RNA AMPLIFICATION, QUAL   TOXICOLOGY SCREEN, URINE, RANDOM (COMPLIANCE)    Narrative:     Specimen Source->Urine   TSH   ALCOHOL,MEDICAL (ETHANOL)   CK   URINALYSIS MICROSCOPIC    Narrative:     Specimen Source->Urine     X-Ray Cervical Spine Flexion And Extension Only   Final Result   Abnormal      Grade 1 anterolisthesis of C2 on C3 is seen similar to noted on prior CT exam.  Segmental instability at C2-3 is suggested with an interval change in the degree of anterolisthesis from 2.6 mm on extension to 6.2 mm on flexion.  No prevertebral soft tissue swelling is noted.  Further evaluation as clinically indicated.      This report was flagged in Epic as abnormal.         Electronically signed by: Homer Garrido MD   Date:    08/28/2020   Time:    04:04      X-Ray Hip 2 View Left   Final Result      There is no evidence of fracture or subluxation.         Electronically signed by: Homer Garrido MD   Date:    08/28/2020   Time:    03:37      CT Head Without Contrast   Final Result      No acute intracranial findings.         Electronically signed by: William Johnson   Date:    08/28/2020   Time:    01:46      CT Cervical Spine Without Contrast   Final Result       1. No acute cervical spine fracture.   2. Postoperative and degenerative findings, as described.         Electronically signed by: William Johnson   Date:    08/28/2020   Time:    01:51      MRI Cervical Spine Without Contrast    (Results Pending)       Treatments you had today:   Medications   sodium chloride 0.9% bolus 1,000 mL (0 mLs Intravenous Stopped 8/28/20 0218)   levETIRAcetam in NaCl (iso-os) IVPB 1,500 mg (0 mg Intravenous Stopped 8/28/20 0129)   acetaminophen tablet 1,000 mg (1,000 mg Oral Given 8/28/20 0042)   ALPRAZolam tablet 1 mg (1 mg Oral Given 8/28/20 0127)   lorazepam injection 1 mg (1 mg Intravenous Given 8/28/20 0429)       Follow-Up Plan:  - Follow-up with primary care doctor within 3 - 5 days  - Additional testing and/or evaluation as directed by your primary doctor    Return to the Emergency Department for symptoms including but not limited to: worsening symptoms, shortness of breath or chest pain, vomiting with inability to hold down fluids, fevers greater than 100.4°F, passing out/fainting/unconsciousness, or other concerning symptoms.

## 2020-08-28 NOTE — ED NOTES
"Pt noted to be continuously taking off c-collar with repeated education for the need to keep it on until MD takes it off. Pt verbalized understanding and states "I just don't want it, I can not keep still, it uncomfortable". MD Kia made aware.   "

## 2020-08-28 NOTE — ED PROVIDER NOTES
Source of History:  Patient    Chief complaint:  Fall (Patient reports head and neck pain. States that he woke up on the floor of his apartment with neck and back pain. Reports history of seizures. )      HPI:  Josiah Richter is a 65 y.o. male with history of psychogenic nonepileptic seizures, polysubstance abuse, CAD, chronic hep C, type 2 diabetes, COPD presenting to emergency department with complaint of head and neck pain.    Patient states that he lives alone.  He states that the last thing he remembers was watching TV sometime this morning.  He states that he then woke up on the floor of his home this evening with neck pain and posterior head pain.  He denies any visual changes or vomiting.  No fevers, or new cough or shortness of breath.     He states that he ran out of his Keppra a while ago and is not sure exactly when.  He also notes that he ran out of Xanax 2 days ago.  He was taking half of a 2 mg tablet twice daily.    He denies any alcohol use.  Denies fevers or chills.  No neck stiffness or photophobia.  No abdominal pain.    He also notes new pain in his left hip.    ROS: As per HPI and below:  Review of Systems   Constitutional: Negative for fever.   HENT: Negative for sore throat.    Eyes: Negative for double vision.   Respiratory: Negative for cough and shortness of breath.    Cardiovascular: Negative for chest pain.   Gastrointestinal: Negative for abdominal pain and vomiting.   Genitourinary: Negative for dysuria.   Musculoskeletal: Positive for joint pain and neck pain. Negative for back pain and falls.   Skin: Negative for rash.   Neurological: Positive for seizures, loss of consciousness and headaches. Negative for sensory change, speech change and focal weakness.     Review of patient's allergies indicates:   Allergen Reactions    Eggs [egg derived] Anaphylaxis    Mayonnaise Anaphylaxis    Milk containing products Anaphylaxis    Mustard Anaphylaxis       No current  "facility-administered medications on file prior to encounter.      Current Outpatient Medications on File Prior to Encounter   Medication Sig Dispense Refill    ALPRAZolam (XANAX) 2 MG Tab Take 2 mg by mouth.      atorvastatin (LIPITOR) 20 MG tablet Take 20 mg by mouth.      clopidogrel (PLAVIX) 75 mg tablet Take 1 tablet (75 mg total) by mouth once daily. 14 tablet 0    gabapentin (NEURONTIN) 300 MG capsule TK 1 C PO TID PRN 90 capsule 2    HYDROcodone-acetaminophen (NORCO) 7.5-325 mg per tablet Take 1 tablet by mouth every 6 (six) hours as needed for Pain. (Patient not taking: Reported on 4/17/2020) 10 tablet 0    ibuprofen (ADVIL,MOTRIN) 800 MG tablet Take 1 tablet (800 mg total) by mouth every 6 (six) hours as needed for Pain. 20 tablet 0    ipratropium (ATROVENT) 42 mcg (0.06 %) nasal spray 2 sprays by Nasal route 4 (four) times daily. (Patient not taking: Reported on 10/14/2019) 15 mL 3    ketorolac (TORADOL) 10 mg tablet Take 1 tablet (10 mg total) by mouth every 6 (six) hours. (Patient not taking: Reported on 4/17/2020) 15 tablet 0    ondansetron (ZOFRAN) 4 MG tablet Take 1 tablet (4 mg total) by mouth every 6 (six) hours. (Patient not taking: Reported on 4/17/2020) 12 tablet 0    SUBOXONE 8-2 mg Film DISSOLVE 1 FILM UNT TID  0    VENTOLIN HFA 90 mcg/actuation inhaler inhale ONE PUFF EVERY 4 TO 6 HOURS IF NEED FOR cough 18 g 0    [DISCONTINUED] levETIRAcetam (KEPPRA) 500 MG Tab Take by mouth.          PMH:  As per HPI and below:  Past Medical History:   Diagnosis Date    Anxiety     Cirrhosis     Diabetes mellitus 2017    Diabetes mellitus, type 2     HCV (hepatitis C virus)     Heart damage     "hole in heart"    Kidney stone     s/p L ureteral stent placement on 5/29/2018. Infection cleared and he subsequently had a stent removal    Seizures     Urinary tract infection      Past Surgical History:   Procedure Laterality Date    INTRACARDIAC ECHOCARDIOGRAPHY N/A 10/17/2019    Procedure: " "Intracardiac echocardiogram;  Surgeon: William Edmonds MD;  Location: [facility] CATH LAB;  Service: Cardiology;  Laterality: N/A;    KIDNEY STONE SURGERY      LEFT HEART CATHETERIZATION N/A 10/17/2019    Procedure: Left heart cath;  Surgeon: William Edmonds MD;  Location: [facility] CATH LAB;  Service: Cardiology;  Laterality: N/A;    NECK SURGERY      secondary to previous fall       Social History     Socioeconomic History    Marital status:      Spouse name: Not on file    Number of children: Not on file    Years of education: Not on file    Highest education level: Not on file   Occupational History    Not on file   Social Needs    Financial resource strain: Not on file    Food insecurity     Worry: Not on file     Inability: Not on file    Transportation needs     Medical: Not on file     Non-medical: Not on file   Tobacco Use    Smoking status: Current Every Day Smoker     Packs/day: 0.50     Years: 40.00     Pack years: 20.00     Types: Cigarettes     Start date:      Last attempt to quit: 2019     Years since quittin.5    Smokeless tobacco: Never Used    Tobacco comment: states "very few"    Substance and Sexual Activity    Alcohol use: No    Drug use: Not Currently     Types: Oxycodone    Sexual activity: Not Currently   Lifestyle    Physical activity     Days per week: Not on file     Minutes per session: Not on file    Stress: Not on file   Relationships    Social connections     Talks on phone: Not on file     Gets together: Not on file     Attends Yazidism service: Not on file     Active member of club or organization: Not on file     Attends meetings of clubs or organizations: Not on file     Relationship status: Not on file   Other Topics Concern    Not on file   Social History Narrative    Not on file       Family History   Problem Relation Age of Onset    Hypertension Mother     Diabetes Mother     Stroke Mother     Coronary artery disease Father         CABG "    Prostate cancer Neg Hx     Kidney disease Neg Hx     Colon cancer Neg Hx        Physical Exam:      Vitals:    08/28/20 0453   BP:    Pulse: 90   Resp:    Temp:      Gen: No acute distress.  Thin.  Hard of hearing.  Mental Status:  Alert and oriented x 3.  Appropriate, conversant.  Skin: Warm, dry. No rashes seen.  Eyes: No conjunctival injection.  Pulm: No increased work of breathing.  No significant tachypnea.  No audible stridor or wheezing.  No conversational dyspnea.  Auscultation limited secondary to PPE.  CV: Regular rate. Regular rhythm. Normal peripheral perfusion. No lower extremity edema.  Abd: Soft.  Not distended.  Nontender.   MSK:  Tenderness to palpation of the cervical spine.  No step-off or deformity noted.  Patient is in a cervical collar.  Pain with ROM to the left hip.  No deformities.    Neuro: Awake. Speech normal. No focal neuro deficit observed.    Laboratory Studies:  Labs Reviewed   CBC W/ AUTO DIFFERENTIAL - Abnormal; Notable for the following components:       Result Value    RBC 4.02 (*)     Hemoglobin 13.2 (*)     Hematocrit 39.2 (*)     Mean Corpuscular Hemoglobin 32.8 (*)     Platelets 79 (*)     All other components within normal limits   URINALYSIS, REFLEX TO URINE CULTURE - Abnormal; Notable for the following components:    Ketones, UA Trace (*)     Occult Blood UA 1+ (*)     All other components within normal limits    Narrative:     Specimen Source->Urine   COMPREHENSIVE METABOLIC PANEL - Abnormal; Notable for the following components:    Chloride 116 (*)     CO2 16 (*)     Calcium 7.8 (*)     Albumin 3.0 (*)     All other components within normal limits   LACTIC ACID, PLASMA   SARS-COV-2 RNA AMPLIFICATION, QUAL   TOXICOLOGY SCREEN, URINE, RANDOM (COMPLIANCE)    Narrative:     Specimen Source->Urine   TSH   ALCOHOL,MEDICAL (ETHANOL)   CK   URINALYSIS MICROSCOPIC    Narrative:     Specimen Source->Urine     Chart reviewed.    Imaging Results          MRI Cervical Spine  Without Contrast (Final result)  Result time 08/28/20 06:07:40    Final result by Isaias Haider DO (08/28/20 06:07:40)                 Impression:      Nondiagnostic examination.      Electronically signed by: Isaias Haider  Date:    08/28/2020  Time:    06:07             Narrative:    EXAMINATION:  MRI CERVICAL SPINE WITHOUT CONTRAST    CLINICAL HISTORY:  Neck pain, initial exam;Neck trauma, impaired ROM (Age < 65y).    TECHNIQUE:  Magnetic resonance images of the cervical spine without intravenous contrast.  Examination was terminated early, and only sagittal T2 and sagittal stir sequences were submitted for review.    COMPARISON:  CT cervical spine and cervical spine radiographs from 08/28/2020.    FINDINGS:  Examination is nondiagnostic due to extreme patient motion artifact and limited evaluation, with only sagittal stir and sagittal T2 sequences submitted.                                X-Ray Cervical Spine Flexion And Extension Only (Final result)  Result time 08/28/20 04:04:05    Final result by Homer Garrido MD (08/28/20 04:04:05)                 Impression:      Grade 1 anterolisthesis of C2 on C3 is seen similar to noted on prior CT exam.  Segmental instability at C2-3 is suggested with an interval change in the degree of anterolisthesis from 2.6 mm on extension to 6.2 mm on flexion.  No prevertebral soft tissue swelling is noted.  Further evaluation as clinically indicated.    This report was flagged in Epic as abnormal.      Electronically signed by: Homer Garrido MD  Date:    08/28/2020  Time:    04:04             Narrative:    EXAMINATION:  XR CERVICAL SPINE FLEXION  AND EXTENSION ONLY    CLINICAL HISTORY:  Cervicalgia    TECHNIQUE:  Lateral cervical flexion and extension views were performed.    COMPARISON:  CT cervical spine 08/28/2020.    FINDINGS:  Postop changes of C4 through C6 ACDF with metal plate and and screws again noted.  C5-6 interbody fusion again noted.  Grade 1  anterolisthesis of C2 on C3 is seen similar to noted on prior CT exam.  Segmental instability at C2-3 is suggested with an interval change in the degree of anterolisthesis from 2.6 mm on extension to 6.2 mm on flexion.  No prevertebral soft tissue swelling identified.                               X-Ray Hip 2 View Left (Final result)  Result time 08/28/20 03:37:33    Final result by Homer Garrido MD (08/28/20 03:37:33)                 Impression:      There is no evidence of fracture or subluxation.      Electronically signed by: Homer Garrido MD  Date:    08/28/2020  Time:    03:37             Narrative:    EXAMINATION:  XR HIP 2 VIEW LEFT    CLINICAL HISTORY:  Pain in unspecified hip    TECHNIQUE:  AP view of the pelvis and frog leg lateral view of the left hip were performed.    COMPARISON:  None    FINDINGS:  No fractures or dislocations.  Unremarkable visualized bony structures.                               CT Head Without Contrast (Final result)  Result time 08/28/20 01:46:50    Final result by William Johnson MD (08/28/20 01:46:50)                 Impression:      No acute intracranial findings.      Electronically signed by: William Johnson  Date:    08/28/2020  Time:    01:46             Narrative:    EXAMINATION:  CT HEAD WITHOUT CONTRAST    CLINICAL HISTORY:  Seizure, nontraumatic (Age => 42y);    TECHNIQUE:  Low dose axial images were obtained through the head.  Coronal and sagittal reformations were also performed. Contrast was not administered.    COMPARISON:  CT head performed 06/07/2019    FINDINGS:  No acute intracranial hemorrhage, mass effect, or midline shift.  No evidence of acute transcortical infarct by noncontrast CT.  Mild generalized age-related parenchymal volume loss.  No evidence of hydrocephalus.  Basal cisterns remain patent.  No extra-axial fluid collection is seen.  No aggressive osseous lesion.  No displaced fracture.  Relatively minor degree of paranasal sinus mucosal  thickening.  Mastoid air cells appear grossly clear.  Extracranial soft tissue structures unremarkable.                               CT Cervical Spine Without Contrast (Final result)  Result time 08/28/20 01:51:25    Final result by William Johnson MD (08/28/20 01:51:25)                 Impression:      1. No acute cervical spine fracture.  2. Postoperative and degenerative findings, as described.      Electronically signed by: William Johnson  Date:    08/28/2020  Time:    01:51             Narrative:    EXAMINATION:  CT CERVICAL SPINE WITHOUT CONTRAST    CLINICAL HISTORY:  Neck pain, initial exam;Neck pain, recent trauma;    TECHNIQUE:  Low dose axial images, sagittal and coronal reformations were performed though the cervical spine.  Contrast was not administered.    COMPARISON:  None    FINDINGS:  Fractures: No acute fractures    Alignment: No acute traumatic subluxation is suggested.  Status post C4-C6 ACDF.  Osseous incorporation across the C5-6 interbody spacer is noted.  No significant osseous bridging across the C4-5 disc space.  No definite hardware fracture or other complications identified.  Atlanto-axial and atlanto-occipital joints: Maintained  Facet joints: Multilevel degenerative findings, most advanced at right C2-3.  Vertebral bodies: Postoperative findings, as above.  Elsewhere, degenerative endplate irregularity and sclerosis with subchondral cyst formation.  Discs: Disc osteophyte complexes are noted at the non operative levels.  Spinal canal and foraminal narrowing: No critical spinal canal or foraminal narrowing  Paraspinal soft tissues: Unremarkable.    Upper Lungs:Clear                              Medications Given:  Medications   sodium chloride 0.9% bolus 1,000 mL (0 mLs Intravenous Stopped 8/28/20 0218)   levETIRAcetam in NaCl (iso-os) IVPB 1,500 mg (0 mg Intravenous Stopped 8/28/20 0129)   acetaminophen tablet 1,000 mg (1,000 mg Oral Given 8/28/20 0042)   ALPRAZolam tablet 1 mg (1 mg  "Oral Given 8/28/20 0127)   lorazepam injection 1 mg (1 mg Intravenous Given 8/28/20 6539)     MDM:    65 y.o. male with complaint of prolonged unresponsive episode today, and new head and neck and left hip pain.  Here he is afebrile rectally, stable, nontoxic.  No focal neuro deficit noted.    Differential diagnosis including but not limited to:  Seizure, pseudo-seizure, metabolic or electrolyte abnormality, urinary tract infection, hip fracture, cervical spine fracture, skull fracture, rhabdomyolysis, intra cranial abnormality.  Doubt benzodiazepine or alcohol withdrawal.    CBC unremarkable, CMP likewise without acute abnormality aside from some mild acidosis.  Lactate not elevated, .  No acute kidney injury.  Blood alcohol within normal limits, TSH within normal limits.    Patient denied use opioids, however his urine drug screen is positive for opioids and amphetamines.  While in the emergency department, he was requesting opioids multiple times and also something to relax me."     CT of his cervical spine was unremarkable, however his flex/ex x-ray showed "Segmental instability at C2-3 is suggested with an interval change in the degree of anterolisthesis from 2.6 mm on extension to 6.2 mm on flexion."  He continued to complain of pain.  He was left in a soft cervical collar and an MRI was ordered.    Multiple times throughout his emergency department stay patient removed his cervical collar.  He was counseled that he needs to leave it on, however the patient self discontinued it multiple times.    We attempted to obtain an MRI, however patient was not able to stay still.  He continued to rib office cervical collar.  He was advised that he needed to leave it on but he was not compliant with this.  He will be discharged home.    Diagnostic Impression:    1. Hip pain    2. Neck pain         ED Disposition Condition    Discharge Stable        ED Prescriptions     Medication Sig Dispense Start Date End Date " Auth. Provider    levETIRAcetam (KEPPRA) 500 MG Tab Take 1 tablet (500 mg total) by mouth 2 (two) times daily. 60 tablet 8/28/2020 9/27/2020 Kiesha Adam MD        Follow-up Information     Follow up With Specialties Details Why Contact Info    Tierra Max MD Internal Medicine Schedule an appointment as soon as possible for a visit in 1 week  [address] [address]  [phone]                          Patient and/or family understands the plan and is in agreement, verbalized understanding, questions answered    Kiesha Adam MD  Emergency Medicine       Kiesha Adam MD  08/28/20 0229

## 2020-08-28 NOTE — PROVIDER PROGRESS NOTES - EMERGENCY DEPT.
"Encounter Date: 8/27/2020    ED Physician Progress Notes           ED STAFF ATTENDING PHYSICIAN HAND-OFF NOTE:  5:51 AM 8/28/2020  Josiah Richter is a 65 y.o. male who presented to the ED on 8/28/2020 and has been managed by Dr. Adam. I assumed care of patient from off-going ED physician team at 5:51 AM pending imaging results.  On my examination he denies any numbness or weakness to upper lower extremities.  He denies any chest pain, shortness of breath, abdominal or back pain.  No numbness or weakness to bilateral lower extremities.  He denies any chest pain on exertion, dyspnea on exertion, decreased exercise tolerance in the last week.  No reported fever chills in the last week.    On my evaluation, Josiah Richter appears well, hemodynamically stable and in NAD. Thus far, Josiah Richter has received:  Medications   sodium chloride 0.9% bolus 1,000 mL (0 mLs Intravenous Stopped 8/28/20 0218)   levETIRAcetam in NaCl (iso-os) IVPB 1,500 mg (0 mg Intravenous Stopped 8/28/20 0129)   acetaminophen tablet 1,000 mg (1,000 mg Oral Given 8/28/20 0042)   ALPRAZolam tablet 1 mg (1 mg Oral Given 8/28/20 0127)   lorazepam injection 1 mg (1 mg Intravenous Given 8/28/20 0429)       On my exam, I appreciate:  /83   Pulse 90   Temp 98.8 °F (37.1 °C) (Rectal)   Resp (!) 22   Ht 5' 6" (1.676 m)   Wt 74.8 kg (165 lb)   SpO2 98%   BMI 26.63 kg/m²   Constitutional: Well-appearing; Well-Nourished; Non-Toxic-appearing and in NAD.  Head/Face: AT/NC & No Facial asymmetry.  Oropharynx: Speaking Full Sentences with No drooling or slurring of speech.  Cardiovascular: Reg Rate; Reg Rhythm; No Murmurs.  Pulmonary/Chest: AT Thorax with Lungs CTA B/L.  Abdominal: Soft, ND, NT.  Musculoskeletal: FROM, NML Gait.  Neuro/Psych: Calm; Cooperative, Following Command, Answering Questions Appropriately, and No Gait/Balance deficits.       MRI Cervical Spine Without Contrast (Final result)  Result time 08/28/20 06:07:40  Final " result by Isaias Haider DO (08/28/20 06:07:40)                Impression:      Nondiagnostic examination.       Electronically signed by: Isaias Haider   Date: 08/28/2020   Time: 06:07            Narrative:    EXAMINATION:   MRI CERVICAL SPINE WITHOUT CONTRAST     CLINICAL HISTORY:   Neck pain, initial exam;Neck trauma, impaired ROM (Age < 65y).     TECHNIQUE:   Magnetic resonance images of the cervical spine without intravenous contrast.  Examination was terminated early, and only sagittal T2 and sagittal stir sequences were submitted for review.     COMPARISON:   CT cervical spine and cervical spine radiographs from 08/28/2020.     FINDINGS:   Examination is nondiagnostic due to extreme patient motion artifact and limited evaluation, with only sagittal stir and sagittal T2 sequences submitted.                     Contains abnormal data X-Ray Cervical Spine Flexion And Extension Only (Final result)  Result time 08/28/20 04:04:05  Final result by Homer Garrido MD (08/28/20 04:04:05)                Impression:      Grade 1 anterolisthesis of C2 on C3 is seen similar to noted on prior CT exam.  Segmental instability at C2-3 is suggested with an interval change in the degree of anterolisthesis from 2.6 mm on extension to 6.2 mm on flexion.  No prevertebral soft tissue swelling is noted.  Further evaluation as clinically indicated.     This report was flagged in Epic as abnormal.       Electronically signed by: Homer Garrido MD   Date: 08/28/2020   Time: 04:04            Narrative:    EXAMINATION:   XR CERVICAL SPINE FLEXION  AND EXTENSION ONLY     CLINICAL HISTORY:   Cervicalgia     TECHNIQUE:   Lateral cervical flexion and extension views were performed.     COMPARISON:   CT cervical spine 08/28/2020.     FINDINGS:   Postop changes of C4 through C6 ACDF with metal plate and and screws again noted.  C5-6 interbody fusion again noted.  Grade 1 anterolisthesis of C2 on C3 is seen similar to noted on prior CT  exam.  Segmental instability at C2-3 is suggested with an interval change in the degree of anterolisthesis from 2.6 mm on extension to 6.2 mm on flexion.  No prevertebral soft tissue swelling identified.                     X-Ray Hip 2 View Left (Final result)  Result time 08/28/20 03:37:33  Final result by Homer Garrido MD (08/28/20 03:37:33)                Impression:      There is no evidence of fracture or subluxation.       Electronically signed by: Homer Garrido MD   Date: 08/28/2020   Time: 03:37            Narrative:    EXAMINATION:   XR HIP 2 VIEW LEFT     CLINICAL HISTORY:   Pain in unspecified hip     TECHNIQUE:   AP view of the pelvis and frog leg lateral view of the left hip were performed.     COMPARISON:   None     FINDINGS:   No fractures or dislocations.  Unremarkable visualized bony structures.                     CT Head Without Contrast (Final result)  Result time 08/28/20 01:46:50  Final result by William Johnson MD (08/28/20 01:46:50)                Impression:      No acute intracranial findings.       Electronically signed by: William Johnson   Date: 08/28/2020   Time: 01:46            Narrative:    EXAMINATION:   CT HEAD WITHOUT CONTRAST     CLINICAL HISTORY:   Seizure, nontraumatic (Age => 42y);     TECHNIQUE:   Low dose axial images were obtained through the head.  Coronal and sagittal reformations were also performed. Contrast was not administered.     COMPARISON:   CT head performed 06/07/2019     FINDINGS:   No acute intracranial hemorrhage, mass effect, or midline shift.  No evidence of acute transcortical infarct by noncontrast CT.  Mild generalized age-related parenchymal volume loss.  No evidence of hydrocephalus.  Basal cisterns remain patent.  No extra-axial fluid collection is seen.  No aggressive osseous lesion.  No displaced fracture.  Relatively minor degree of paranasal sinus mucosal thickening.  Mastoid air cells appear grossly clear.  Extracranial soft tissue  structures unremarkable.                     CT Cervical Spine Without Contrast (Final result)  Result time 08/28/20 01:51:25  Final result by William Johnson MD (08/28/20 01:51:25)                Impression:      1. No acute cervical spine fracture.   2. Postoperative and degenerative findings, as described.       Electronically signed by: William Johnson   Date: 08/28/2020   Time: 01:51            Narrative:    EXAMINATION:   CT CERVICAL SPINE WITHOUT CONTRAST     CLINICAL HISTORY:   Neck pain, initial exam;Neck pain, recent trauma;     TECHNIQUE:   Low dose axial images, sagittal and coronal reformations were performed though the cervical spine.  Contrast was not administered.     COMPARISON:   None     FINDINGS:   Fractures: No acute fractures     Alignment: No acute traumatic subluxation is suggested.  Status post C4-C6 ACDF.  Osseous incorporation across the C5-6 interbody spacer is noted.  No significant osseous bridging across the C4-5 disc space.  No definite hardware fracture or other complications identified.   Atlanto-axial and atlanto-occipital joints: Maintained   Facet joints: Multilevel degenerative findings, most advanced at right C2-3.   Vertebral bodies: Postoperative findings, as above.  Elsewhere, degenerative endplate irregularity and sclerosis with subchondral cyst formation.   Discs: Disc osteophyte complexes are noted at the non operative levels.   Spinal canal and foraminal narrowing: No critical spinal canal or foraminal narrowing   Paraspinal soft tissues: Unremarkable.               RN reports the patient to cut his C-collar because he is not having any further pain or complaints.  Continues with no neurological deficits.    I have discussed and counseled Josiah Richter regarding exam, results, diagnosis, treatment, and plan.  ______________________  Paulo Cabrera MD, [facility]  Emergency Medicine Staff  5:51 AM 8/28/2020    F/U:  I personally ambulated patient around the ED, he denied  any headache, dizziness, numbness or weakness to upper lower extremities and had no ataxia or ambulation instability.  We discussed that he if he does develop any symptoms, to return to emergency department, which she acknowledged.  No cervical range of motion deficit, or midline tenderness to palpation.  Given suboptimal MRI results, I recommended soft C-collar, which he refused.  Patient has capacity to refuse.  ____________________  Paulo Cabrera MD, [facility]  Emergency Medicine Staff  7:31 AM 8/28/2020

## 2020-08-28 NOTE — ED NOTES
At 0509- pt c-collar noted to be on the floor, pt refusing to keep c-collar in place. Pt verbalizes understanding of risks of not having c-collar in place. MD Kia made aware.

## 2020-08-28 NOTE — ED TRIAGE NOTES
"Josiah Richter, a 65 y.o. male presents to the ED w/ complaint of head and neck pain. Reports hx of seizures.    Triage note:  Chief Complaint   Patient presents with    Fall     Patient reports head and neck pain. States that he woke up on the floor of his apartment with neck and back pain. Reports history of seizures.      Review of patient's allergies indicates:   Allergen Reactions    Eggs [egg derived] Anaphylaxis    Mayonnaise Anaphylaxis    Milk containing products Anaphylaxis    Mustard Anaphylaxis     Past Medical History:   Diagnosis Date    Anxiety     Cirrhosis     Diabetes mellitus 2017    Diabetes mellitus, type 2     HCV (hepatitis C virus)     Heart damage     "hole in heart"    Kidney stone     s/p L ureteral stent placement on 5/29/2018. Infection cleared and he subsequently had a stent removal    Urinary tract infection      LOC: The patient is awake, alert, and oriented to place, time, situation. Affect is appropriate.  Speech is appropriate and clear.   APPEARANCE: Patient resting comfortably in no acute distress.  Patient is clean and well groomed.  SKIN: The skin is warm and dry; color consistent with ethnicity.  Patient has normal skin turgor and moist mucus membranes.  Skin intact; no breakdown or bruising noted.   MUSCULOSKELETAL: Patient moving upper and lower extremities without difficulty.  Denies weakness.   RESPIRATORY: Airway is open and patent. Respirations spontaneous, even, easy, and non-labored.  Patient has a normal effort and rate.  No accessory muscle use noted. Denies cough.   CARDIAC:  Normal rhythm and rate noted.  No peripheral edema noted. No complaints of chest pain.    ABDOMEN:  No distention noted.   NEUROLOGIC: Eyes open spontaneously.  Behavior appropriate to situation.  Follows commands; facial expression symmetrical.  Purposeful motor response noted; normal sensation in all extremities.      "

## 2020-08-29 ENCOUNTER — HOSPITAL ENCOUNTER (EMERGENCY)
Facility: HOSPITAL | Age: 65
Discharge: HOME | End: 2020-08-29
Attending: EMERGENCY MEDICINE
Payer: MEDICARE

## 2020-08-29 VITALS
DIASTOLIC BLOOD PRESSURE: 68 MMHG | BODY MASS INDEX: 25.71 KG/M2 | RESPIRATION RATE: 21 BRPM | HEIGHT: 66 IN | HEART RATE: 50 BPM | TEMPERATURE: 98 F | WEIGHT: 160 LBS | OXYGEN SATURATION: 99 % | SYSTOLIC BLOOD PRESSURE: 146 MMHG

## 2020-08-29 VITALS
SYSTOLIC BLOOD PRESSURE: 136 MMHG | BODY MASS INDEX: 25.71 KG/M2 | HEIGHT: 66 IN | HEART RATE: 70 BPM | RESPIRATION RATE: 16 BRPM | DIASTOLIC BLOOD PRESSURE: 78 MMHG | OXYGEN SATURATION: 99 % | TEMPERATURE: 98 F | WEIGHT: 160 LBS

## 2020-08-29 DIAGNOSIS — M54.2 NECK PAIN: Primary | ICD-10-CM

## 2020-08-29 DIAGNOSIS — Z76.0 PRESCRIPTION REFILL: Primary | ICD-10-CM

## 2020-08-29 DIAGNOSIS — Z87.898 HISTORY OF SEIZURE: ICD-10-CM

## 2020-08-29 LAB
ALBUMIN SERPL BCP-MCNC: 3.4 G/DL (ref 3.5–5.2)
ALP SERPL-CCNC: 68 U/L (ref 55–135)
ALT SERPL W/O P-5'-P-CCNC: 31 U/L (ref 10–44)
ANION GAP SERPL CALC-SCNC: 8 MMOL/L (ref 8–16)
AST SERPL-CCNC: 36 U/L (ref 10–40)
BASOPHILS # BLD AUTO: 0.02 K/UL (ref 0–0.2)
BASOPHILS NFR BLD: 0.4 % (ref 0–1.9)
BILIRUB SERPL-MCNC: 0.4 MG/DL (ref 0.1–1)
BILIRUB UR QL STRIP: NEGATIVE
BUN SERPL-MCNC: 22 MG/DL (ref 8–23)
CALCIUM SERPL-MCNC: 8.4 MG/DL (ref 8.7–10.5)
CHLORIDE SERPL-SCNC: 114 MMOL/L (ref 95–110)
CK SERPL-CCNC: 269 U/L (ref 20–200)
CLARITY UR REFRACT.AUTO: CLEAR
CO2 SERPL-SCNC: 18 MMOL/L (ref 23–29)
COLOR UR AUTO: YELLOW
CREAT SERPL-MCNC: 1.2 MG/DL (ref 0.5–1.4)
DIFFERENTIAL METHOD: ABNORMAL
EOSINOPHIL # BLD AUTO: 0 K/UL (ref 0–0.5)
EOSINOPHIL NFR BLD: 0.8 % (ref 0–8)
ERYTHROCYTE [DISTWIDTH] IN BLOOD BY AUTOMATED COUNT: 14.4 % (ref 11.5–14.5)
EST. GFR  (AFRICAN AMERICAN): >60 ML/MIN/1.73 M^2
EST. GFR  (NON AFRICAN AMERICAN): >60 ML/MIN/1.73 M^2
GLUCOSE SERPL-MCNC: 95 MG/DL (ref 70–110)
GLUCOSE UR QL STRIP: NEGATIVE
HCT VFR BLD AUTO: 38.3 % (ref 40–54)
HGB BLD-MCNC: 12.7 G/DL (ref 14–18)
HGB UR QL STRIP: NEGATIVE
IMM GRANULOCYTES # BLD AUTO: 0.02 K/UL (ref 0–0.04)
IMM GRANULOCYTES NFR BLD AUTO: 0.4 % (ref 0–0.5)
KETONES UR QL STRIP: NEGATIVE
LEUKOCYTE ESTERASE UR QL STRIP: NEGATIVE
LYMPHOCYTES # BLD AUTO: 1.4 K/UL (ref 1–4.8)
LYMPHOCYTES NFR BLD: 28.2 % (ref 18–48)
MCH RBC QN AUTO: 32.2 PG (ref 27–31)
MCHC RBC AUTO-ENTMCNC: 33.2 G/DL (ref 32–36)
MCV RBC AUTO: 97 FL (ref 82–98)
MONOCYTES # BLD AUTO: 0.3 K/UL (ref 0.3–1)
MONOCYTES NFR BLD: 6.3 % (ref 4–15)
NEUTROPHILS # BLD AUTO: 3.1 K/UL (ref 1.8–7.7)
NEUTROPHILS NFR BLD: 63.9 % (ref 38–73)
NITRITE UR QL STRIP: NEGATIVE
NRBC BLD-RTO: 0 /100 WBC
PH UR STRIP: 5 [PH] (ref 5–8)
PLATELET # BLD AUTO: 98 K/UL (ref 150–350)
PMV BLD AUTO: 10.7 FL (ref 9.2–12.9)
POCT GLUCOSE: 91 MG/DL (ref 70–110)
POTASSIUM SERPL-SCNC: 3.5 MMOL/L (ref 3.5–5.1)
PROT SERPL-MCNC: 7.4 G/DL (ref 6–8.4)
PROT UR QL STRIP: NEGATIVE
RBC # BLD AUTO: 3.94 M/UL (ref 4.6–6.2)
SODIUM SERPL-SCNC: 140 MMOL/L (ref 136–145)
SP GR UR STRIP: 1.02 (ref 1–1.03)
URN SPEC COLLECT METH UR: NORMAL
WBC # BLD AUTO: 4.89 K/UL (ref 3.9–12.7)

## 2020-08-29 PROCEDURE — 80053 COMPREHEN METABOLIC PANEL: CPT

## 2020-08-29 PROCEDURE — 82962 GLUCOSE BLOOD TEST: CPT

## 2020-08-29 PROCEDURE — 85025 COMPLETE CBC W/AUTO DIFF WBC: CPT

## 2020-08-29 PROCEDURE — 99499 UNLISTED E&M SERVICE: CPT | Mod: ,,, | Performed by: PHYSICIAN ASSISTANT

## 2020-08-29 PROCEDURE — 99285 EMERGENCY DEPT VISIT HI MDM: CPT | Mod: ,,, | Performed by: PHYSICIAN ASSISTANT

## 2020-08-29 PROCEDURE — 93010 ELECTROCARDIOGRAM REPORT: CPT | Mod: ,,, | Performed by: INTERNAL MEDICINE

## 2020-08-29 PROCEDURE — 99284 EMERGENCY DEPT VISIT MOD MDM: CPT | Mod: 25,27

## 2020-08-29 PROCEDURE — 81003 URINALYSIS AUTO W/O SCOPE: CPT

## 2020-08-29 PROCEDURE — 99285 PR EMERGENCY DEPT VISIT,LEVEL V: ICD-10-PCS | Mod: ,,, | Performed by: PHYSICIAN ASSISTANT

## 2020-08-29 PROCEDURE — 82550 ASSAY OF CK (CPK): CPT

## 2020-08-29 PROCEDURE — 25000003 PHARM REV CODE 250: Performed by: PHYSICIAN ASSISTANT

## 2020-08-29 PROCEDURE — 93005 ELECTROCARDIOGRAM TRACING: CPT

## 2020-08-29 PROCEDURE — 99499 NO LOS: ICD-10-PCS | Mod: ,,, | Performed by: PHYSICIAN ASSISTANT

## 2020-08-29 PROCEDURE — 99283 EMERGENCY DEPT VISIT LOW MDM: CPT | Mod: 25

## 2020-08-29 PROCEDURE — 93010 EKG 12-LEAD: ICD-10-PCS | Mod: ,,, | Performed by: INTERNAL MEDICINE

## 2020-08-29 RX ORDER — LEVETIRACETAM 500 MG/1
500 TABLET ORAL 2 TIMES DAILY
Qty: 60 TABLET | Refills: 0 | Status: SHIPPED | OUTPATIENT
Start: 2020-08-29 | End: 2020-09-28

## 2020-08-29 RX ORDER — ALPRAZOLAM 0.5 MG/1
1 TABLET ORAL
Status: COMPLETED | OUTPATIENT
Start: 2020-08-29 | End: 2020-08-29

## 2020-08-29 RX ORDER — LEVETIRACETAM 500 MG/1
500 TABLET ORAL 2 TIMES DAILY
Qty: 60 TABLET | Refills: 0 | Status: SHIPPED | OUTPATIENT
Start: 2020-08-29 | End: 2020-08-29 | Stop reason: SDUPTHER

## 2020-08-29 RX ORDER — LEVETIRACETAM 500 MG/1
500 TABLET ORAL
Status: COMPLETED | OUTPATIENT
Start: 2020-08-29 | End: 2020-08-29

## 2020-08-29 RX ADMIN — ALPRAZOLAM 1 MG: 0.5 TABLET ORAL at 12:08

## 2020-08-29 RX ADMIN — LEVETIRACETAM 500 MG: 500 TABLET ORAL at 11:08

## 2020-08-29 NOTE — ED NOTES
Pt escorted to the front and provided vehicle description and instructions for catching [employer] ride. ED PCT in front also informed to assist patient with finding [employer] for transport home.

## 2020-08-29 NOTE — ED TRIAGE NOTES
"Josiah Richter, a 65 y.o. male presents to the ED w/ complaint of seizure medication because he has been here twice over the last two days and was told that seizure medication was called into a pharmacy that closed.      Triage note:  Chief Complaint   Patient presents with    Re-evaluation     Returned to ER beacuse his pharmacy was closed and he couldn't  prescribed Keppra.      Review of patient's allergies indicates:   Allergen Reactions    Eggs [egg derived] Anaphylaxis    Mayonnaise Anaphylaxis    Milk containing products Anaphylaxis    Mustard Anaphylaxis     Past Medical History:   Diagnosis Date    Anxiety     Cirrhosis     Diabetes mellitus 2017    Diabetes mellitus, type 2     HCV (hepatitis C virus)     Heart damage     "hole in heart"    Kidney stone     s/p L ureteral stent placement on 5/29/2018. Infection cleared and he subsequently had a stent removal    Seizures     Urinary tract infection      "

## 2020-08-29 NOTE — ED NOTES
"Pt identifiers checked and accurate with Josiah Richter    Pt reports to ED with complaints of memory loss x 2 days and waking up on the floor possibly after having a seizure. Pt reports "running out" of Keppra and losing prescription provided to him yesterday in the ED, "running out of xanax and buying one off the street", appointment with psychiatrist Monday. Pt reports neck pain, headache, dizziness when ambulating. Pt denies CP, SOB, cough, fever, chills, vision changes, slurred speech, numbness, tingling and weakness.     LOC: The patient is awake, alert and aware of environment with an appropriate affect, the patient is oriented x 3 and speaking appropriately.  APPEARANCE: Patient resting comfortably and in no acute distress, patient is clean and well groomed, pt placed in hospital gown.   SKIN: The skin is warm and dry, color consistent with ethnicity, patient has normal skin turgor and moist mucus membranes, skin intact.  MUSCULOSKELETAL: Patient moving all extremities well, no obvious swelling or deformities noted. Pt ambulates unassisted with steady gait.   RESPIRATORY: Airway is open and patent; respirations are spontaneous, patient has a normal effort and rate.   CARDIAC: Patient has no peripheral edema noted. No complaints of chest pain at this time.   ABDOMEN: Soft and non tender to palpation, no distention noted. Bowel sounds present x 4  NEUROLOGIC: eyes open spontaneously, behavior appropriate to situation, follows commands, facial expression symmetrical, bilateral hand grasp equal and even, purposeful motor response noted, normal sensation in all extremities when touched with a finger. Pt reports headache     Resident MD at the bedside.   "

## 2020-08-29 NOTE — ED PROVIDER NOTES
"Encounter Date: 8/29/2020       History     Chief Complaint   Patient presents with    Multiple Complaints     poss isaiah hammer yest, neck pain       Patient is a 65-year-old male with a past medical history of diabetes, hepatitis-C, cirrhosis, seizure disorder, anxiety, presenting to the emergency department for evaluation of possible seizure.  The patient states that over the past 2 days he has poor memory of what happened.  He states he remembers being in the hospital, but thinks that he was supposed to be admitted.  He states that this morning he woke up on the floor.  States he does not remember how he got there.  He admits that he has pain in his neck, back, and head.  He denies any changes in his vision.  Patient states that he is out of his Xanax and needs some Xanax before his appointment with his doctor on Monday.  He denies any changes in his vision.  No numbness, tingling, weakness of upper lower extremities bilaterally.  Patient denies any alcohol or drug use.  He states he does not remember what happened between yesterday and today.This is the extent of the patient's complaints at this time.       The history is provided by the patient.     Review of patient's allergies indicates:   Allergen Reactions    Eggs [egg derived] Anaphylaxis    Mayonnaise Anaphylaxis    Milk containing products Anaphylaxis    Mustard Anaphylaxis     Past Medical History:   Diagnosis Date    Anxiety     Cirrhosis     Diabetes mellitus 2017    Diabetes mellitus, type 2     HCV (hepatitis C virus)     Heart damage     "hole in heart"    Kidney stone     s/p L ureteral stent placement on 5/29/2018. Infection cleared and he subsequently had a stent removal    Seizures     Urinary tract infection      Past Surgical History:   Procedure Laterality Date    INTRACARDIAC ECHOCARDIOGRAPHY N/A 10/17/2019    Procedure: Intracardiac echocardiogram;  Surgeon: William Edmonds MD;  Location: [facility] CATH LAB;  Service: Cardiology;  " "Laterality: N/A;    KIDNEY STONE SURGERY      LEFT HEART CATHETERIZATION N/A 10/17/2019    Procedure: Left heart cath;  Surgeon: William Edmonds MD;  Location: [facility] CATH LAB;  Service: Cardiology;  Laterality: N/A;    NECK SURGERY      secondary to previous fall     Family History   Problem Relation Age of Onset    Hypertension Mother     Diabetes Mother     Stroke Mother     Coronary artery disease Father         CABG    Prostate cancer Neg Hx     Kidney disease Neg Hx     Colon cancer Neg Hx      Social History     Tobacco Use    Smoking status: Current Every Day Smoker     Packs/day: 0.50     Years: 40.00     Pack years: 20.00     Types: Cigarettes     Start date:      Last attempt to quit: 2019     Years since quittin.5    Smokeless tobacco: Never Used    Tobacco comment: states "very few"    Substance Use Topics    Alcohol use: No    Drug use: Not Currently     Types: Oxycodone     Review of Systems   Constitutional: Negative for activity change, chills, fatigue and fever.   HENT: Negative for congestion, rhinorrhea and sore throat.    Eyes: Negative for photophobia and visual disturbance.   Respiratory: Negative for cough and shortness of breath.    Cardiovascular: Negative for chest pain.   Gastrointestinal: Negative for abdominal pain, diarrhea, nausea and vomiting.   Genitourinary: Negative for dysuria, hematuria and urgency.   Musculoskeletal: Positive for back pain, myalgias and neck pain.   Skin: Negative for color change and wound.   Neurological: Positive for seizures. Negative for weakness and headaches.   Psychiatric/Behavioral: Negative for agitation and confusion.       Physical Exam     Initial Vitals [20 1005]   BP Pulse Resp Temp SpO2   (!) 158/80 71 18 97.7 °F (36.5 °C) 98 %      MAP       --         Physical Exam    Nursing note and vitals reviewed.  Constitutional: He appears well-developed and well-nourished. He is not diaphoretic.  Non-toxic appearance. " He does not have a sickly appearance. No distress.   Thin,  male unaccompanied in the emergency room.  Speaking clear sentences.  No acute distress.   HENT:   Head: Normocephalic and atraumatic. Head is without raccoon's eyes, without Mcgowan's sign, without contusion, without right periorbital erythema and without left periorbital erythema.   Right Ear: External ear normal.   Left Ear: External ear normal.   Nose: Nose normal.   Mouth/Throat: Oropharynx is clear and moist.   No hematoma, skull depression, or signs of trauma   Eyes: Conjunctivae and EOM are normal. Pupils are equal, round, and reactive to light.   Neck: Normal range of motion. Neck supple.   Cardiovascular: Normal rate.   Pulmonary/Chest: No respiratory distress.   Musculoskeletal: Normal range of motion.      Comments: Diffuse tenderness to palpation of the cervical spine at the bilateral paraspinal muscles. No point tenderness at the cervical spine. No significant tenderness to palpation of thoracic spine at the midline as well as the bilateral paraspinal muscles.  No palpable deformity, crepitus, step-off.  No significant tenderness palpation lumbar spine at the midline or the paraspinal muscles.  5 cm, nontender lipoma noted to the right upper back.   Neurological: He is alert and oriented to person, place, and time. He has normal strength. No cranial nerve deficit or sensory deficit. GCS eye subscore is 4. GCS verbal subscore is 5. GCS motor subscore is 6.   AAOx4. CN II-XII were intact.   Skin: Skin is warm.   Psychiatric: He has a normal mood and affect. His behavior is normal. Judgment and thought content normal.         ED Course   Procedures  Labs Reviewed   CBC W/ AUTO DIFFERENTIAL - Abnormal; Notable for the following components:       Result Value    RBC 3.94 (*)     Hemoglobin 12.7 (*)     Hematocrit 38.3 (*)     Mean Corpuscular Hemoglobin 32.2 (*)     Platelets 98 (*)     All other components within normal limits    COMPREHENSIVE METABOLIC PANEL - Abnormal; Notable for the following components:    Chloride 114 (*)     CO2 18 (*)     Calcium 8.4 (*)     Albumin 3.4 (*)     All other components within normal limits   CK - Abnormal; Notable for the following components:     (*)     All other components within normal limits   URINALYSIS, REFLEX TO URINE CULTURE    Narrative:     Specimen Source->Urine   POCT GLUCOSE   POCT GLUCOSE MONITORING CONTINUOUS     EKG Readings: (Independently Interpreted)   Initial Reading: No STEMI. Previous EKG: Compared with most recent EKG Previous EKG Date: 8/27/2020. Rhythm: Sinus Bradycardia. Heart Rate: 56.          Medical Decision Making:   History:   Old Medical Records: I decided to obtain old medical records.  Old Records Summarized: other records.       <> Summary of Records:     Reviewed patient's history and the [place] prescription monitoring program for.  He receives regular prescriptions for both Xanax and Suboxone.  Last received 32 pills of Xanax 2 mg  On 8/7/2020 as well as 90 doses of Suboxone 8mg-2mg on 8/4/2020.    Initial Assessment:     Urgent evaluation a 65-year-old male with a past medical history of diabetes, hepatitis-C, cirrhosis, seizure disorder, anxiety, presenting to the emergency department for evaluation of possible seizure.  Patient is afebrile, nontoxic appearing, hemodynamically stable.  Physical exam reveals diffuse tenderness palpation of the cervical and thoracic spine with no point tenderness.  No focal neurological deficits or weaknesses.  I did review patient's medical record in epic.  He presented to the ED on 08/27/2020 for similar complaints.  He was ultimately discharged the morning of 08/28/2020 after receiving extensive imaging.  Patient had a negative CT of the cervical spine however x-ray showed degenerative changes.  Him to obtain MRI of the cervical spine however patient was non compliant with instructions.  He refused to keep the  cervical collar on.  He continually sleepy demanded Xanax and opiates.  He ultimately was discharged home with a prescription for Keppra.  Per ED staff, the patient left his paperwork at the .  Will obtain repeat labs, continue to monitor.    Independently Interpreted Test(s):   I have ordered and independently interpreted EKG Reading(s) - see prior notes  Clinical Tests:   Lab Tests: Ordered and Reviewed  Medical Tests: Ordered and Reviewed  ED Management:    Upon further discussion with the patient and my supervising physician, the patient admits that he took some xanax her purchased off the street yesterday. He also states that a friend of his has been giving him norco 10mg. He states that he remembers taking the xanax yesterday and watching TV. He denies any new numbness, tingling, weakness of his upper or lower extremities.    EKG shows sinus bradycardia with a rate of 56bpm, no STEMI. Accucheck is 91. CBC shows no leukocytosis, H&H is 12.7/38.3.  UA is unremarkable.  CPK is minimally elevated at 269, chemistry shows normal BUN creatinine, no other significant findings.    Patient contacted nursing staff stating he felt like he may have a seizure.  He was observed by myself and other nursing staff, no seizure-like activity.  He is given a dose of Keppra in the ED.  He is also given a dose of Xanax medius he states he is out.    I am concerned that the patient's current complaints are secondary to polypharmacy as well as noncompliance with his keppra. Patient does not have any midline tenderness of the cervical spine, had a normal CT cervical spine yesterday. I do not feel that repeat imaging is indicated at this point. Patient received monthly prescriptions from regular providers for his xanax and suboxone. He had 30 days worth filled on 8/7/2020, he is out early. Given the fact that he admits he buys and takes additional street drugs, I do not feel comfortable writing him any prescriptions for  controlled substances.     Patient will be discharged home.  He was given a prescription for Keppra as he did not take his prior prescription home.  He requested this be sent into his pharmacy.  This was done.  He is stable for discharge home.  Discussed the importance of avoiding the use of street drugs and taking his prescribed medication as indicated.  Discharged in stable condition.The patient was instructed to follow up with a primary care provider in 2 days or to return to the emergency department for worsening symptoms. The treatment plan was discussed with the patient who demonstrated understanding and comfort with plan. The patient's history, physical exam, and plan of care was discussed with and agreed upon with my supervising physician.         This note was created using [employer] Fluency Direct. There may be typographical errors secondary to dictation.                 Attending Attestation:     Physician Attestation Statement for NP/PA:   I have conducted a face to face encounter with this patient in addition to the NP/PA, due to Medical Complexity    Other NP/PA Attestation Additions:    History of Present Illness: 65 M hx above here for head and neck pain in the setting of AMS- was found sleeping on the floor by his son this am, seen in the ED yesterday for neck pain with frequent request for Xanax and pain medication.  Imaging at that time was negative, patient was placed in a soft collar which she continued to remove during his ED stay.  MRI was ordered due to his continued pain despite negative CT but study was suboptimal.  He is here today for continued sx and refill of xanax.  Currently denies change in vision, difficulty speaking, numbness/tingling, weakness in extremities.  No difficulty ambulating.     Physical Exam: General: middle aged male, appears older than stated age.  No acute distress  HEENT:  Normocephalic, no evidence of large hematoma or scalp tenderness.  No scleral icterus, no  facial tenderness or evidence of trauma.  Oropharynx is clear  Neck:  No midline tenderness, step-off or deformity.  Patient is laying in the stretcher with his head lifted off the bed, is moving it freely during conversation.  There is mild cervical paraspinal tenderness.    Neuro:  Strength 5/5 in upper lower extremities, no sensory deficit.  Cranial nerves II-XII intact.     Medical Decision Making: On further discussion with patient, he discusses buying Xanax from off the street and getting Norco from his friend.  We have discussed that this is not recommended take a controlled substances that are not prescribed to him and this likely is contributing to his inability to remember daily events.    Patient has follow-up appointment with PCP on Monday for refills.  Will discharge patient with Keppra.  Follow-up with PCP for further medications                               Clinical Impression:     1. Neck pain    2. History of seizure             ED Disposition Condition    Discharge Stable        ED Prescriptions     Medication Sig Dispense Start Date End Date Auth. Provider    levETIRAcetam (KEPPRA) 500 MG Tab  (Status: Discontinued) Take 1 tablet (500 mg total) by mouth 2 (two) times daily. 60 tablet 8/29/2020 8/29/2020 Nadine Vasquez PA-C    levETIRAcetam (KEPPRA) 500 MG Tab Take 1 tablet (500 mg total) by mouth 2 (two) times daily. 60 tablet 8/29/2020 9/28/2020 Nadine Vasquez PA-C        Follow-up Information     Follow up With Specialties Details Why Contact Info    Tierra Max MD Internal Medicine   [address] [facility] [address]  [facility] [address]  [phone]      [facility] Emergency Medicine   [address] [place] [id number] [phone]                                     Nadine Vasquez PA-C  08/29/20 1210       Tomasa Gage MD  08/29/20 1325

## 2020-08-29 NOTE — ED NOTES
"Pt called out to RN stating "I think I am about to have another seizure". RN and FRANCISCA George at the bedside.   "

## 2020-08-30 NOTE — ED PROVIDER NOTES
"Encounter Date: 8/29/2020       History     Chief Complaint   Patient presents with    Re-evaluation     Returned to ER beacuse his pharmacy was closed and he couldn't  prescribed Keppra.      Mr Richter is a 65yoM who presents for prescription print out, just discharged from ED with Keppra Rx, though was unable to  at pharmacy where it was called in, d/t pharmacy being closed. Patient also requesting additional Xanax, though he received one at prior visit 4 hours ago. Denies seizure activity since discharge (received keppra in ED).  The patients available PMH, PSH, Social History, medications, allergies, and triage vital signs were reviewed just prior to their medical evaluation.    Please be advised this text was dictated with [employer] software and may contain errors due to translation.           Review of patient's allergies indicates:   Allergen Reactions    Eggs [egg derived] Anaphylaxis    Mayonnaise Anaphylaxis    Milk containing products Anaphylaxis    Mustard Anaphylaxis     Past Medical History:   Diagnosis Date    Anxiety     Cirrhosis     Diabetes mellitus 2017    Diabetes mellitus, type 2     HCV (hepatitis C virus)     Heart damage     "hole in heart"    Kidney stone     s/p L ureteral stent placement on 5/29/2018. Infection cleared and he subsequently had a stent removal    Seizures     Urinary tract infection      Past Surgical History:   Procedure Laterality Date    INTRACARDIAC ECHOCARDIOGRAPHY N/A 10/17/2019    Procedure: Intracardiac echocardiogram;  Surgeon: William Edmonds MD;  Location: [facility] CATH LAB;  Service: Cardiology;  Laterality: N/A;    KIDNEY STONE SURGERY      LEFT HEART CATHETERIZATION N/A 10/17/2019    Procedure: Left heart cath;  Surgeon: William Edmonds MD;  Location: [facility] CATH LAB;  Service: Cardiology;  Laterality: N/A;    NECK SURGERY      secondary to previous fall     Family History   Problem Relation Age of Onset    Hypertension Mother     " "Diabetes Mother     Stroke Mother     Coronary artery disease Father         CABG    Prostate cancer Neg Hx     Kidney disease Neg Hx     Colon cancer Neg Hx      Social History     Tobacco Use    Smoking status: Current Every Day Smoker     Packs/day: 0.50     Years: 40.00     Pack years: 20.00     Types: Cigarettes     Start date:      Last attempt to quit: 2019     Years since quittin.5    Smokeless tobacco: Never Used    Tobacco comment: states "very few"    Substance Use Topics    Alcohol use: No    Drug use: Not Currently     Types: Oxycodone     Review of Systems   Respiratory: Negative for shortness of breath.    Cardiovascular: Negative for chest pain.   Gastrointestinal: Negative for abdominal pain.   Neurological: Negative for seizures.       Physical Exam     Initial Vitals [20 1620]   BP Pulse Resp Temp SpO2   136/78 70 16 97.7 °F (36.5 °C) 99 %      MAP       --         Physical Exam    Nursing note and vitals reviewed.  Constitutional: He appears well-developed. He is not diaphoretic. No distress.   Disheveled appearing   Eyes: Conjunctivae and EOM are normal. No scleral icterus.   Pulmonary/Chest: No respiratory distress.   Neurological: He is alert and oriented to person, place, and time.         ED Course   Procedures  Labs Reviewed - No data to display       Imaging Results    None          Medical Decision Making:   History:   Old Medical Records: I decided to obtain old medical records.  Old Records Summarized: records from clinic visits and records from previous admission(s).  Initial Assessment:   Patient returns for paper prescription, as his keppra was called into a pharmacy that is closed (seen earlier today in ED).   Differential Diagnosis:   Physical exam and history taking lower clinical suspicion for respiratory compromise, status epilepticus.   ED Management:  Given hard copy keppra Rx. Will not given additional ativan at this time as requested. F/u with " psych on Monday. Discharged in stable condition with strict ED return precautions. Patient agreed to plan of care and voiced understanding.    Constance Thompson PA-C  08/30/2020                                     Clinical Impression:       ICD-10-CM ICD-9-CM   1. Prescription refill  Z76.0 V68.1         Disposition:   Disposition: Discharged  Condition: Stable     ED Disposition Condition    Discharge Stable        ED Prescriptions     Medication Sig Dispense Start Date End Date Auth. Provider    levETIRAcetam (KEPPRA) 500 MG Tab Take 1 tablet (500 mg total) by mouth 2 (two) times daily. 60 tablet 8/29/2020 9/28/2020 Constance Thompson PA-C        Follow-up Information     Follow up With Specialties Details Why Contact Info    [facility] Emergency Medicine Go to  Return to the ER immediately, If symptoms worsen or new symptoms occur [address] [facility] [id number] [phone]                                     Constance Thompson PA-C  08/30/20 1112

## 2020-09-19 ENCOUNTER — HOSPITAL ENCOUNTER (EMERGENCY)
Facility: HOSPITAL | Age: 65
Discharge: HOME | End: 2020-09-19
Attending: EMERGENCY MEDICINE
Payer: MEDICARE

## 2020-09-19 VITALS
SYSTOLIC BLOOD PRESSURE: 142 MMHG | OXYGEN SATURATION: 98 % | WEIGHT: 160.06 LBS | DIASTOLIC BLOOD PRESSURE: 78 MMHG | BODY MASS INDEX: 25.72 KG/M2 | RESPIRATION RATE: 18 BRPM | TEMPERATURE: 98 F | HEART RATE: 89 BPM | HEIGHT: 66 IN

## 2020-09-19 DIAGNOSIS — S90.32XA CONTUSION OF LEFT FOOT, INITIAL ENCOUNTER: ICD-10-CM

## 2020-09-19 DIAGNOSIS — S09.90XA INJURY OF HEAD, INITIAL ENCOUNTER: Primary | ICD-10-CM

## 2020-09-19 PROCEDURE — 99284 EMERGENCY DEPT VISIT MOD MDM: CPT | Mod: 25

## 2020-09-19 PROCEDURE — 99284 EMERGENCY DEPT VISIT MOD MDM: CPT | Mod: ,,, | Performed by: EMERGENCY MEDICINE

## 2020-09-19 PROCEDURE — 25000003 PHARM REV CODE 250: Performed by: EMERGENCY MEDICINE

## 2020-09-19 PROCEDURE — 99284 PR EMERGENCY DEPT VISIT,LEVEL IV: ICD-10-PCS | Mod: ,,, | Performed by: EMERGENCY MEDICINE

## 2020-09-19 RX ORDER — ALPRAZOLAM 0.5 MG/1
0.5 TABLET ORAL
Status: COMPLETED | OUTPATIENT
Start: 2020-09-19 | End: 2020-09-19

## 2020-09-19 RX ORDER — ACETAMINOPHEN 500 MG
1000 TABLET ORAL
Status: COMPLETED | OUTPATIENT
Start: 2020-09-19 | End: 2020-09-19

## 2020-09-19 RX ADMIN — ACETAMINOPHEN 1000 MG: 500 TABLET ORAL at 04:09

## 2020-09-19 RX ADMIN — ALPRAZOLAM 0.5 MG: 0.5 TABLET ORAL at 05:09

## 2020-09-19 NOTE — ED TRIAGE NOTES
Patient comes into ER by EMS with complaints of left foot pain since yesterday post fall. Patient states that he doesn't remember the fall or if he hit his head/LOC. Patient denies head, neck, or pain.

## 2020-09-19 NOTE — ED PROVIDER NOTES
"Source of History:  Patient    Chief complaint:  Fall (fell yesterday, does not remember specifics of fall, has hematoma on back of head, complaining of head, and neck pain, left ankle pain, mild swelling, bruising )      HPI:  Josiah Richter is a 65 y.o. male presenting with headache and left foot pain after a fall yesterday.  "I fell down the stairs."  He does not remember if there was loss of consciousness.  He admits to some alcohol use today.  He states he was drinking the alcohol because of the pain.  No other complaints.  Denies neck pain to me despite nursing notes of neck pain.  Ambulates with cane in the ED.      ROS: As per HPI and below:  General: No fever.  No chills.  Head: Notes headache.  Unsure of loss of consciousness.  Neck: No neck pain.  Back: No back pain.  Extremities: Left foot pain.  Chest: No shortness of breath.  No chest pain.  Cardiovascular: No palpitations.  Abdomen: No abdominal pain.  No nausea or vomiting.  Integument: No rashes or bruising.  Eyes: No visual changes.  Urinary: No hematuria.  Neurologic: No numbness.  No focal weakness.      Review of patient's allergies indicates:   Allergen Reactions    Eggs [egg derived] Anaphylaxis    [facility] Anaphylaxis    Milk containing products Anaphylaxis    Mustard Anaphylaxis       No current facility-administered medications on file prior to encounter.      Current Outpatient Medications on File Prior to Encounter   Medication Sig Dispense Refill    ALPRAZolam (XANAX) 2 MG Tab Take 2 mg by mouth.      atorvastatin (LIPITOR) 20 MG tablet Take 20 mg by mouth.      clopidogrel (PLAVIX) 75 mg tablet Take 1 tablet (75 mg total) by mouth once daily. 14 tablet 0    gabapentin (NEURONTIN) 300 MG capsule TK 1 C PO TID PRN 90 capsule 2    HYDROcodone-acetaminophen (NORCO) 7.5-325 mg per tablet Take 1 tablet by mouth every 6 (six) hours as needed for Pain. (Patient not taking: Reported on 4/17/2020) 10 tablet 0    ibuprofen " "(ADVIL,MOTRIN) 800 MG tablet Take 1 tablet (800 mg total) by mouth every 6 (six) hours as needed for Pain. 20 tablet 0    ipratropium (ATROVENT) 42 mcg (0.06 %) nasal spray 2 sprays by Nasal route 4 (four) times daily. (Patient not taking: Reported on 10/14/2019) 15 mL 3    ketorolac (TORADOL) 10 mg tablet Take 1 tablet (10 mg total) by mouth every 6 (six) hours. (Patient not taking: Reported on 4/17/2020) 15 tablet 0    levETIRAcetam (KEPPRA) 500 MG Tab Take 1 tablet (500 mg total) by mouth 2 (two) times daily. 60 tablet 0    ondansetron (ZOFRAN) 4 MG tablet Take 1 tablet (4 mg total) by mouth every 6 (six) hours. (Patient not taking: Reported on 4/17/2020) 12 tablet 0    SUBOXONE 8-2 mg Film DISSOLVE 1 FILM UNT TID  0    VENTOLIN HFA 90 mcg/actuation inhaler inhale ONE PUFF EVERY 4 TO 6 HOURS IF NEED FOR cough 18 g 0       PMH:  As per HPI and below:  Past Medical History:   Diagnosis Date    Anxiety     Cirrhosis     Diabetes mellitus 2017    Diabetes mellitus, type 2     HCV (hepatitis C virus)     Heart damage     "hole in heart"    Kidney stone     s/p L ureteral stent placement on 5/29/2018. Infection cleared and he subsequently had a stent removal    Seizures     Urinary tract infection      Past Surgical History:   Procedure Laterality Date    INTRACARDIAC ECHOCARDIOGRAPHY N/A 10/17/2019    Procedure: Intracardiac echocardiogram;  Surgeon: William Edmonds MD;  Location: [facility] CATH LAB;  Service: Cardiology;  Laterality: N/A;    KIDNEY STONE SURGERY      LEFT HEART CATHETERIZATION N/A 10/17/2019    Procedure: Left heart cath;  Surgeon: William Edmonds MD;  Location: [facility] CATH LAB;  Service: Cardiology;  Laterality: N/A;    NECK SURGERY      secondary to previous fall       Social History     Socioeconomic History    Marital status:      Spouse name: Not on file    Number of children: Not on file    Years of education: Not on file    Highest education level: Not on file " "  Occupational History    Not on file   Social Needs    Financial resource strain: Not on file    Food insecurity     Worry: Not on file     Inability: Not on file    Transportation needs     Medical: Not on file     Non-medical: Not on file   Tobacco Use    Smoking status: Current Every Day Smoker     Packs/day: 0.50     Years: 40.00     Pack years: 20.00     Types: Cigarettes     Start date:      Last attempt to quit: 2019     Years since quittin.5    Smokeless tobacco: Never Used    Tobacco comment: states "very few"    Substance and Sexual Activity    Alcohol use: No    Drug use: Not Currently     Types: Oxycodone    Sexual activity: Not Currently   Lifestyle    Physical activity     Days per week: Not on file     Minutes per session: Not on file    Stress: Not on file   Relationships    Social connections     Talks on phone: Not on file     Gets together: Not on file     Attends Yazidi service: Not on file     Active member of club or organization: Not on file     Attends meetings of clubs or organizations: Not on file     Relationship status: Not on file   Other Topics Concern    Not on file   Social History Narrative    Not on file       Family History   Problem Relation Age of Onset    Hypertension Mother     Diabetes Mother     Stroke Mother     Coronary artery disease Father         CABG    Prostate cancer Neg Hx     Kidney disease Neg Hx     Colon cancer Neg Hx        Physical Exam:    Vitals:    20 1452 20 1453   BP: (!) 142/78    Pulse: 89    Resp: 18    Temp:  98.4 °F (36.9 °C)   TempSrc:  Oral   SpO2: 98%    Weight: 72.6 kg (160 lb 0.9 oz)    Height: 5' 6" (1.676 m)      Appearance: No acute distress.  Head: Small occipital hematoma.  No ecchymoses on skull.    Neck: No cervical spine tenderness.  Full range of motion.  No soft tissue tenderness.  Back: No thoracic, lumbar or sacral spine tenderness.  No soft tissue tenderness.  Chest: No chest wall " tenderness.  Breath sounds are equal bilaterally.  No wheezes.  No rhonchi.  No rales.  Cardiovascular: Regular rate and rhythm.  No murmurs.  No gallops.  No rubs.  Abdomen: Soft. Nontender.   No distention.  No guarding. No rebound.  No ecchymoses.  Integument: No ecchymoses or other signs of trauma.  Musculoskeletal: Good range of motion of all joints.  Slight redness, tenderness and swelling of the dorsal left midfoot.    Neurologic: Motor intact.  Sensation intact.  Cranial nerves II through XII intact.    Mental status: Alert and oriented x 3.  GCS 15.  Slightly clinically intoxicated.      Initial Impression:  Fall yesterday with foot pain and head trauma.  He seems a little bit intoxicated.  Will do x-rays of his foot and ankle and CT has had.  Acetaminophen for pain.  Med list includes Suboxone and Xanax.    Laboratory Studies:  Labs Reviewed - No data to display    I decided to obtain the patient's medical records.    Imaging Results          CT Head Without Contrast (Final result)  Result time 09/19/20 17:42:22    Final result by Sam Mccarthy MD (09/19/20 17:42:22)                 Impression:      No acute intracranial abnormality.  Additional evaluation, as clinically warranted.    Mild soft tissue induration overlying the right occipital bone.  No associated fracture.    Electronically signed by resident: Kam Sosa  Date:    09/19/2020  Time:    17:24    Electronically signed by: Sam Mccarthy MD  Date:    09/19/2020  Time:    17:42             Narrative:    EXAMINATION:  CT HEAD WITHOUT CONTRAST    CLINICAL HISTORY:  head trauma;    TECHNIQUE:  Low dose axial CT images obtained throughout the head without intravenous contrast. Sagittal and coronal reconstructions were performed.    COMPARISON:  CT 08/28/2020, 06/07/2019    FINDINGS:  Intracranial compartment:    The ventricles are stable in size without hydrocephalus.  No extra-axial blood or fluid collections.    The brain parenchyma appears  unchanged.  No parenchymal mass, hemorrhage, edema or major vascular distribution infarct.  No midline shift.    Skull/extracranial contents (limited evaluation): Small amount of soft tissue induration overlying the right aspect of the occipital bone.  No displaced fracture.  Small defect or area of cortical thinning involving the anterior aspect of the left frontal sinus, similar to multiple prior studies.  Mastoid air cells and paranasal sinuses are essentially clear.  Orbits and intraorbital contents demonstrate no significant abnormality.                               X-Ray Ankle Complete Left (Final result)  Result time 09/19/20 16:54:10    Final result by Rio De La Rosa MD (09/19/20 16:54:10)                 Impression:      1. Linear lucency projected in the region of the distal fibula, suggests interface of the fibula with the medial aspect of the tibia rather than fracture however positioning is suboptimal.  Correlation with any focal tenderness is recommended noting no significant edema overlies the region.  Correlation is advised.      Electronically signed by: Rio De La Rosa MD  Date:    09/19/2020  Time:    16:54             Narrative:    EXAMINATION:  XR ANKLE COMPLETE 3 VIEW LEFT    CLINICAL HISTORY:  fall;    TECHNIQUE:  AP, lateral and oblique views of the left ankle were performed.    COMPARISON:  None    FINDINGS:  Three views left ankle.    There is suboptimal positioning.  No significant edema about the ankle.  There is lucency involving the distal aspect of the fibula, suggesting projectional change from the medial aspect of the tibia however correlation with any focal tenderness is recommended noting no overlying edema.  No radiopaque foreign body.  There is mild edema about the dorsal aspect of the foot.  Degenerative changes are noted of the calcaneus.                               X-Ray Foot Complete Left (Final result)  Result time 09/19/20 16:54:15    Final result by Ariella HUTCHINS  MD Osbaldo (09/19/20 16:54:15)                 Impression:      As above.      Electronically signed by: Ariella Roblero MD  Date:    09/19/2020  Time:    16:54             Narrative:    EXAMINATION:  XR FOOT COMPLETE 3 VIEW LEFT    CLINICAL HISTORY:  fall;.    TECHNIQUE:  AP, lateral and oblique views of the left foot were performed.    COMPARISON:  None    FINDINGS:  No acute fracture or bony destructive process is seen.  Alignment is preserved.  There are calcaneal spurs.                                Medications Given:  Medications   ALPRAZolam tablet 0.5 mg (has no administration in time range)   acetaminophen tablet 1,000 mg (1,000 mg Oral Given 9/19/20 1613)       ED Course:  ED Course as of Sep 19 1753   Sat Sep 19, 2020   1721 No fracture seen per my independent interpretation.       X-Ray Foot Complete Left [DC]   1730 No fracture seen per my independent interpretation.       X-Ray Ankle Complete Left [DC]      ED Course User Index  [DC] Julio Verdugo MD              MDM:    65 y.o. male with contusions of the foot and head after a fall yesterday.  Unclear LOC and possible ETOH on board so did CT, which was negative per Radiology.  X-ray showed no fractures.  Gave the patient a small Xanax as he is on 2 mg at home and felt he was starting to withdraw a bit.  He plans to take a dose at home when he returns.  OTC pain med of choice at home.    Diagnostic Impression:    1. Injury of head, initial encounter    2. Contusion of left foot, initial encounter         ED Disposition Condition    Discharge Stable        ED Prescriptions     None        Follow-up Information     Follow up With Specialties Details Why Contact Info    Tierra Max MD Internal Medicine In 1 week  [address]  [address]  [place] [address]  [phone]                           Julio Verdugo MD  09/19/20 4411

## 2020-09-19 NOTE — DISCHARGE INSTRUCTIONS
Over-the-counter pain medicine of choice  Apply ice to painful areas several times daily for 10-15 minutes per application

## 2022-01-05 ENCOUNTER — PATIENT OUTREACH (OUTPATIENT)
Dept: ADMINISTRATIVE | Facility: HOSPITAL | Age: 67
End: 2022-01-05
Payer: MEDICARE

## 2022-01-20 ENCOUNTER — TELEPHONE (OUTPATIENT)
Dept: INTERNAL MEDICINE | Facility: CLINIC | Age: 67
End: 2022-01-20
Payer: MEDICARE

## 2022-01-20 NOTE — LETTER
January 20, 2022    Josiah Gregory Neelam  [address]  [address]  [place] [address]             [facility]  [address]  [facility] [address]  Phone: [phone]  Fax: [phone] Rahul Rahman!      We are happy to announce our clinics are open and ready to care for you!  [facility] is committed to supporting your overall health and would like to send a friendly reminder that you may be currently overdue for your annual.     The best time to schedule your visit with Dr.Julie GERI Max is right after reading this message. It will only take two minutes -- click the Appointments button within [facility] or [email or web address], or call our office at [phone].     Currently, we have Dr.Julie GERI Max listed as your primary care provider. If this is incorrect, please let us know by emailing or contacting our office at [phone] so we can update our records and notify your insurance company.     We look forward to seeing you soon and appreciate the opportunity to serve you.     Sincerely,     Your [facility] Team        Tierra Max MD

## 2022-10-31 NOTE — TELEPHONE ENCOUNTER
----- Message from Hector Lloyd MA sent at 3/19/2020 11:02 AM CDT -----  Contact: Self      ----- Message -----  From: Madina aGrcia  Sent: 3/19/2020  10:49 AM CDT  To: Mansoor Mayorga Staff    Pt is calling to speak with Staff because he was recently diagnosed with Covid-19 and having problems.  He says he has a fever of 102 and was advised to call ER if his temperature goes up one degree.  Pt is requesting medication for his cough & was given two months worth prescriptions.  He is requesting a returned call, as soon as possible.  Pt says he health is not in the best condition & needs help.    He can be reached at [phone].    Thank you.   Yes

## 2023-08-08 NOTE — ED NOTES
----- Message from Roz Veliz sent at 8/8/2023 11:07 AM CDT -----  Regarding: AUDIO REFERRAL  This patient is scheduled for an audiogram on 8/23/23. Per Medicare/Medicaid a referral is needed to audiology to ensure insurance coverage.  Please submit       Thank You Pt. Denies questions or concerns regarding discharge instructions or fu. No acute distress noted. Ambulatory with steady gait to lobby.

## (undated) DEVICE — KIT CUSTOM MANIFOLD

## (undated) DEVICE — PROTECTION STATION PLUS

## (undated) DEVICE — GUIDEWIRE EMERALD 150CM PTFE

## (undated) DEVICE — OMNIPAQUE 350 200ML

## (undated) DEVICE — CATH DXTERITY MPASHA 110CM 6FR

## (undated) DEVICE — SHEATH INTRODUCER 6FR 11CM

## (undated) DEVICE — SPIKE CONTRAST CONTROLLER

## (undated) DEVICE — SHEATH AGA TREVISIO 9FR 80CM

## (undated) DEVICE — BLLN SIZING AGA 24MM

## (undated) DEVICE — SHEATH PINNACLE INTRO 11FR

## (undated) DEVICE — CATH INFINITI JUDKINS JR4

## (undated) DEVICE — GUIDEWIRE SUPRA CORE 035 190CM

## (undated) DEVICE — CATH ACUSON ACUNAV 8FR

## (undated) DEVICE — GUIDEWIRE AMPLATZ .035X260

## (undated) DEVICE — SEE MEDLINE ITEM 156894

## (undated) DEVICE — INTRODUCER BRITE TIP 8F 35CM

## (undated) DEVICE — CATH JL5 4FR INFINITY

## (undated) DEVICE — COVER DRAPE ACUSON STERILE

## (undated) DEVICE — KIT MICROINTRO 4F .018X40X7CM

## (undated) DEVICE — SHEATH BRITE TIP 9F 35CM

## (undated) DEVICE — DEVICE PERCLOSE SUT CLSR 6FR